# Patient Record
Sex: FEMALE | Race: WHITE | NOT HISPANIC OR LATINO | Employment: FULL TIME | URBAN - NONMETROPOLITAN AREA
[De-identification: names, ages, dates, MRNs, and addresses within clinical notes are randomized per-mention and may not be internally consistent; named-entity substitution may affect disease eponyms.]

---

## 2017-02-22 ENCOUNTER — OFFICE VISIT (OUTPATIENT)
Dept: OBSTETRICS AND GYNECOLOGY | Facility: CLINIC | Age: 57
End: 2017-02-22

## 2017-02-22 VITALS
WEIGHT: 141 LBS | SYSTOLIC BLOOD PRESSURE: 122 MMHG | BODY MASS INDEX: 22.66 KG/M2 | HEIGHT: 66 IN | DIASTOLIC BLOOD PRESSURE: 62 MMHG

## 2017-02-22 DIAGNOSIS — F17.200 SMOKER: ICD-10-CM

## 2017-02-22 DIAGNOSIS — N95.1 MENOPAUSAL SYMPTOMS: ICD-10-CM

## 2017-02-22 DIAGNOSIS — N87.9 DYSPLASIA OF CERVIX: Primary | ICD-10-CM

## 2017-02-22 PROCEDURE — 87624 HPV HI-RISK TYP POOLED RSLT: CPT | Performed by: NURSE PRACTITIONER

## 2017-02-22 PROCEDURE — 99213 OFFICE O/P EST LOW 20 MIN: CPT | Performed by: NURSE PRACTITIONER

## 2017-02-22 PROCEDURE — 87625 HPV TYPES 16 & 18 ONLY: CPT | Performed by: NURSE PRACTITIONER

## 2017-02-22 PROCEDURE — G0123 SCREEN CERV/VAG THIN LAYER: HCPCS | Performed by: NURSE PRACTITIONER

## 2017-02-22 NOTE — PROGRESS NOTES
Subjective   Elizabeth Carrillo is a 57 y.o. female.     HPI Comments: Here for Repeat pap. She feels she may have a yeast infection.      The following portions of the patient's history were reviewed and updated as appropriate: allergies, current medications, past family history, past medical history, past social history, past surgical history and problem list.    Review of Systems   Constitutional: Negative for activity change, appetite change, chills, diaphoresis, fatigue, fever and unexpected weight change.   HENT: Negative for congestion, ear discharge, ear pain, facial swelling, hearing loss, mouth sores, nosebleeds, postnasal drip, rhinorrhea, sinus pressure, sneezing, sore throat, tinnitus, trouble swallowing and voice change.    Eyes: Negative for photophobia, pain, discharge, redness, itching and visual disturbance.   Respiratory: Negative for apnea, cough, choking, chest tightness and shortness of breath.    Cardiovascular: Negative for chest pain, palpitations and leg swelling.   Gastrointestinal: Negative for abdominal distention, abdominal pain, anal bleeding, blood in stool, constipation, diarrhea, nausea, rectal pain and vomiting.   Endocrine: Negative for cold intolerance and heat intolerance.   Genitourinary: Negative for decreased urine volume, difficulty urinating, dyspareunia, flank pain, frequency, genital sores, hematuria, menstrual problem, pelvic pain, urgency, vaginal bleeding, vaginal discharge and vaginal pain.   Musculoskeletal: Negative for arthralgias, back pain, joint swelling and myalgias.   Skin: Negative for color change and rash.   Allergic/Immunologic: Negative for environmental allergies.   Neurological: Negative for dizziness, syncope, weakness, numbness and headaches.   Hematological: Negative for adenopathy.   Psychiatric/Behavioral: Negative for agitation, confusion and sleep disturbance. The patient is not nervous/anxious.        Objective   Physical Exam   Constitutional: She  is oriented to person, place, and time. She appears well-developed and well-nourished.   HENT:   Head: Normocephalic and atraumatic.   Abdominal: Soft. She exhibits no distension. There is no tenderness.   Genitourinary: Vagina normal and uterus normal.       There is no rash, tenderness, lesion or injury on the right labia. There is no rash, tenderness, lesion or injury on the left labia. Uterus is not enlarged and not tender. Cervix exhibits no motion tenderness, no discharge and no friability. Right adnexum displays no mass, no tenderness and no fullness. Left adnexum displays no mass, no tenderness and no fullness. No erythema, tenderness or bleeding in the vagina. No vaginal discharge found.   Genitourinary Comments: Vaginal dryness noted.   Neurological: She is alert and oriented to person, place, and time.   Skin: Skin is warm and dry.   Psychiatric: She has a normal mood and affect. Her behavior is normal. Judgment and thought content normal.   Nursing note and vitals reviewed.      Assessment/Plan   Elizabeth was seen today for abnormal pap smear.    Diagnoses and all orders for this visit:    Dysplasia of cervix  Comments:  Repeat pap is done today.    Menopausal symptoms  Comments:  Pt has been on OC's even though she is a smoker. I explained to her she cannot take them and the reasons why. Will try low dose Prempro.  Orders:  -     estrogen, conjugated,-medroxyprogesterone (PREMPRO) 0.45-1.5 MG per tablet; Take 1 tablet by mouth Daily for 30 days.    Smoker  Comments:  Discussed Smoking cessation.

## 2017-03-13 ENCOUNTER — TELEPHONE (OUTPATIENT)
Dept: OBSTETRICS AND GYNECOLOGY | Facility: CLINIC | Age: 57
End: 2017-03-13

## 2017-03-13 LAB
GEN CATEG CVX/VAG CYTO-IMP: ABNORMAL
LAB AP CASE REPORT: ABNORMAL
Lab: ABNORMAL
PATH INTERP SPEC-IMP: ABNORMAL
STAT OF ADQ CVX/VAG CYTO-IMP: ABNORMAL

## 2017-03-31 ENCOUNTER — OFFICE VISIT (OUTPATIENT)
Dept: OBSTETRICS AND GYNECOLOGY | Facility: CLINIC | Age: 57
End: 2017-03-31

## 2017-03-31 VITALS
BODY MASS INDEX: 22.82 KG/M2 | SYSTOLIC BLOOD PRESSURE: 110 MMHG | HEIGHT: 66 IN | WEIGHT: 142 LBS | DIASTOLIC BLOOD PRESSURE: 80 MMHG

## 2017-03-31 DIAGNOSIS — R87.610 ASCUS WITH POSITIVE HIGH RISK HPV CERVICAL: Primary | ICD-10-CM

## 2017-03-31 DIAGNOSIS — R87.810 ASCUS WITH POSITIVE HIGH RISK HPV CERVICAL: Primary | ICD-10-CM

## 2017-03-31 DIAGNOSIS — F17.200 SMOKER: ICD-10-CM

## 2017-03-31 PROCEDURE — 57455 BIOPSY OF CERVIX W/SCOPE: CPT | Performed by: OBSTETRICS & GYNECOLOGY

## 2017-03-31 PROCEDURE — 88305 TISSUE EXAM BY PATHOLOGIST: CPT | Performed by: OBSTETRICS & GYNECOLOGY

## 2017-03-31 NOTE — PROGRESS NOTES
Colposcopy    Date of procedure:  3/31/2017    Risks and benefits discussed? yes  All questions answered? yes  Consents given by the patient  Written consent obtained? yes    Local anesthesia used:  no    Pre-op indication: ASCUS with POSITIVE high risk HPV  Procedure documentation:    The cervix was initially viewed colposcopically through a green filter.  The cervix was next bathed in acetic acid.   The findings were as follows:      The transformation zone was able to be seen adequately.    No visible lesions    Ectocervical biopsies were taken from  12 o'clock hemostasis achieved with  silver nitrate    An ECC was not performed.      Colposcopic Impression: 1. Adequate colposcopy  2. Normal cervix  3. Colposcopic findings are consistent with PAP       Plan: Will base further treatment on pathology results  Post biopsy instructions given to patient.  Specimens labelled and sent to pathology.      This note was electronically signed.    Jose M Chatman MD  March 31, 2017

## 2017-04-04 LAB
CYTO UR: NORMAL
LAB AP CASE REPORT: NORMAL
LAB AP CLINICAL INFORMATION: NORMAL
Lab: NORMAL
PATH REPORT.FINAL DX SPEC: NORMAL
PATH REPORT.GROSS SPEC: NORMAL

## 2018-12-28 ENCOUNTER — HOSPITAL ENCOUNTER (EMERGENCY)
Facility: HOSPITAL | Age: 58
Discharge: HOME | End: 2018-12-28
Admitting: EMERGENCY MEDICINE

## 2018-12-28 ENCOUNTER — APPOINTMENT (OUTPATIENT)
Dept: GENERAL RADIOLOGY | Facility: HOSPITAL | Age: 58
End: 2018-12-28

## 2018-12-28 VITALS
SYSTOLIC BLOOD PRESSURE: 123 MMHG | RESPIRATION RATE: 16 BRPM | DIASTOLIC BLOOD PRESSURE: 80 MMHG | BODY MASS INDEX: 23.78 KG/M2 | HEIGHT: 66 IN | HEART RATE: 77 BPM | WEIGHT: 148 LBS | OXYGEN SATURATION: 99 % | TEMPERATURE: 98 F

## 2018-12-28 DIAGNOSIS — IMO0001 LUNG NODULE < 6CM ON CT: ICD-10-CM

## 2018-12-28 DIAGNOSIS — N39.0 ACUTE UTI: ICD-10-CM

## 2018-12-28 DIAGNOSIS — R10.31 RIGHT LOWER QUADRANT ABDOMINAL PAIN: Primary | ICD-10-CM

## 2018-12-28 LAB
ALBUMIN SERPL-MCNC: 4.7 G/DL (ref 3.5–5)
ALBUMIN/GLOB SERPL: 1.4 G/DL (ref 1.1–2.5)
ALP SERPL-CCNC: 96 U/L (ref 24–120)
ALT SERPL W P-5'-P-CCNC: 35 U/L (ref 0–54)
AMYLASE SERPL-CCNC: 94 U/L (ref 30–110)
ANION GAP SERPL CALCULATED.3IONS-SCNC: 10 MMOL/L (ref 4–13)
AST SERPL-CCNC: 40 U/L (ref 7–45)
BACTERIA UR QL AUTO: ABNORMAL /HPF
BASOPHILS # BLD AUTO: 0.04 10*3/MM3 (ref 0–0.2)
BASOPHILS NFR BLD AUTO: 0.3 % (ref 0–2)
BILIRUB SERPL-MCNC: 0.3 MG/DL (ref 0.1–1)
BILIRUB UR QL STRIP: ABNORMAL
BUN BLD-MCNC: 11 MG/DL (ref 5–21)
BUN/CREAT SERPL: 16.7 (ref 7–25)
CALCIUM SPEC-SCNC: 9.7 MG/DL (ref 8.4–10.4)
CHLORIDE SERPL-SCNC: 99 MMOL/L (ref 98–110)
CLARITY UR: CLEAR
CO2 SERPL-SCNC: 31 MMOL/L (ref 24–31)
COLOR UR: ABNORMAL
CREAT BLD-MCNC: 0.66 MG/DL (ref 0.5–1.4)
CRP SERPL-MCNC: 0.71 MG/DL (ref 0–0.99)
DEPRECATED RDW RBC AUTO: 45.8 FL (ref 40–54)
EOSINOPHIL # BLD AUTO: 0.14 10*3/MM3 (ref 0–0.7)
EOSINOPHIL NFR BLD AUTO: 1.2 % (ref 0–4)
ERYTHROCYTE [DISTWIDTH] IN BLOOD BY AUTOMATED COUNT: 13.5 % (ref 12–15)
ERYTHROCYTE [SEDIMENTATION RATE] IN BLOOD: 6 MM/HR (ref 0–20)
GFR SERPL CREATININE-BSD FRML MDRD: 92 ML/MIN/1.73
GLOBULIN UR ELPH-MCNC: 3.3 GM/DL
GLUCOSE BLD-MCNC: 95 MG/DL (ref 70–100)
GLUCOSE UR STRIP-MCNC: NEGATIVE MG/DL
HCT VFR BLD AUTO: 36.3 % (ref 37–47)
HGB BLD-MCNC: 12.1 G/DL (ref 12–16)
HGB UR QL STRIP.AUTO: ABNORMAL
HYALINE CASTS UR QL AUTO: ABNORMAL /LPF
IMM GRANULOCYTES # BLD AUTO: 0.05 10*3/MM3 (ref 0–0.03)
IMM GRANULOCYTES NFR BLD AUTO: 0.4 % (ref 0–5)
KETONES UR QL STRIP: ABNORMAL
LEUKOCYTE ESTERASE UR QL STRIP.AUTO: ABNORMAL
LIPASE SERPL-CCNC: 61 U/L (ref 23–203)
LYMPHOCYTES # BLD AUTO: 2.95 10*3/MM3 (ref 0.72–4.86)
LYMPHOCYTES NFR BLD AUTO: 25.7 % (ref 15–45)
MCH RBC QN AUTO: 30.6 PG (ref 28–32)
MCHC RBC AUTO-ENTMCNC: 33.3 G/DL (ref 33–36)
MCV RBC AUTO: 91.9 FL (ref 82–98)
MONOCYTES # BLD AUTO: 0.78 10*3/MM3 (ref 0.19–1.3)
MONOCYTES NFR BLD AUTO: 6.8 % (ref 4–12)
NEUTROPHILS # BLD AUTO: 7.51 10*3/MM3 (ref 1.87–8.4)
NEUTROPHILS NFR BLD AUTO: 65.6 % (ref 39–78)
NITRITE UR QL STRIP: NEGATIVE
NRBC BLD AUTO-RTO: 0 /100 WBC (ref 0–0)
PH UR STRIP.AUTO: 5.5 [PH] (ref 5–8)
PLATELET # BLD AUTO: 409 10*3/MM3 (ref 130–400)
PMV BLD AUTO: 9.2 FL (ref 6–12)
POTASSIUM BLD-SCNC: 3.7 MMOL/L (ref 3.5–5.3)
PROT SERPL-MCNC: 8 G/DL (ref 6.3–8.7)
PROT UR QL STRIP: NEGATIVE
RBC # BLD AUTO: 3.95 10*6/MM3 (ref 4.2–5.4)
RBC # UR: ABNORMAL /HPF
REF LAB TEST METHOD: ABNORMAL
SODIUM BLD-SCNC: 140 MMOL/L (ref 135–145)
SP GR UR STRIP: 1.03 (ref 1–1.03)
SQUAMOUS #/AREA URNS HPF: ABNORMAL /HPF
UROBILINOGEN UR QL STRIP: ABNORMAL
WBC NRBC COR # BLD: 11.47 10*3/MM3 (ref 4.8–10.8)
WBC UR QL AUTO: ABNORMAL /HPF

## 2018-12-28 PROCEDURE — 93005 ELECTROCARDIOGRAM TRACING: CPT | Performed by: PHYSICIAN ASSISTANT

## 2018-12-28 PROCEDURE — 85651 RBC SED RATE NONAUTOMATED: CPT | Performed by: PHYSICIAN ASSISTANT

## 2018-12-28 PROCEDURE — 71045 X-RAY EXAM CHEST 1 VIEW: CPT

## 2018-12-28 PROCEDURE — 85025 COMPLETE CBC W/AUTO DIFF WBC: CPT | Performed by: PHYSICIAN ASSISTANT

## 2018-12-28 PROCEDURE — 81001 URINALYSIS AUTO W/SCOPE: CPT | Performed by: PHYSICIAN ASSISTANT

## 2018-12-28 PROCEDURE — 99283 EMERGENCY DEPT VISIT LOW MDM: CPT

## 2018-12-28 PROCEDURE — 80053 COMPREHEN METABOLIC PANEL: CPT | Performed by: PHYSICIAN ASSISTANT

## 2018-12-28 PROCEDURE — 93010 ELECTROCARDIOGRAM REPORT: CPT | Performed by: INTERNAL MEDICINE

## 2018-12-28 PROCEDURE — 86140 C-REACTIVE PROTEIN: CPT | Performed by: PHYSICIAN ASSISTANT

## 2018-12-28 PROCEDURE — 82150 ASSAY OF AMYLASE: CPT | Performed by: PHYSICIAN ASSISTANT

## 2018-12-28 PROCEDURE — 83690 ASSAY OF LIPASE: CPT | Performed by: PHYSICIAN ASSISTANT

## 2018-12-28 RX ORDER — SODIUM CHLORIDE 9 MG/ML
125 INJECTION, SOLUTION INTRAVENOUS CONTINUOUS
Status: DISCONTINUED | OUTPATIENT
Start: 2018-12-28 | End: 2018-12-28 | Stop reason: HOSPADM

## 2018-12-28 RX ORDER — SODIUM CHLORIDE 0.9 % (FLUSH) 0.9 %
10 SYRINGE (ML) INJECTION AS NEEDED
Status: DISCONTINUED | OUTPATIENT
Start: 2018-12-28 | End: 2018-12-28 | Stop reason: HOSPADM

## 2019-11-13 ENCOUNTER — OFFICE VISIT (OUTPATIENT)
Dept: OBSTETRICS AND GYNECOLOGY | Facility: CLINIC | Age: 59
End: 2019-11-13

## 2019-11-13 VITALS
HEIGHT: 66 IN | DIASTOLIC BLOOD PRESSURE: 70 MMHG | WEIGHT: 130 LBS | SYSTOLIC BLOOD PRESSURE: 124 MMHG | BODY MASS INDEX: 20.89 KG/M2

## 2019-11-13 DIAGNOSIS — A63.0 VULVAR WARTS: Primary | ICD-10-CM

## 2019-11-13 DIAGNOSIS — R87.610 ATYPICAL SQUAMOUS CELLS OF UNDETERMINED SIGNIFICANCE ON CYTOLOGIC SMEAR OF CERVIX (ASC-US): ICD-10-CM

## 2019-11-13 DIAGNOSIS — F17.200 SMOKER: ICD-10-CM

## 2019-11-13 PROCEDURE — 99213 OFFICE O/P EST LOW 20 MIN: CPT | Performed by: OBSTETRICS & GYNECOLOGY

## 2019-11-13 PROCEDURE — 87624 HPV HI-RISK TYP POOLED RSLT: CPT | Performed by: OBSTETRICS & GYNECOLOGY

## 2019-11-13 PROCEDURE — G0123 SCREEN CERV/VAG THIN LAYER: HCPCS | Performed by: OBSTETRICS & GYNECOLOGY

## 2019-11-13 PROCEDURE — 56501 DESTROY VULVA LESIONS SIM: CPT | Performed by: OBSTETRICS & GYNECOLOGY

## 2019-11-13 RX ORDER — ALBUTEROL SULFATE 90 UG/1
AEROSOL, METERED RESPIRATORY (INHALATION)
Refills: 0 | Status: ON HOLD | COMMUNITY
Start: 2019-09-12 | End: 2020-02-20

## 2019-11-13 RX ORDER — LISINOPRIL 40 MG/1
TABLET ORAL
Refills: 3 | COMMUNITY
Start: 2019-09-23 | End: 2020-06-22

## 2019-11-13 NOTE — PROGRESS NOTES
Subjective   Elizabeth Carrillo is a 59 y.o. female is here today for follow-up.    Patient presents today with for treatment of genital warts.    History of Present Illness     On review of her records, she is also had a history of abnormal Pap smear that she never followed up on.  She is otherwise without complaints.  She does report being treated with TCA in the past.    The following portions of the patient's history were reviewed and updated as appropriate: allergies, current medications, past family history, past medical history, past social history, past surgical history and problem list.    Review of Systems   Genitourinary: Positive for genital sores. Negative for menstrual problem, pelvic pain and vaginal bleeding.   All other systems reviewed and are negative.      Objective   Physical Exam   Constitutional: She appears well-developed and well-nourished.   HENT:   Head: Normocephalic and atraumatic.   Abdominal: Soft. Bowel sounds are normal.   Genitourinary:       Pelvic exam was performed with patient supine.       Skin: Skin is warm and dry.   Psychiatric: She has a normal mood and affect. Her behavior is normal. Judgment and thought content normal.   Nursing note and vitals reviewed.        Assessment/Plan   Elizabeth was seen today for genital warts.    Diagnoses and all orders for this visit:    Vulvar warts    Atypical squamous cells of undetermined significance on cytologic smear of cervix (ASC-US)    Smoker      Return office in 2 weeks.  Call for concerns or questions.  Follow-up as indicated on repeat Pap smear.    Jose M Chatman MD

## 2019-11-14 ENCOUNTER — APPOINTMENT (OUTPATIENT)
Dept: GENERAL RADIOLOGY | Facility: HOSPITAL | Age: 59
End: 2019-11-14

## 2019-11-14 ENCOUNTER — APPOINTMENT (OUTPATIENT)
Dept: CT IMAGING | Facility: HOSPITAL | Age: 59
End: 2019-11-14

## 2019-11-14 ENCOUNTER — ANESTHESIA (OUTPATIENT)
Dept: PERIOP | Facility: HOSPITAL | Age: 59
End: 2019-11-14

## 2019-11-14 ENCOUNTER — ANESTHESIA EVENT (OUTPATIENT)
Dept: PERIOP | Facility: HOSPITAL | Age: 59
End: 2019-11-14

## 2019-11-14 ENCOUNTER — HOSPITAL ENCOUNTER (OUTPATIENT)
Facility: HOSPITAL | Age: 59
Discharge: HOME | End: 2019-11-15
Attending: SPECIALIST | Admitting: SPECIALIST

## 2019-11-14 DIAGNOSIS — K35.80 ACUTE APPENDICITIS, UNSPECIFIED ACUTE APPENDICITIS TYPE: Primary | ICD-10-CM

## 2019-11-14 LAB
ALBUMIN SERPL-MCNC: 4.7 G/DL (ref 3.5–5.2)
ALBUMIN/GLOB SERPL: 1.6 G/DL
ALP SERPL-CCNC: 90 U/L (ref 39–117)
ALT SERPL W P-5'-P-CCNC: 12 U/L (ref 1–33)
AMYLASE SERPL-CCNC: 105 U/L (ref 28–100)
ANION GAP SERPL CALCULATED.3IONS-SCNC: 13 MMOL/L (ref 5–15)
AST SERPL-CCNC: 16 U/L (ref 1–32)
BACTERIA UR QL AUTO: ABNORMAL /HPF
BASOPHILS # BLD AUTO: 0.05 10*3/MM3 (ref 0–0.2)
BASOPHILS NFR BLD AUTO: 0.2 % (ref 0–1.5)
BILIRUB SERPL-MCNC: 0.5 MG/DL (ref 0.2–1.2)
BILIRUB UR QL STRIP: NEGATIVE
BUN BLD-MCNC: 15 MG/DL (ref 8–23)
BUN/CREAT SERPL: 19.5 (ref 7–25)
CALCIUM SPEC-SCNC: 10.2 MG/DL (ref 8.6–10.5)
CHLORIDE SERPL-SCNC: 98 MMOL/L (ref 98–107)
CLARITY UR: CLEAR
CO2 SERPL-SCNC: 27 MMOL/L (ref 22–29)
COLOR UR: YELLOW
CREAT BLD-MCNC: 0.77 MG/DL (ref 0.57–1)
DEPRECATED RDW RBC AUTO: 46.6 FL (ref 37–54)
EOSINOPHIL # BLD AUTO: 0 10*3/MM3 (ref 0–0.4)
EOSINOPHIL NFR BLD AUTO: 0 % (ref 0.3–6.2)
ERYTHROCYTE [DISTWIDTH] IN BLOOD BY AUTOMATED COUNT: 13.6 % (ref 12.3–15.4)
GFR SERPL CREATININE-BSD FRML MDRD: 76 ML/MIN/1.73
GLOBULIN UR ELPH-MCNC: 2.9 GM/DL
GLUCOSE BLD-MCNC: 122 MG/DL (ref 65–99)
GLUCOSE UR STRIP-MCNC: NEGATIVE MG/DL
HCT VFR BLD AUTO: 40.8 % (ref 34–46.6)
HGB BLD-MCNC: 13.9 G/DL (ref 12–15.9)
HGB UR QL STRIP.AUTO: ABNORMAL
HOLD SPECIMEN: NORMAL
HOLD SPECIMEN: NORMAL
IMM GRANULOCYTES # BLD AUTO: 0.11 10*3/MM3 (ref 0–0.05)
IMM GRANULOCYTES NFR BLD AUTO: 0.4 % (ref 0–0.5)
KETONES UR QL STRIP: ABNORMAL
LEUKOCYTE ESTERASE UR QL STRIP.AUTO: NEGATIVE
LIPASE SERPL-CCNC: 26 U/L (ref 13–60)
LYMPHOCYTES # BLD AUTO: 1.33 10*3/MM3 (ref 0.7–3.1)
LYMPHOCYTES NFR BLD AUTO: 5.1 % (ref 19.6–45.3)
MCH RBC QN AUTO: 31.4 PG (ref 26.6–33)
MCHC RBC AUTO-ENTMCNC: 34.1 G/DL (ref 31.5–35.7)
MCV RBC AUTO: 92.3 FL (ref 79–97)
MONOCYTES # BLD AUTO: 0.88 10*3/MM3 (ref 0.1–0.9)
MONOCYTES NFR BLD AUTO: 3.4 % (ref 5–12)
NEUTROPHILS # BLD AUTO: 23.75 10*3/MM3 (ref 1.7–7)
NEUTROPHILS NFR BLD AUTO: 90.9 % (ref 42.7–76)
NITRITE UR QL STRIP: NEGATIVE
NRBC BLD AUTO-RTO: 0 /100 WBC (ref 0–0.2)
PH UR STRIP.AUTO: 5.5 [PH] (ref 5–8)
PLATELET # BLD AUTO: 341 10*3/MM3 (ref 140–450)
PMV BLD AUTO: 9.8 FL (ref 6–12)
POTASSIUM BLD-SCNC: 4.4 MMOL/L (ref 3.5–5.2)
PROT SERPL-MCNC: 7.6 G/DL (ref 6–8.5)
PROT UR QL STRIP: NEGATIVE
RBC # BLD AUTO: 4.42 10*6/MM3 (ref 3.77–5.28)
RBC # UR: ABNORMAL /HPF
REF LAB TEST METHOD: ABNORMAL
SODIUM BLD-SCNC: 138 MMOL/L (ref 136–145)
SP GR UR STRIP: >1.03 (ref 1–1.03)
SQUAMOUS #/AREA URNS HPF: ABNORMAL /HPF
UROBILINOGEN UR QL STRIP: ABNORMAL
WBC NRBC COR # BLD: 26.12 10*3/MM3 (ref 3.4–10.8)
WBC UR QL AUTO: ABNORMAL /HPF
WHOLE BLOOD HOLD SPECIMEN: NORMAL
WHOLE BLOOD HOLD SPECIMEN: NORMAL

## 2019-11-14 PROCEDURE — 93005 ELECTROCARDIOGRAM TRACING: CPT | Performed by: SPECIALIST

## 2019-11-14 PROCEDURE — 80053 COMPREHEN METABOLIC PANEL: CPT

## 2019-11-14 PROCEDURE — 25010000002 DEXAMETHASONE PER 1 MG: Performed by: NURSE ANESTHETIST, CERTIFIED REGISTERED

## 2019-11-14 PROCEDURE — 85025 COMPLETE CBC W/AUTO DIFF WBC: CPT

## 2019-11-14 PROCEDURE — G0378 HOSPITAL OBSERVATION PER HR: HCPCS

## 2019-11-14 PROCEDURE — 25010000002 IOPAMIDOL 61 % SOLUTION: Performed by: EMERGENCY MEDICINE

## 2019-11-14 PROCEDURE — 88304 TISSUE EXAM BY PATHOLOGIST: CPT | Performed by: SPECIALIST

## 2019-11-14 PROCEDURE — 83690 ASSAY OF LIPASE: CPT

## 2019-11-14 PROCEDURE — 96365 THER/PROPH/DIAG IV INF INIT: CPT

## 2019-11-14 PROCEDURE — 25010000002 PROPOFOL 10 MG/ML EMULSION: Performed by: NURSE ANESTHETIST, CERTIFIED REGISTERED

## 2019-11-14 PROCEDURE — 81001 URINALYSIS AUTO W/SCOPE: CPT | Performed by: SPECIALIST

## 2019-11-14 PROCEDURE — C1889 IMPLANT/INSERT DEVICE, NOC: HCPCS | Performed by: SPECIALIST

## 2019-11-14 PROCEDURE — 25010000002 ONDANSETRON PER 1 MG: Performed by: NURSE ANESTHETIST, CERTIFIED REGISTERED

## 2019-11-14 PROCEDURE — 0: Performed by: SPECIALIST

## 2019-11-14 PROCEDURE — 93010 ELECTROCARDIOGRAM REPORT: CPT | Performed by: INTERNAL MEDICINE

## 2019-11-14 PROCEDURE — 25010000002 FENTANYL CITRATE (PF) 100 MCG/2ML SOLUTION: Performed by: EMERGENCY MEDICINE

## 2019-11-14 PROCEDURE — 25010000002 MORPHINE PER 10 MG: Performed by: EMERGENCY MEDICINE

## 2019-11-14 PROCEDURE — 94760 N-INVAS EAR/PLS OXIMETRY 1: CPT

## 2019-11-14 PROCEDURE — 25010000002 ONDANSETRON PER 1 MG: Performed by: EMERGENCY MEDICINE

## 2019-11-14 PROCEDURE — 25010000002 NEOSTIGMINE 10 MG/10ML SOLUTION 10 ML VIAL: Performed by: NURSE ANESTHETIST, CERTIFIED REGISTERED

## 2019-11-14 PROCEDURE — 25010000002 ERTAPENEM PER 500 MG: Performed by: NURSE ANESTHETIST, CERTIFIED REGISTERED

## 2019-11-14 PROCEDURE — 25010000002 CEFOXITIN PER 1 G: Performed by: SPECIALIST

## 2019-11-14 PROCEDURE — 25010000002 ERTAPENEM PER 500 MG: Performed by: PHYSICIAN ASSISTANT

## 2019-11-14 PROCEDURE — 74177 CT ABD & PELVIS W/CONTRAST: CPT

## 2019-11-14 PROCEDURE — 71046 X-RAY EXAM CHEST 2 VIEWS: CPT

## 2019-11-14 PROCEDURE — 94799 UNLISTED PULMONARY SVC/PX: CPT

## 2019-11-14 PROCEDURE — 25010000002 FENTANYL CITRATE (PF) 100 MCG/2ML SOLUTION: Performed by: NURSE ANESTHETIST, CERTIFIED REGISTERED

## 2019-11-14 PROCEDURE — 82150 ASSAY OF AMYLASE: CPT

## 2019-11-14 PROCEDURE — 96375 TX/PRO/DX INJ NEW DRUG ADDON: CPT

## 2019-11-14 PROCEDURE — 25010000002 KETOROLAC TROMETHAMINE PER 15 MG: Performed by: SPECIALIST

## 2019-11-14 PROCEDURE — 99284 EMERGENCY DEPT VISIT MOD MDM: CPT

## 2019-11-14 DEVICE — ETS45 RELOAD STANDARD 45MM
Type: IMPLANTABLE DEVICE | Status: FUNCTIONAL
Brand: ENDOPATH

## 2019-11-14 DEVICE — ENDOPATH ETS45 2.5MM RELOADS (VASCULAR/THIN)
Type: IMPLANTABLE DEVICE | Status: FUNCTIONAL
Brand: ENDOPATH

## 2019-11-14 RX ORDER — ROCURONIUM BROMIDE 10 MG/ML
INJECTION, SOLUTION INTRAVENOUS AS NEEDED
Status: DISCONTINUED | OUTPATIENT
Start: 2019-11-14 | End: 2019-11-14 | Stop reason: SURG

## 2019-11-14 RX ORDER — FENTANYL CITRATE 50 UG/ML
75 INJECTION, SOLUTION INTRAMUSCULAR; INTRAVENOUS ONCE
Status: COMPLETED | OUTPATIENT
Start: 2019-11-14 | End: 2019-11-14

## 2019-11-14 RX ORDER — LIDOCAINE HYDROCHLORIDE 20 MG/ML
INJECTION, SOLUTION INFILTRATION; PERINEURAL AS NEEDED
Status: DISCONTINUED | OUTPATIENT
Start: 2019-11-14 | End: 2019-11-14 | Stop reason: SURG

## 2019-11-14 RX ORDER — OXYCODONE AND ACETAMINOPHEN 10; 325 MG/1; MG/1
1 TABLET ORAL ONCE AS NEEDED
Status: COMPLETED | OUTPATIENT
Start: 2019-11-14 | End: 2019-11-14

## 2019-11-14 RX ORDER — FLUMAZENIL 0.1 MG/ML
0.2 INJECTION INTRAVENOUS AS NEEDED
Status: DISCONTINUED | OUTPATIENT
Start: 2019-11-14 | End: 2019-11-14 | Stop reason: HOSPADM

## 2019-11-14 RX ORDER — HYDRALAZINE HYDROCHLORIDE 20 MG/ML
5 INJECTION INTRAMUSCULAR; INTRAVENOUS
Status: DISCONTINUED | OUTPATIENT
Start: 2019-11-14 | End: 2019-11-14 | Stop reason: HOSPADM

## 2019-11-14 RX ORDER — LIDOCAINE HYDROCHLORIDE 40 MG/ML
SOLUTION TOPICAL AS NEEDED
Status: DISCONTINUED | OUTPATIENT
Start: 2019-11-14 | End: 2019-11-14 | Stop reason: SURG

## 2019-11-14 RX ORDER — ALBUTEROL SULFATE 90 UG/1
2 AEROSOL, METERED RESPIRATORY (INHALATION) EVERY 4 HOURS PRN
Status: DISCONTINUED | OUTPATIENT
Start: 2019-11-14 | End: 2019-11-14 | Stop reason: SDUPTHER

## 2019-11-14 RX ORDER — GLYCOPYRROLATE 0.2 MG/ML
INJECTION INTRAMUSCULAR; INTRAVENOUS AS NEEDED
Status: DISCONTINUED | OUTPATIENT
Start: 2019-11-14 | End: 2019-11-14 | Stop reason: SURG

## 2019-11-14 RX ORDER — ONDANSETRON 2 MG/ML
4 INJECTION INTRAMUSCULAR; INTRAVENOUS AS NEEDED
Status: DISCONTINUED | OUTPATIENT
Start: 2019-11-14 | End: 2019-11-14 | Stop reason: HOSPADM

## 2019-11-14 RX ORDER — SODIUM CHLORIDE, SODIUM LACTATE, POTASSIUM CHLORIDE, CALCIUM CHLORIDE 600; 310; 30; 20 MG/100ML; MG/100ML; MG/100ML; MG/100ML
100 INJECTION, SOLUTION INTRAVENOUS CONTINUOUS
Status: DISCONTINUED | OUTPATIENT
Start: 2019-11-14 | End: 2019-11-15 | Stop reason: HOSPADM

## 2019-11-14 RX ORDER — PROPOFOL 10 MG/ML
VIAL (ML) INTRAVENOUS AS NEEDED
Status: DISCONTINUED | OUTPATIENT
Start: 2019-11-14 | End: 2019-11-14 | Stop reason: SURG

## 2019-11-14 RX ORDER — NALOXONE HCL 0.4 MG/ML
0.04 VIAL (ML) INJECTION AS NEEDED
Status: DISCONTINUED | OUTPATIENT
Start: 2019-11-14 | End: 2019-11-14 | Stop reason: HOSPADM

## 2019-11-14 RX ORDER — NEOSTIGMINE METHYLSULFATE 1 MG/ML
INJECTION, SOLUTION INTRAVENOUS AS NEEDED
Status: DISCONTINUED | OUTPATIENT
Start: 2019-11-14 | End: 2019-11-14 | Stop reason: SURG

## 2019-11-14 RX ORDER — MORPHINE SULFATE 2 MG/ML
2 INJECTION, SOLUTION INTRAMUSCULAR; INTRAVENOUS
Status: DISCONTINUED | OUTPATIENT
Start: 2019-11-14 | End: 2019-11-14 | Stop reason: HOSPADM

## 2019-11-14 RX ORDER — BUPIVACAINE HYDROCHLORIDE AND EPINEPHRINE 2.5; 5 MG/ML; UG/ML
INJECTION, SOLUTION INFILTRATION; PERINEURAL AS NEEDED
Status: DISCONTINUED | OUTPATIENT
Start: 2019-11-14 | End: 2019-11-14 | Stop reason: HOSPADM

## 2019-11-14 RX ORDER — ONDANSETRON 2 MG/ML
4 INJECTION INTRAMUSCULAR; INTRAVENOUS ONCE
Status: COMPLETED | OUTPATIENT
Start: 2019-11-14 | End: 2019-11-14

## 2019-11-14 RX ORDER — FENTANYL CITRATE 50 UG/ML
25 INJECTION, SOLUTION INTRAMUSCULAR; INTRAVENOUS AS NEEDED
Status: DISCONTINUED | OUTPATIENT
Start: 2019-11-14 | End: 2019-11-14 | Stop reason: HOSPADM

## 2019-11-14 RX ORDER — LABETALOL HYDROCHLORIDE 5 MG/ML
5 INJECTION, SOLUTION INTRAVENOUS
Status: DISCONTINUED | OUTPATIENT
Start: 2019-11-14 | End: 2019-11-14 | Stop reason: HOSPADM

## 2019-11-14 RX ORDER — METOCLOPRAMIDE HYDROCHLORIDE 5 MG/ML
5 INJECTION INTRAMUSCULAR; INTRAVENOUS
Status: DISCONTINUED | OUTPATIENT
Start: 2019-11-14 | End: 2019-11-14 | Stop reason: HOSPADM

## 2019-11-14 RX ORDER — OXYCODONE HYDROCHLORIDE AND ACETAMINOPHEN 5; 325 MG/1; MG/1
1 TABLET ORAL EVERY 4 HOURS PRN
Status: DISCONTINUED | OUTPATIENT
Start: 2019-11-14 | End: 2019-11-15 | Stop reason: HOSPADM

## 2019-11-14 RX ORDER — ALBUTEROL SULFATE 2.5 MG/3ML
2.5 SOLUTION RESPIRATORY (INHALATION) EVERY 4 HOURS PRN
Status: DISCONTINUED | OUTPATIENT
Start: 2019-11-14 | End: 2019-11-15 | Stop reason: HOSPADM

## 2019-11-14 RX ORDER — SODIUM CHLORIDE 9 MG/ML
INJECTION, SOLUTION INTRAVENOUS AS NEEDED
Status: DISCONTINUED | OUTPATIENT
Start: 2019-11-14 | End: 2019-11-14 | Stop reason: HOSPADM

## 2019-11-14 RX ORDER — SODIUM CHLORIDE, SODIUM LACTATE, POTASSIUM CHLORIDE, CALCIUM CHLORIDE 600; 310; 30; 20 MG/100ML; MG/100ML; MG/100ML; MG/100ML
INJECTION, SOLUTION INTRAVENOUS CONTINUOUS PRN
Status: DISCONTINUED | OUTPATIENT
Start: 2019-11-14 | End: 2019-11-14 | Stop reason: SURG

## 2019-11-14 RX ORDER — IPRATROPIUM BROMIDE AND ALBUTEROL SULFATE 2.5; .5 MG/3ML; MG/3ML
3 SOLUTION RESPIRATORY (INHALATION) ONCE AS NEEDED
Status: DISCONTINUED | OUTPATIENT
Start: 2019-11-14 | End: 2019-11-14 | Stop reason: HOSPADM

## 2019-11-14 RX ORDER — SODIUM CHLORIDE 0.9 % (FLUSH) 0.9 %
10 SYRINGE (ML) INJECTION AS NEEDED
Status: DISCONTINUED | OUTPATIENT
Start: 2019-11-14 | End: 2019-11-14

## 2019-11-14 RX ORDER — ONDANSETRON 2 MG/ML
INJECTION INTRAMUSCULAR; INTRAVENOUS AS NEEDED
Status: DISCONTINUED | OUTPATIENT
Start: 2019-11-14 | End: 2019-11-14 | Stop reason: SURG

## 2019-11-14 RX ORDER — DEXAMETHASONE SODIUM PHOSPHATE 4 MG/ML
INJECTION, SOLUTION INTRA-ARTICULAR; INTRALESIONAL; INTRAMUSCULAR; INTRAVENOUS; SOFT TISSUE AS NEEDED
Status: DISCONTINUED | OUTPATIENT
Start: 2019-11-14 | End: 2019-11-14 | Stop reason: SURG

## 2019-11-14 RX ORDER — FENTANYL CITRATE 50 UG/ML
INJECTION, SOLUTION INTRAMUSCULAR; INTRAVENOUS AS NEEDED
Status: DISCONTINUED | OUTPATIENT
Start: 2019-11-14 | End: 2019-11-14 | Stop reason: SURG

## 2019-11-14 RX ORDER — MAGNESIUM HYDROXIDE 1200 MG/15ML
LIQUID ORAL AS NEEDED
Status: DISCONTINUED | OUTPATIENT
Start: 2019-11-14 | End: 2019-11-14 | Stop reason: HOSPADM

## 2019-11-14 RX ORDER — KETOROLAC TROMETHAMINE 30 MG/ML
30 INJECTION, SOLUTION INTRAMUSCULAR; INTRAVENOUS EVERY 6 HOURS
Status: DISCONTINUED | OUTPATIENT
Start: 2019-11-14 | End: 2019-11-15 | Stop reason: HOSPADM

## 2019-11-14 RX ADMIN — GLYCOPYRROLATE 0.4 MG: 0.2 INJECTION, SOLUTION INTRAMUSCULAR; INTRAVENOUS at 20:44

## 2019-11-14 RX ADMIN — SODIUM CHLORIDE, POTASSIUM CHLORIDE, SODIUM LACTATE AND CALCIUM CHLORIDE 100 ML/HR: 600; 310; 30; 20 INJECTION, SOLUTION INTRAVENOUS at 22:26

## 2019-11-14 RX ADMIN — LIDOCAINE HYDROCHLORIDE 50 MG: 20 INJECTION, SOLUTION INFILTRATION; PERINEURAL at 20:20

## 2019-11-14 RX ADMIN — CEFOXITIN SODIUM 2 G: 2 POWDER, FOR SOLUTION INTRAVENOUS at 23:43

## 2019-11-14 RX ADMIN — LIDOCAINE HYDROCHLORIDE 1 EACH: 40 SOLUTION TOPICAL at 20:20

## 2019-11-14 RX ADMIN — OXYCODONE HYDROCHLORIDE AND ACETAMINOPHEN 1 TABLET: 10; 325 TABLET ORAL at 21:51

## 2019-11-14 RX ADMIN — MORPHINE SULFATE 4 MG: 4 INJECTION, SOLUTION INTRAMUSCULAR; INTRAVENOUS at 16:30

## 2019-11-14 RX ADMIN — IOPAMIDOL 100 ML: 612 INJECTION, SOLUTION INTRAVENOUS at 18:03

## 2019-11-14 RX ADMIN — NEOSTIGMINE METHYLSULFATE 3 MG: 1 INJECTION INTRAVENOUS at 20:44

## 2019-11-14 RX ADMIN — ERTAPENEM SODIUM 1 G: 1 INJECTION, POWDER, LYOPHILIZED, FOR SOLUTION INTRAMUSCULAR; INTRAVENOUS at 19:22

## 2019-11-14 RX ADMIN — SODIUM CHLORIDE, POTASSIUM CHLORIDE, SODIUM LACTATE AND CALCIUM CHLORIDE: 600; 310; 30; 20 INJECTION, SOLUTION INTRAVENOUS at 20:18

## 2019-11-14 RX ADMIN — FENTANYL CITRATE 75 MCG: 50 INJECTION, SOLUTION INTRAMUSCULAR; INTRAVENOUS at 19:47

## 2019-11-14 RX ADMIN — KETOROLAC TROMETHAMINE 30 MG: 30 INJECTION, SOLUTION INTRAMUSCULAR at 23:43

## 2019-11-14 RX ADMIN — ONDANSETRON HYDROCHLORIDE 4 MG: 2 SOLUTION INTRAMUSCULAR; INTRAVENOUS at 16:30

## 2019-11-14 RX ADMIN — PROPOFOL 150 MG: 10 INJECTION, EMULSION INTRAVENOUS at 20:20

## 2019-11-14 RX ADMIN — HYDROMORPHONE HYDROCHLORIDE 1 MG: 1 INJECTION, SOLUTION INTRAMUSCULAR; INTRAVENOUS; SUBCUTANEOUS at 17:27

## 2019-11-14 RX ADMIN — ROCURONIUM BROMIDE 30 MG: 10 INJECTION INTRAVENOUS at 20:20

## 2019-11-14 RX ADMIN — FENTANYL CITRATE 100 MCG: 50 INJECTION, SOLUTION INTRAMUSCULAR; INTRAVENOUS at 20:20

## 2019-11-14 RX ADMIN — SODIUM CHLORIDE 1 G: 900 INJECTION INTRAVENOUS at 19:22

## 2019-11-14 RX ADMIN — SODIUM CHLORIDE 1000 ML: 9 INJECTION, SOLUTION INTRAVENOUS at 16:23

## 2019-11-14 RX ADMIN — DEXAMETHASONE SODIUM PHOSPHATE 4 MG: 4 INJECTION, SOLUTION INTRAMUSCULAR; INTRAVENOUS at 20:45

## 2019-11-14 RX ADMIN — ONDANSETRON HYDROCHLORIDE 4 MG: 2 SOLUTION INTRAMUSCULAR; INTRAVENOUS at 20:45

## 2019-11-14 NOTE — ED NOTES
Pt reports pain 10/10 but now in epigastric area.  Provider aware.      Avis Reyes, RN  11/14/19 3364

## 2019-11-15 VITALS
BODY MASS INDEX: 20.89 KG/M2 | OXYGEN SATURATION: 98 % | TEMPERATURE: 97.2 F | HEART RATE: 74 BPM | HEIGHT: 66 IN | DIASTOLIC BLOOD PRESSURE: 61 MMHG | SYSTOLIC BLOOD PRESSURE: 90 MMHG | RESPIRATION RATE: 16 BRPM | WEIGHT: 130 LBS

## 2019-11-15 LAB
ANION GAP SERPL CALCULATED.3IONS-SCNC: 8 MMOL/L (ref 5–15)
BUN BLD-MCNC: 13 MG/DL (ref 8–23)
BUN/CREAT SERPL: 17.6 (ref 7–25)
CALCIUM SPEC-SCNC: 9.4 MG/DL (ref 8.6–10.5)
CHLORIDE SERPL-SCNC: 99 MMOL/L (ref 98–107)
CO2 SERPL-SCNC: 29 MMOL/L (ref 22–29)
CREAT BLD-MCNC: 0.74 MG/DL (ref 0.57–1)
DEPRECATED RDW RBC AUTO: 48.5 FL (ref 37–54)
ERYTHROCYTE [DISTWIDTH] IN BLOOD BY AUTOMATED COUNT: 13.7 % (ref 12.3–15.4)
GEN CATEG CVX/VAG CYTO-IMP: NORMAL
GFR SERPL CREATININE-BSD FRML MDRD: 80 ML/MIN/1.73
GLUCOSE BLD-MCNC: 148 MG/DL (ref 65–99)
HCT VFR BLD AUTO: 34.3 % (ref 34–46.6)
HGB BLD-MCNC: 11.5 G/DL (ref 12–15.9)
HPV I/H RISK 4 DNA CVX QL PROBE+SIG AMP: NOT DETECTED
LAB AP CASE REPORT: NORMAL
LAB AP GYN ADDITIONAL INFORMATION: NORMAL
LAB AP GYN OTHER FINDINGS: NORMAL
MCH RBC QN AUTO: 31.9 PG (ref 26.6–33)
MCHC RBC AUTO-ENTMCNC: 33.5 G/DL (ref 31.5–35.7)
MCV RBC AUTO: 95.3 FL (ref 79–97)
PATH INTERP SPEC-IMP: NORMAL
PLATELET # BLD AUTO: 239 10*3/MM3 (ref 140–450)
PMV BLD AUTO: 10.5 FL (ref 6–12)
POTASSIUM BLD-SCNC: 4.9 MMOL/L (ref 3.5–5.2)
RBC # BLD AUTO: 3.6 10*6/MM3 (ref 3.77–5.28)
SODIUM BLD-SCNC: 136 MMOL/L (ref 136–145)
STAT OF ADQ CVX/VAG CYTO-IMP: NORMAL
WBC NRBC COR # BLD: 12.4 10*3/MM3 (ref 3.4–10.8)

## 2019-11-15 PROCEDURE — 25010000002 CEFOXITIN PER 1 G: Performed by: SPECIALIST

## 2019-11-15 PROCEDURE — 85027 COMPLETE CBC AUTOMATED: CPT | Performed by: SPECIALIST

## 2019-11-15 PROCEDURE — 94799 UNLISTED PULMONARY SVC/PX: CPT

## 2019-11-15 PROCEDURE — G0378 HOSPITAL OBSERVATION PER HR: HCPCS

## 2019-11-15 PROCEDURE — 94760 N-INVAS EAR/PLS OXIMETRY 1: CPT

## 2019-11-15 PROCEDURE — 80048 BASIC METABOLIC PNL TOTAL CA: CPT | Performed by: SPECIALIST

## 2019-11-15 PROCEDURE — 25010000002 KETOROLAC TROMETHAMINE PER 15 MG: Performed by: SPECIALIST

## 2019-11-15 RX ORDER — ERTAPENEM 1 G/1
INJECTION, POWDER, LYOPHILIZED, FOR SOLUTION INTRAMUSCULAR; INTRAVENOUS AS NEEDED
Status: DISCONTINUED | OUTPATIENT
Start: 2019-11-14 | End: 2019-11-15 | Stop reason: SURG

## 2019-11-15 RX ORDER — OXYCODONE HYDROCHLORIDE AND ACETAMINOPHEN 5; 325 MG/1; MG/1
1 TABLET ORAL EVERY 4 HOURS PRN
Qty: 30 TABLET | Refills: 0 | Status: SHIPPED | OUTPATIENT
Start: 2019-11-15 | End: 2019-11-24

## 2019-11-15 RX ADMIN — SODIUM CHLORIDE, POTASSIUM CHLORIDE, SODIUM LACTATE AND CALCIUM CHLORIDE 100 ML/HR: 600; 310; 30; 20 INJECTION, SOLUTION INTRAVENOUS at 10:07

## 2019-11-15 RX ADMIN — CEFOXITIN SODIUM 2 G: 2 POWDER, FOR SOLUTION INTRAVENOUS at 05:20

## 2019-11-15 RX ADMIN — OXYCODONE HYDROCHLORIDE AND ACETAMINOPHEN 1 TABLET: 5; 325 TABLET ORAL at 05:57

## 2019-11-15 RX ADMIN — KETOROLAC TROMETHAMINE 30 MG: 30 INJECTION, SOLUTION INTRAMUSCULAR at 05:20

## 2019-11-15 RX ADMIN — OXYCODONE HYDROCHLORIDE AND ACETAMINOPHEN 1 TABLET: 5; 325 TABLET ORAL at 12:25

## 2019-11-15 RX ADMIN — KETOROLAC TROMETHAMINE 30 MG: 30 INJECTION, SOLUTION INTRAMUSCULAR at 10:07

## 2019-11-15 RX ADMIN — CEFOXITIN SODIUM 2 G: 2 POWDER, FOR SOLUTION INTRAVENOUS at 10:07

## 2019-11-15 NOTE — PLAN OF CARE
"Problem: Patient Care Overview  Goal: Plan of Care Review  Outcome: Ongoing (interventions implemented as appropriate)   11/15/19 0357 11/15/19 1332   Coping/Psychosocial   Plan of Care Reviewed With patient --    Plan of Care Review   Progress no change --    OTHER   Outcome Summary --  Pt with minimal c/o pain this shift--prn meds given per orders. Pt ambulating frequently, ivf infusing, abx given per orders, vitals stable. will cont to monitor.     Goal: Individualization and Mutuality  Outcome: Ongoing (interventions implemented as appropriate)   11/15/19 0357   Individualization   Patient Specific Preferences has neck pillow from home   Patient Specific Goals (Include Timeframe) Decrease in WBC, pain control from surgery, and to be discharged   Patient Specific Interventions PRN pain/nausea meds, IVF & abx, monitor VS, labs   Mutuality/Individual Preferences   What Anxieties, Fears, Concerns, or Questions Do You Have About Your Care? \"How long do you think I will be here?\"   What Information Would Help Us Give You More Personalized Care? Unknown at this time   How Would You and/or Your Support Person Like to Participate in Your Care? Education and keep informed of treatment plan   Mutuality/Individual Preferences   How to Address Anxieties/Fears Education and keep informed of treatment plan     Goal: Discharge Needs Assessment  Outcome: Ongoing (interventions implemented as appropriate)   11/14/19 2233 11/14/19 2236 11/15/19 0357   Discharge Needs Assessment   Readmission Within the Last 30 Days --  --  no previous admission in last 30 days   Concerns to be Addressed --  --  no discharge needs identified   Patient/Family Anticipates Transition to --  --  home   Patient/Family Anticipated Services at Transition --  --  none   Transportation Concerns --  --  car, none   Transportation Anticipated --  family or friend will provide --    Anticipated Changes Related to Illness --  --  none   Equipment Needed After " Discharge --  --  none   Disability   Equipment Currently Used at Home none --  --      Goal: Interprofessional Rounds/Family Conf  Outcome: Ongoing (interventions implemented as appropriate)   11/15/19 1332   Interdisciplinary Rounds/Family Conf   Participants nursing;patient;physician       Problem: Appendectomy (Adult)  Goal: Signs and Symptoms of Listed Potential Problems Will be Absent, Minimized or Managed (Appendectomy)  Outcome: Ongoing (interventions implemented as appropriate)   11/15/19 3227   Goal/Outcome Evaluation   Problems Assessed (Appendectomy) all   Problems Present (Appendectomy) pain     Goal: Anesthesia/Sedation Recovery  Outcome: Ongoing (interventions implemented as appropriate)   11/14/19 9917   Goal/Outcome Evaluation   Anesthesia/Sedation Recovery criteria met for discharge

## 2019-11-15 NOTE — DISCHARGE SUMMARY
Consults     No orders found for last 30 day(s).      Angeles Gallagher MD [facility] Discharge Summary    Date of Discharge:  11/15/2019    Discharge Diagnosis: acute appendicitis    Presenting Problem/History of Present Illness  Acute appendicitis, unspecified acute appendicitis type [K35.80]     Hospital Course  Patient is a 59 y.o. female presented with abdominal pain.  Imaging demonstrated acute appendicitis. .she underwent lap appy.      Procedures Performed  Procedure(s):  APPENDECTOMY LAPAROSCOPIC POSSIBLE OPEN       Consults:   Consults     No orders found for last 30 day(s).          Condition on Discharge:  good    Vital Signs  Temp:  [97 °F (36.1 °C)-97.9 °F (36.6 °C)] 97.2 °F (36.2 °C)  Heart Rate:  [56-91] 74  Resp:  [8-20] 16  BP: ()/(52-80) 90/61    Physical Exam:   See History and Physical found in chart.    Discharge Disposition  Home or Self Care    Discharge Medications     Discharge Medications      New Medications      Instructions Start Date   oxyCODONE-acetaminophen 5-325 MG per tablet  Commonly known as:  PERCOCET   1 tablet, Oral, Every 4 Hours PRN         Continue These Medications      Instructions Start Date   albuterol sulfate  (90 Base) MCG/ACT inhaler  Commonly known as:  PROVENTIL HFA;VENTOLIN HFA;PROAIR HFA   No dose, route, or frequency recorded.      pravastatin 10 MG tablet  Commonly known as:  PRAVACHOL   10 mg, Oral, Daily      TRAZODONE HCL PO   Oral, Daily      ZESTRIL 5 MG tablet  Generic drug:  lisinopril   5 mg, Oral, Daily      lisinopril 40 MG tablet  Commonly known as:  PRINIVIL,ZESTRIL   No dose, route, or frequency recorded.      ZOLPIDEM TARTRATE PO   Oral, 10mg tablet              Discharge Diet:     Activity at Discharge:     Follow-up Appointments  Future Appointments   Date Time Provider Department Center   11/27/2019 11:00 AM Jose M Chatman MD [facility] None         Test Results Pending at Discharge   Order Current Status    Tissue Pathology Exam In  process           Angeles Gallagher MD  11/15/19  2:21 PM

## 2019-11-15 NOTE — ANESTHESIA POSTPROCEDURE EVALUATION
"Patient: Elizabeth ALMENDAREZ Stone    Procedure Summary     Date:  11/14/19 Room / Location:   [facility] OR  /  [facility] OR    Anesthesia Start:  2018 Anesthesia Stop:  2053    Procedure:  APPENDECTOMY LAPAROSCOPIC POSSIBLE OPEN (N/A Abdomen) Diagnosis:       Acute appendicitis, unspecified acute appendicitis type      (Acute appendicitis, unspecified acute appendicitis type [K35.80])    Surgeon:  Angeles Gallagher MD Provider:  Randall Aly CRNA    Anesthesia Type:  general ASA Status:  2          Anesthesia Type: general  Last vitals  BP   112/80 (11/14/19 1938)   Temp   97.5 °F (36.4 °C) (11/14/19 1940)   Pulse   75 (11/14/19 1941)   Resp   16 (11/14/19 1828)     SpO2   100 % (11/14/19 1941)     Post Anesthesia Care and Evaluation    Patient location during evaluation: PACU  Patient participation: complete - patient participated  Level of consciousness: awake and alert  Pain management: adequate  Airway patency: patent  Anesthetic complications: No anesthetic complications    Cardiovascular status: acceptable  Respiratory status: acceptable  Hydration status: acceptable    Comments: Blood pressure 112/80, pulse 75, temperature 97.5 °F (36.4 °C), temperature source Oral, resp. rate 16, height 167.6 cm (66\"), weight 59 kg (130 lb), SpO2 100 %, not currently breastfeeding.    Pt discharged from PACU based on raffi score >8      "

## 2019-11-15 NOTE — ANESTHESIA PROCEDURE NOTES
Airway  Urgency: elective    Date/Time: 11/14/2019 8:21 PM  Airway not difficult    General Information and Staff    Patient location during procedure: OR  CRNA: Randall Aly CRNA    Indications and Patient Condition  Indications for airway management: airway protection    Preoxygenated: yes  MILS maintained throughout  Mask difficulty assessment: 1 - vent by mask    Final Airway Details  Final airway type: endotracheal airway      Successful airway: ETT  Cuffed: yes   Successful intubation technique: direct laryngoscopy  Endotracheal tube insertion site: oral  Blade: Kumari  Blade size: 4  ETT size (mm): 7.5  Cormack-Lehane Classification: grade I - full view of glottis  Placement verified by: chest auscultation and capnometry   Cuff volume (mL): 5  Measured from: lips  ETT/EBT  to lips (cm): 22  Number of attempts at approach: 1  Assessment: lips, teeth, and gum same as pre-op and atraumatic intubation

## 2019-11-15 NOTE — ANESTHESIA PREPROCEDURE EVALUATION
Anesthesia Evaluation     Patient summary reviewed and Nursing notes reviewed   NPO Solid Status: > 8 hours  NPO Liquid Status: > 8 hours           Airway   Mallampati: II  TM distance: >3 FB  Neck ROM: full  No difficulty expected  Dental - normal exam         Pulmonary - normal exam   (+) pneumonia resolved , a smoker Current,   Cardiovascular   Exercise tolerance: excellent (>7 METS)    Rhythm: regular  Rate: normal    (+) hypertension well controlled,       Neuro/Psych- negative ROS  GI/Hepatic/Renal/Endo - negative ROS     Musculoskeletal (-) negative ROS    Abdominal    Substance History - negative use     OB/GYN negative ob/gyn ROS         Other - negative ROS                       Anesthesia Plan    ASA 2     general     intravenous induction     Anesthetic plan, all risks, benefits, and alternatives have been provided, discussed and informed consent has been obtained with: patient.

## 2019-11-15 NOTE — PROGRESS NOTES
Angeles Gallagher MD FACS  Progress Note     LOS: 0 days   Patient Care Team:  Jason Cuello MD as PCP - General (Internal Medicine)      Subjective     Interval History:      esteban Ruiz's     Objective     Vital Signs  Temp:  [97 °F (36.1 °C)-97.9 °F (36.6 °C)] 97.2 °F (36.2 °C)  Heart Rate:  [56-91] 74  Resp:  [8-20] 16  BP: ()/(52-80) 90/61    Physical Exam:  General appearance - alert, well appearing, and in no distress  Abdomen - soft, appropriatley tender, clean      Results Review:    Lab Results (last 24 hours)     Procedure Component Value Units Date/Time    Tissue Pathology Exam [[id number]] Collected:  11/14/19 2039    Specimen:  Tissue from Large Intestine, Appendix Updated:  11/15/19 0833    CBC (No Diff) [[id number]]  (Abnormal) Collected:  11/15/19 0530    Specimen:  Blood Updated:  11/15/19 0729     WBC 12.40 10*3/mm3      RBC 3.60 10*6/mm3      Hemoglobin 11.5 g/dL      Hematocrit 34.3 %      MCV 95.3 fL      MCH 31.9 pg      MCHC 33.5 g/dL      RDW 13.7 %      RDW-SD 48.5 fl      MPV 10.5 fL      Platelets 239 10*3/mm3     Basic Metabolic Panel [[id number]]  (Abnormal) Collected:  11/15/19 0530    Specimen:  Blood Updated:  11/15/19 0634     Glucose 148 mg/dL      BUN 13 mg/dL      Creatinine 0.74 mg/dL      Sodium 136 mmol/L      Potassium 4.9 mmol/L      Chloride 99 mmol/L      CO2 29.0 mmol/L      Calcium 9.4 mg/dL      eGFR Non African Amer 80 mL/min/1.73      BUN/Creatinine Ratio 17.6     Anion Gap 8.0 mmol/L     Narrative:       GFR Normal >60  Chronic Kidney Disease <60  Kidney Failure <15    Urinalysis, Microscopic Only - Urine, Clean Catch [[id number]]  (Abnormal) Collected:  11/14/19 1933    Specimen:  Urine, Clean Catch Updated:  11/14/19 2023     RBC, UA 0-2 /HPF      WBC, UA 3-5 /HPF      Bacteria, UA None Seen /HPF      Squamous Epithelial Cells, UA 7-12 /HPF      Methodology Manual Light Microscopy    Urinalysis With Culture If Indicated - Urine, Clean Catch [[id number]]   (Abnormal) Collected:  11/14/19 1933    Specimen:  Urine, Clean Catch Updated:  11/14/19 1957     Color, UA Yellow     Appearance, UA Clear     pH, UA 5.5     Specific Gravity, UA >1.030     Glucose, UA Negative     Ketones, UA Trace     Bilirubin, UA Negative     Blood, UA Small (1+)     Protein, UA Negative     Leuk Esterase, UA Negative     Nitrite, UA Negative     Urobilinogen, UA 0.2 E.U./dL    [place] Draw [[id number]] Collected:  11/14/19 1622    Specimen:  Blood Updated:  11/14/19 1731    Narrative:       The following orders were created for panel order [place] Draw.  Procedure                               Abnormality         Status                     ---------                               -----------         ------                     Light Blue Top[[id number]]                                   Final result               Green Top (Gel)[[id number]]                                  Final result               Lavender Top[[id number]]                                     Final result               Red Top[[id number]]                                          Final result                 Please view results for these tests on the individual orders.    Light Blue Top [[id number]] Collected:  11/14/19 1622    Specimen:  Blood Updated:  11/14/19 1731     Extra Tube hold for add-on     Comment: Auto resulted       Green Top (Gel) [[id number]] Collected:  11/14/19 1622    Specimen:  Blood Updated:  11/14/19 1731     Extra Tube Hold for add-ons.     Comment: Auto resulted.       Lavender Top [[id number]] Collected:  11/14/19 1622    Specimen:  Blood Updated:  11/14/19 1731     Extra Tube hold for add-on     Comment: Auto resulted       Red Top [[id number]] Collected:  11/14/19 1622    Specimen:  Blood Updated:  11/14/19 1731     Extra Tube Hold for add-ons.     Comment: Auto resulted.       Comprehensive Metabolic Panel [[id number]]  (Abnormal) Collected:  11/14/19 1622    Specimen:  Blood Updated:  11/14/19 1704     Glucose  122 mg/dL      BUN 15 mg/dL      Creatinine 0.77 mg/dL      Sodium 138 mmol/L      Potassium 4.4 mmol/L      Chloride 98 mmol/L      CO2 27.0 mmol/L      Calcium 10.2 mg/dL      Total Protein 7.6 g/dL      Albumin 4.70 g/dL      ALT (SGPT) 12 U/L      AST (SGOT) 16 U/L      Alkaline Phosphatase 90 U/L      Total Bilirubin 0.5 mg/dL      eGFR Non African Amer 76 mL/min/1.73      Globulin 2.9 gm/dL      A/G Ratio 1.6 g/dL      BUN/Creatinine Ratio 19.5     Anion Gap 13.0 mmol/L     Narrative:       GFR Normal >60  Chronic Kidney Disease <60  Kidney Failure <15    Lipase [[id number]]  (Normal) Collected:  11/14/19 1622    Specimen:  Blood Updated:  11/14/19 1704     Lipase 26 U/L     Amylase [[id number]]  (Abnormal) Collected:  11/14/19 1622    Specimen:  Blood Updated:  11/14/19 1704     Amylase 105 U/L     CBC & Differential [[id number]] Collected:  11/14/19 1622    Specimen:  Blood Updated:  11/14/19 1642    Narrative:       The following orders were created for panel order CBC & Differential.  Procedure                               Abnormality         Status                     ---------                               -----------         ------                     CBC Auto Differential[[id number]]        Abnormal            Final result                 Please view results for these tests on the individual orders.    CBC Auto Differential [[id number]]  (Abnormal) Collected:  11/14/19 1622    Specimen:  Blood Updated:  11/14/19 1642     WBC 26.12 10*3/mm3      RBC 4.42 10*6/mm3      Hemoglobin 13.9 g/dL      Hematocrit 40.8 %      MCV 92.3 fL      MCH 31.4 pg      MCHC 34.1 g/dL      RDW 13.6 %      RDW-SD 46.6 fl      MPV 9.8 fL      Platelets 341 10*3/mm3      Neutrophil % 90.9 %      Lymphocyte % 5.1 %      Monocyte % 3.4 %      Eosinophil % 0.0 %      Basophil % 0.2 %      Immature Grans % 0.4 %      Neutrophils, Absolute 23.75 10*3/mm3      Lymphocytes, Absolute 1.33 10*3/mm3      Monocytes, Absolute 0.88 10*3/mm3       Eosinophils, Absolute 0.00 10*3/mm3      Basophils, Absolute 0.05 10*3/mm3      Immature Grans, Absolute 0.11 10*3/mm3      nRBC 0.0 /100 WBC         Imaging Results (Last 24 Hours)     Procedure Component Value Units Date/Time    XR Chest 2 View [[id number]] Collected:  11/14/19 1946     Updated:  11/14/19 1950    Narrative:       XR CHEST 2 VW- 11/14/2019 7:25 PM CST     HISTORY: cough; K35.80-Unspecified acute appendicitis       COMPARISON: Chest exam dated 12/28/2018.     FINDINGS:   Left lung calcified granuloma. No consolidation. No pleural effusion or  pneumothorax. The cardiomediastinal silhouette and pulmonary vascularity  are within normal limits.      The osseous structures and surrounding soft tissues demonstrate no acute  abnormality.       Impression:       1. Stable chest exam without acute process. No visualized infiltrate.        This report was finalized on 11/14/2019 19:47 by Dr Cheikh Valle, .    CT Abdomen Pelvis With Contrast [[id number]] Collected:  11/14/19 1839     Updated:  11/14/19 1849    Narrative:       CT ABDOMEN PELVIS W CONTRAST- 11/14/2019 6:02 PM CST     HISTORY: rlq pain       COMPARISON: None.      DOSE LENGTH PRODUCT: 250 mGy cm. Automated exposure control was also  utilized to decrease patient radiation dose.     TECHNIQUE: Following the intravenous administration of contrast, helical  CT tomographic images of the abdomen and pelvis were acquired.  Multiplanar reformatted images were provided for review.      FINDINGS:   LOWER CHEST: The lung bases and included mediastinal structures are  unremarkable.      LIVER: No suspicious liver lesion. The portal veins are patent..      BILIARY SYSTEM: Partial gallbladder.      PANCREAS: No focal pancreatic lesion.      SPLEEN: Unremarkable.      KIDNEYS AND ADRENALS: Adrenal glands are unremarkable.Small simple  appearing right renal cyst. The kidneys are otherwise unremarkable. The  ureters are decompressed and normal in  appearance.     RETROPERITONEUM: No mass, lymphadenopathy or hemorrhage.      GI TRACT: The stomach is nondistended. Small bowel is nondilated.  Fluid-filled, distended appendix measuring up to 1.2 cm. Associated wall  thickening and mucosal hyperenhancement. Minimal adjacent inflammatory  change. The colon is nondistended.     OTHER: No intraperitoneal free fluid or free air. No peritoneal abscess.  No mesenteric adenopathy or mass. The abdominopelvic vasculature is  patent. The osseous structures and soft tissues demonstrate no worrisome  lesions.      PELVIS: No mass lesion, fluid collection or significant lymphadenopathy  is seen in the pelvis. The urinary bladder is normal in appearance.       Impression:       1. Uncomplicated acute appendicitis.     Findings and recommendations were discussed with BERNARD SCHUMACHER on  11/14/2019 at 6:43 PM CST.     This report was finalized on 11/14/2019 18:44 by Dr Cheikh Valle, .            Assessment/Plan       home      Angeles Gallagher MD  11/15/19  2:19 PM

## 2019-11-15 NOTE — ED PROVIDER NOTES
Subjective   History of Present Illness  59-year-old female presents with a chief complaint of right lower quadrant abdominal pain.  Patient reports symptoms began at 8 this morning.  She reports one year ago she was seen for this and was told she had early appendicitis that she had opted to use antibiotics and no surgery.  She was warned that she may have to return to the ER for recurrence of appendicitis.  She reports nausea but no fevers.  Review of Systems   All other systems reviewed and are negative.      Past Medical History:   Diagnosis Date   • ASCUS with positive high risk HPV    • Dysmenorrhea    • Endometrial thickening on ultra sound    • Follow-up examination after gynecological surgery    • Hypertension    • Menopausal symptoms    • Menorrhagia    • Pelvic pain    • Vaginitis        Allergies   Allergen Reactions   • Codeine        Past Surgical History:   Procedure Laterality Date   • CHOLECYSTECTOMY     • DILATATION AND CURETTAGE      of uterus       History reviewed. No pertinent family history.    Social History     Socioeconomic History   • Marital status:      Spouse name: Not on file   • Number of children: Not on file   • Years of education: Not on file   • Highest education level: Not on file   Tobacco Use   • Smoking status: Current Every Day Smoker     Packs/day: 1.00     Years: 40.00     Pack years: 40.00     Types: Cigarettes   Substance and Sexual Activity   • Alcohol use: Yes     Comment: occasional   • Drug use: No   • Sexual activity: Not Currently           Objective   Physical Exam   Constitutional: She is oriented to person, place, and time. She appears well-developed and well-nourished.   HENT:   Head: Normocephalic and atraumatic.   Eyes: EOM are normal. Pupils are equal, round, and reactive to light.   Cardiovascular: Normal rate and regular rhythm.   Pulmonary/Chest: Effort normal and breath sounds normal.   Abdominal: Normal appearance and normal aorta. There is  tenderness in the right lower quadrant.   Neurological: She is alert and oriented to person, place, and time.   Skin: Skin is warm. Capillary refill takes less than 2 seconds.   Psychiatric: She has a normal mood and affect. Her behavior is normal.   Nursing note and vitals reviewed.      Procedures           ED Course        XR Chest 2 View   Final Result   1. Stable chest exam without acute process. No visualized infiltrate.           This report was finalized on 11/14/2019 19:47 by Dr Cheikh Valle, .      CT Abdomen Pelvis With Contrast   Final Result   1. Uncomplicated acute appendicitis.       Findings and recommendations were discussed with BERNARD SCHUMACHER on   11/14/2019 at 6:43 PM CST.       This report was finalized on 11/14/2019 18:44 by Dr Cheikh Valle, .        Labs Reviewed   COMPREHENSIVE METABOLIC PANEL - Abnormal; Notable for the following components:       Result Value    Glucose 122 (*)     All other components within normal limits    Narrative:     GFR Normal >60  Chronic Kidney Disease <60  Kidney Failure <15   AMYLASE - Abnormal; Notable for the following components:    Amylase 105 (*)     All other components within normal limits   CBC WITH AUTO DIFFERENTIAL - Abnormal; Notable for the following components:    WBC 26.12 (*)     Neutrophil % 90.9 (*)     Lymphocyte % 5.1 (*)     Monocyte % 3.4 (*)     Eosinophil % 0.0 (*)     Neutrophils, Absolute 23.75 (*)     Immature Grans, Absolute 0.11 (*)     All other components within normal limits   URINALYSIS W/ CULTURE IF INDICATED - Abnormal; Notable for the following components:    Specific Gravity, UA >1.030 (*)     Ketones, UA Trace (*)     Blood, UA Small (1+) (*)     All other components within normal limits   LIPASE - Normal   RAINBOW DRAW    Narrative:     The following orders were created for panel order [place] Draw.  Procedure                               Abnormality         Status                     ---------                                -----------         ------                     Light Blue Top[[id number]]                                   Final result               Green Top (Gel)[[id number]]                                  Final result               Lavender Top[[id number]]                                     Final result               Red Top[[id number]]                                          Final result                 Please view results for these tests on the individual orders.   URINALYSIS, MICROSCOPIC ONLY   CBC AND DIFFERENTIAL    Narrative:     The following orders were created for panel order CBC & Differential.  Procedure                               Abnormality         Status                     ---------                               -----------         ------                     CBC Auto Differential[[id number]]        Abnormal            Final result                 Please view results for these tests on the individual orders.   LIGHT BLUE TOP   GREEN TOP   LAVENDER TOP   RED TOP               MDM  Number of Diagnoses or Management Options  Diagnosis management comments: Appendicitis, to the OR        Amount and/or Complexity of Data Reviewed  Clinical lab tests: ordered and reviewed  Tests in the radiology section of CPT®: ordered and reviewed  Tests in the medicine section of CPT®: reviewed and ordered  Decide to obtain previous medical records or to obtain history from someone other than the patient: yes    Risk of Complications, Morbidity, and/or Mortality  Presenting problems: moderate  Diagnostic procedures: moderate  Management options: moderate    Patient Progress  Patient progress: stable      Final diagnoses:   Acute appendicitis, unspecified acute appendicitis type              Fransico Murrieta PA-C  11/14/19 2007

## 2019-11-15 NOTE — OP NOTE
Preoperative diagnosis;  Acute appendicitis  Postoperative diagnosis:  Same  Procedure; laparoscopic appendectomy  Surgeon:  Angeles Gallagher MD  Anesthesia:  Get loc  Ebl:  Minimal  Ivf:  See anethesia  Indications:the patient is a 59-year-old female who presents for appendectomy.  The risks, benefits, complications, and possible alternatives were discussed with the patient who agreed to proceed.  Description of procedure; the patient was laid supine. The abdomen was prepped and draped.  The abdomen was entered with veress.  Trocars were placed.  The appendix was dilated and the distal third was inflamed, but nonperforated.  An opening was created thru the mesoappendix at the base of the appendix.  An endogia 45mmblue stapler was used to transect the base of the appendix.  An endogia 45 mmwhite stapler was used to transect the mesoappendix.  It was placed in an endocatch bag and removed. The fascia of the left lower quadrant port site was closed with 0 vicyrl using an endostitch passer. The skin was closed with 3-0 ad 4-0 vicryl.  The sponge, needle, and instrument counts were correct.  Complications: none  Disposition: good to pacu   Findings:  Inflamed, nonperforated

## 2019-11-18 LAB
LAB AP CASE REPORT: NORMAL
PATH REPORT.FINAL DX SPEC: NORMAL
PATH REPORT.GROSS SPEC: NORMAL

## 2019-11-19 ENCOUNTER — NURSE TRIAGE (OUTPATIENT)
Dept: CALL CENTER | Facility: HOSPITAL | Age: 59
End: 2019-11-19

## 2019-11-19 NOTE — TELEPHONE ENCOUNTER
"Questions concerning her recovery,     Reason for Disposition  • Health Information question, no triage required and triager able to answer question    Additional Information  • Negative: [1] Caller is not with the adult (patient) AND [2] reporting urgent symptoms  • Negative: Lab result questions  • Negative: Medication questions  • Negative: Caller cannot be reached by phone  • Negative: Caller has already spoken to PCP or another triager  • Negative: RN needs further essential information from caller in order to complete triage  • Negative: Requesting regular office appointment  • Negative: [1] Caller requesting NON-URGENT health information AND [2] PCP's office is the best resource    Answer Assessment - Initial Assessment Questions  1. REASON FOR CALL or QUESTION: \"What is your reason for calling today?\" or \"How can I best help you?\" or \"What question do you have that I can help answer?\"      Has questions concerning her recovery    Protocols used: INFORMATION ONLY CALL-ADULT-      "

## 2019-11-21 NOTE — H&P
Angeles Gallagher MD [facility] History and Physical     Referring Provider: Angeles Gallagher MD    Patient Care Team:  Jason Cuello MD as PCP - General (Internal Medicine)    Chief complaint abdominal pain    Subjective .     History of present illness:  The patient is a 59 y.o. female who presents complaining of abdominal pain.    Review of Systems    Review of Systems - General ROS: negative  ENT ROS: negative  Respiratory ROS: no cough, shortness of breath, or wheezing  Cardiovascular ROS: no chest pain or dyspnea on exertion  Gastrointestinal ROS: no abdominal pain, change in bowel habits, or black or bloody stools  Genito-Urinary ROS: no dysuria, trouble voiding, or hematuria  Dermatological ROS: negative   Breast ROS: negative for breast lumps  Hematological and Lymphatic ROS: negative  Musculoskeletal ROS: negative   Neurological ROS: no TIA or stroke symptoms    Psychological ROS: negative  Endocrine ROS: negative    History  Past Medical History:   Diagnosis Date   • ASCUS with positive high risk HPV    • Dysmenorrhea    • Endometrial thickening on ultra sound    • Follow-up examination after gynecological surgery    • Hypertension    • Menopausal symptoms    • Menorrhagia    • Pelvic pain    • Vaginitis    ,   Past Surgical History:   Procedure Laterality Date   • APPENDECTOMY N/A 11/14/2019    Procedure: APPENDECTOMY LAPAROSCOPIC POSSIBLE OPEN;  Surgeon: Angeles Gallagher MD;  Location: [facility];  Service: General   • CHOLECYSTECTOMY     • DILATATION AND CURETTAGE      of uterus   , History reviewed. No pertinent family history.,   Social History     Tobacco Use   • Smoking status: Current Every Day Smoker     Packs/day: 1.00     Years: 40.00     Pack years: 40.00     Types: Cigarettes   Substance Use Topics   • Alcohol use: Yes     Comment: occasional   • Drug use: No   ,   No medications prior to admission.    and Allergies:  Codeine  No current facility-administered medications for this encounter.      Current Outpatient Medications:   •  albuterol sulfate  (90 Base) MCG/ACT inhaler, , Disp: , Rfl: 0  •  lisinopril (PRINIVIL,ZESTRIL) 40 MG tablet, , Disp: , Rfl: 3  •  lisinopril (ZESTRIL) 5 MG tablet, Take 5 mg by mouth daily., Disp: , Rfl:   •  oxyCODONE-acetaminophen (PERCOCET) 5-325 MG per tablet, Take 1 tablet by mouth Every 4 (Four) Hours As Needed for Severe Pain  for up to 9 days., Disp: 30 tablet, Rfl: 0  •  pravastatin (PRAVACHOL) 10 MG tablet, Take 10 mg by mouth daily., Disp: , Rfl:   •  TRAZODONE HCL PO, Take  by mouth daily., Disp: , Rfl:   •  ZOLPIDEM TARTRATE PO, Take  by mouth. 10mg tablet, Disp: , Rfl:     Objective     Vital Signs        Physical Exam:  General appearance - alert, well appearing, and in no distress  Mental status - alert, oriented to person, place, and time  Neck - supple, no significant adenopathy  Chest - clear to auscultation, no wheezes, rales or rhonchi, symmetric air entry  Heart - normal rate, regular rhythm, normal S1, S2, no murmurs, rubs, clicks or gallops  Abdomen - soft, nontender, nondistended, no masses or organomegaly  Neurological - alert, oriented, normal speech, no focal findings or movement disorder noted  Musculoskeletal - no joint tenderness, deformity or swelling  Extremities - peripheral pulses normal, no pedal edema, no clubbing or cyanosis    Results Review:     Lab Results (last 24 hours)     Procedure Component Value Units Date/Time    CBC (No Diff) [[id number]]  (Abnormal) Collected:  11/15/19 0530    Specimen:  Blood Updated:  11/15/19 0729     WBC 12.40 10*3/mm3      RBC 3.60 10*6/mm3      Hemoglobin 11.5 g/dL      Hematocrit 34.3 %      MCV 95.3 fL      MCH 31.9 pg      MCHC 33.5 g/dL      RDW 13.7 %      RDW-SD 48.5 fl      MPV 10.5 fL      Platelets 239 10*3/mm3     Basic Metabolic Panel [[id number]]  (Abnormal) Collected:  11/15/19 0530    Specimen:  Blood Updated:  11/15/19 0634     Glucose 148 mg/dL      BUN 13 mg/dL      Creatinine  0.74 mg/dL      Sodium 136 mmol/L      Potassium 4.9 mmol/L      Chloride 99 mmol/L      CO2 29.0 mmol/L      Calcium 9.4 mg/dL      eGFR Non African Amer 80 mL/min/1.73      BUN/Creatinine Ratio 17.6     Anion Gap 8.0 mmol/L     Narrative:       GFR Normal >60  Chronic Kidney Disease <60  Kidney Failure <15    Urinalysis, Microscopic Only - Urine, Clean Catch [[id number]]  (Abnormal) Collected:  11/14/19 1933    Specimen:  Urine, Clean Catch Updated:  11/14/19 2023     RBC, UA 0-2 /HPF      WBC, UA 3-5 /HPF      Bacteria, UA None Seen /HPF      Squamous Epithelial Cells, UA 7-12 /HPF      Methodology Manual Light Microscopy    Urinalysis With Culture If Indicated - Urine, Clean Catch [[id number]]  (Abnormal) Collected:  11/14/19 1933    Specimen:  Urine, Clean Catch Updated:  11/14/19 1957     Color, UA Yellow     Appearance, UA Clear     pH, UA 5.5     Specific Gravity, UA >1.030     Glucose, UA Negative     Ketones, UA Trace     Bilirubin, UA Negative     Blood, UA Small (1+)     Protein, UA Negative     Leuk Esterase, UA Negative     Nitrite, UA Negative     Urobilinogen, UA 0.2 E.U./dL    [place] Draw [[id number]] Collected:  11/14/19 1622    Specimen:  Blood Updated:  11/14/19 1731    Narrative:       The following orders were created for panel order [place] Draw.  Procedure                               Abnormality         Status                     ---------                               -----------         ------                     Light Blue Top[[id number]]                                   Final result               Green Top (Gel)[[id number]]                                  Final result               Lavender Top[[id number]]                                     Final result               Red Top[[id number]]                                          Final result                 Please view results for these tests on the individual orders.    Light Blue Top [[id number]] Collected:  11/14/19 1622    Specimen:   Blood Updated:  11/14/19 1731     Extra Tube hold for add-on     Comment: Auto resulted       Green Top (Gel) [[id number]] Collected:  11/14/19 1622    Specimen:  Blood Updated:  11/14/19 1731     Extra Tube Hold for add-ons.     Comment: Auto resulted.       Lavender Top [[id number]] Collected:  11/14/19 1622    Specimen:  Blood Updated:  11/14/19 1731     Extra Tube hold for add-on     Comment: Auto resulted       Red Top [[id number]] Collected:  11/14/19 1622    Specimen:  Blood Updated:  11/14/19 1731     Extra Tube Hold for add-ons.     Comment: Auto resulted.       Comprehensive Metabolic Panel [[id number]]  (Abnormal) Collected:  11/14/19 1622    Specimen:  Blood Updated:  11/14/19 1704     Glucose 122 mg/dL      BUN 15 mg/dL      Creatinine 0.77 mg/dL      Sodium 138 mmol/L      Potassium 4.4 mmol/L      Chloride 98 mmol/L      CO2 27.0 mmol/L      Calcium 10.2 mg/dL      Total Protein 7.6 g/dL      Albumin 4.70 g/dL      ALT (SGPT) 12 U/L      AST (SGOT) 16 U/L      Alkaline Phosphatase 90 U/L      Total Bilirubin 0.5 mg/dL      eGFR Non African Amer 76 mL/min/1.73      Globulin 2.9 gm/dL      A/G Ratio 1.6 g/dL      BUN/Creatinine Ratio 19.5     Anion Gap 13.0 mmol/L     Narrative:       GFR Normal >60  Chronic Kidney Disease <60  Kidney Failure <15    Lipase [[id number]]  (Normal) Collected:  11/14/19 1622    Specimen:  Blood Updated:  11/14/19 1704     Lipase 26 U/L     Amylase [[id number]]  (Abnormal) Collected:  11/14/19 1622    Specimen:  Blood Updated:  11/14/19 1704     Amylase 105 U/L     CBC & Differential [[id number]] Collected:  11/14/19 1622    Specimen:  Blood Updated:  11/14/19 1642    Narrative:       The following orders were created for panel order CBC & Differential.  Procedure                               Abnormality         Status                     ---------                               -----------         ------                     CBC Auto Differential[[id number]]        Abnormal             Final result                 Please view results for these tests on the individual orders.    CBC Auto Differential [[id number]]  (Abnormal) Collected:  11/14/19 1622    Specimen:  Blood Updated:  11/14/19 1642     WBC 26.12 10*3/mm3      RBC 4.42 10*6/mm3      Hemoglobin 13.9 g/dL      Hematocrit 40.8 %      MCV 92.3 fL      MCH 31.4 pg      MCHC 34.1 g/dL      RDW 13.6 %      RDW-SD 46.6 fl      MPV 9.8 fL      Platelets 341 10*3/mm3      Neutrophil % 90.9 %      Lymphocyte % 5.1 %      Monocyte % 3.4 %      Eosinophil % 0.0 %      Basophil % 0.2 %      Immature Grans % 0.4 %      Neutrophils, Absolute 23.75 10*3/mm3      Lymphocytes, Absolute 1.33 10*3/mm3      Monocytes, Absolute 0.88 10*3/mm3      Eosinophils, Absolute 0.00 10*3/mm3      Basophils, Absolute 0.05 10*3/mm3      Immature Grans, Absolute 0.11 10*3/mm3      nRBC 0.0 /100 WBC         Imaging Results (Last 24 Hours)     Procedure Component Value Units Date/Time    XR Chest 2 View [[id number]] Collected:  11/14/19 1946     Updated:  11/14/19 1950    Narrative:       XR CHEST 2 VW- 11/14/2019 7:25 PM CST     HISTORY: cough; K35.80-Unspecified acute appendicitis       COMPARISON: Chest exam dated 12/28/2018.     FINDINGS:   Left lung calcified granuloma. No consolidation. No pleural effusion or  pneumothorax. The cardiomediastinal silhouette and pulmonary vascularity  are within normal limits.      The osseous structures and surrounding soft tissues demonstrate no acute  abnormality.       Impression:       1. Stable chest exam without acute process. No visualized infiltrate.        This report was finalized on 11/14/2019 19:47 by Dr Cheikh Valle, .    CT Abdomen Pelvis With Contrast [[id number]] Collected:  11/14/19 1839     Updated:  11/14/19 1849    Narrative:       CT ABDOMEN PELVIS W CONTRAST- 11/14/2019 6:02 PM CST     HISTORY: rlq pain       COMPARISON: None.      DOSE LENGTH PRODUCT: 250 mGy cm. Automated exposure control was also  utilized to  decrease patient radiation dose.     TECHNIQUE: Following the intravenous administration of contrast, helical  CT tomographic images of the abdomen and pelvis were acquired.  Multiplanar reformatted images were provided for review.      FINDINGS:   LOWER CHEST: The lung bases and included mediastinal structures are  unremarkable.      LIVER: No suspicious liver lesion. The portal veins are patent..      BILIARY SYSTEM: Partial gallbladder.      PANCREAS: No focal pancreatic lesion.      SPLEEN: Unremarkable.      KIDNEYS AND ADRENALS: Adrenal glands are unremarkable.Small simple  appearing right renal cyst. The kidneys are otherwise unremarkable. The  ureters are decompressed and normal in appearance.     RETROPERITONEUM: No mass, lymphadenopathy or hemorrhage.      GI TRACT: The stomach is nondistended. Small bowel is nondilated.  Fluid-filled, distended appendix measuring up to 1.2 cm. Associated wall  thickening and mucosal hyperenhancement. Minimal adjacent inflammatory  change. The colon is nondistended.     OTHER: No intraperitoneal free fluid or free air. No peritoneal abscess.  No mesenteric adenopathy or mass. The abdominopelvic vasculature is  patent. The osseous structures and soft tissues demonstrate no worrisome  lesions.      PELVIS: No mass lesion, fluid collection or significant lymphadenopathy  is seen in the pelvis. The urinary bladder is normal in appearance.       Impression:       1. Uncomplicated acute appendicitis.     Findings and recommendations were discussed with BERNARD SCHUMACHER on  11/14/2019 at 6:43 PM CST.     This report was finalized on 11/14/2019 18:44 by Dr Cheikh Valle, .            Assessment/Plan       Acute appendicitis.  She will undergo laparoscopic appendectomy with possible need for conversion to open.      Angeles Gallagher MD  11/21/19  6:19 AM

## 2019-11-27 ENCOUNTER — OFFICE VISIT (OUTPATIENT)
Dept: OBSTETRICS AND GYNECOLOGY | Facility: CLINIC | Age: 59
End: 2019-11-27

## 2019-11-27 VITALS
SYSTOLIC BLOOD PRESSURE: 124 MMHG | BODY MASS INDEX: 20.73 KG/M2 | HEIGHT: 66 IN | DIASTOLIC BLOOD PRESSURE: 68 MMHG | WEIGHT: 129 LBS

## 2019-11-27 DIAGNOSIS — A63.0 VAGINAL VENEREAL WARTS: Primary | ICD-10-CM

## 2019-11-27 DIAGNOSIS — F17.200 SMOKER: ICD-10-CM

## 2019-11-27 PROCEDURE — 99213 OFFICE O/P EST LOW 20 MIN: CPT | Performed by: OBSTETRICS & GYNECOLOGY

## 2019-11-27 NOTE — PROGRESS NOTES
Subjective   Elizabeth Carrillo is a 59 y.o. female is here today for follow-up.    Patient presents today to follow-up on vaginal warts.    History of Present Illness     1 month ago, she was treated with TCA.  She is having some tenderness and concerns over healing.  She is otherwise without complaints.    The following portions of the patient's history were reviewed and updated as appropriate: allergies, current medications, past family history, past medical history, past social history, past surgical history and problem list.    Review of Systems   All other systems reviewed and are negative.      Objective   Physical Exam   Constitutional: She appears well-developed and well-nourished.   HENT:   Head: Normocephalic and atraumatic.   Genitourinary:       Pelvic exam was performed with patient supine.   Nursing note and vitals reviewed.        Assessment/Plan   Elizabeth was seen today for follow-up.    Diagnoses and all orders for this visit:    Vaginal venereal warts    Smoker      Keep clean and dry.  Call for concerns or questions.  Return office in 1 month.    Jsoe M Chatman MD

## 2020-01-31 ENCOUNTER — OFFICE VISIT (OUTPATIENT)
Dept: INTERNAL MEDICINE | Facility: CLINIC | Age: 60
End: 2020-01-31

## 2020-01-31 VITALS
SYSTOLIC BLOOD PRESSURE: 116 MMHG | WEIGHT: 132 LBS | DIASTOLIC BLOOD PRESSURE: 60 MMHG | OXYGEN SATURATION: 97 % | HEIGHT: 66 IN | BODY MASS INDEX: 21.21 KG/M2 | RESPIRATION RATE: 16 BRPM | HEART RATE: 86 BPM

## 2020-01-31 DIAGNOSIS — R10.2 PELVIC PAIN: ICD-10-CM

## 2020-01-31 DIAGNOSIS — R31.29 OTHER MICROSCOPIC HEMATURIA: ICD-10-CM

## 2020-01-31 DIAGNOSIS — Z12.4 SCREENING FOR MALIGNANT NEOPLASM OF THE CERVIX: Primary | ICD-10-CM

## 2020-01-31 DIAGNOSIS — I10 ESSENTIAL HYPERTENSION: ICD-10-CM

## 2020-01-31 PROCEDURE — 99214 OFFICE O/P EST MOD 30 MIN: CPT | Performed by: INTERNAL MEDICINE

## 2020-01-31 RX ORDER — OXYCODONE HYDROCHLORIDE AND ACETAMINOPHEN 5; 325 MG/1; MG/1
TABLET ORAL
COMMUNITY
Start: 2020-01-22 | End: 2020-03-04

## 2020-01-31 NOTE — PROGRESS NOTES
Subjective   Elizabeth Carrillo is a 59 y.o. female.   Chief Complaint   Patient presents with   • Annual Exam       Elizabeth is here today for an annual checkup.  She is also complaining of postoperative pain in the pelvic area specifically in the left pelvic area.  Elliott did an appendectomy on her which apparently went well however patient has had pain in her left pelvis since that time states that Dr. Gallagher said that she does have sutures in that area and she may be feeling some of that she wants me to fully evaluate to see if there are any other problems.       The following portions of the patient's history were reviewed and updated as appropriate: allergies, current medications, past family history, past medical history, past social history, past surgical history and problem list.    Review of Systems   Constitutional: Negative for activity change, appetite change, fatigue, fever, unexpected weight gain and unexpected weight loss.   HENT: Negative for swollen glands, trouble swallowing and voice change.    Eyes: Negative for blurred vision and visual disturbance.   Respiratory: Negative for cough and shortness of breath.    Cardiovascular: Negative for chest pain, palpitations and leg swelling.   Gastrointestinal: Negative for abdominal pain, constipation, diarrhea, nausea, vomiting and indigestion.   Endocrine: Negative for cold intolerance, heat intolerance, polydipsia and polyphagia.   Genitourinary: Negative for dysuria and frequency. Pelvic pain: Left side.   Musculoskeletal: Negative for arthralgias, joint swelling and neck pain. Back pain:  Mid thoracic.   Skin: Negative for color change, rash and skin lesions.   Neurological: Negative for dizziness, weakness, headache, memory problem and confusion.   Hematological: Does not bruise/bleed easily.   Psychiatric/Behavioral: Negative for agitation, hallucinations and suicidal ideas. The patient is not nervous/anxious.        Objective   Past Medical History:      Diagnosis Date   • ASCUS with positive high risk HPV    • Dysmenorrhea    • Endometrial thickening on ultra sound    • Follow-up examination after gynecological surgery    • Hypertension    • Menopausal symptoms    • Menorrhagia    • Pelvic pain    • Vaginitis       Past Surgical History:   Procedure Laterality Date   • APPENDECTOMY N/A 11/14/2019    Procedure: APPENDECTOMY LAPAROSCOPIC POSSIBLE OPEN;  Surgeon: Angeles Gallagher MD;  Location: [facility] OR;  Service: General   • CHOLECYSTECTOMY     • DILATATION AND CURETTAGE      of uterus        Current Outpatient Medications:   •  lisinopril (PRINIVIL,ZESTRIL) 40 MG tablet, , Disp: , Rfl: 3  •  pravastatin (PRAVACHOL) 10 MG tablet, Take 10 mg by mouth daily., Disp: , Rfl:   •  traZODone (DESYREL) 100 MG tablet, Take 100 mg by mouth Daily., Disp: , Rfl:   •  ZOLPIDEM TARTRATE PO, Take  by mouth. 10mg tablet, Disp: , Rfl:   •  albuterol sulfate  (90 Base) MCG/ACT inhaler, , Disp: , Rfl: 0  •  oxyCODONE-acetaminophen (PERCOCET) 5-325 MG per tablet, , Disp: , Rfl:    Vitals:    01/31/20 1416   BP: 116/60   Pulse: 86   Resp: 16   SpO2: 97%     Physical Exam   Constitutional: She is oriented to person, place, and time. She appears well-developed and well-nourished.   HENT:   Head: Normocephalic and atraumatic.   Eyes: Pupils are equal, round, and reactive to light. Conjunctivae and EOM are normal.   Neck: Normal range of motion. Neck supple.   Cardiovascular: Normal rate, regular rhythm, normal heart sounds and intact distal pulses.   Pulmonary/Chest: Effort normal and breath sounds normal.   Abdominal: Soft. Bowel sounds are normal.   Genitourinary:         Genitourinary Comments: Had a number of reddened areas on her buttock and perineal area which she says Dr. Beltrán had treated for warts her cervix appeared normal there was no discharge however she was very tender as I palpated her left ovary her uterus appeared to be normal I did not find any ovarian  enlargement   Musculoskeletal: Normal range of motion.   Neurological: She is alert and oriented to person, place, and time.   Skin: Skin is warm and dry.   Psychiatric: She has a normal mood and affect. Her behavior is normal.         Patient's Body mass index is 21.31 kg/m². BMI is within normal parameters. No follow-up required..      Assessment/Plan   Diagnoses and all orders for this visit:    1. Screening for malignant neoplasm of the cervix (Primary)  -     Pap IG, Rfx HPV All Pth  -     US Non-ob Transvaginal; Future  -     US Non-ob Transvaginal    2. Essential hypertension    3. Pelvic pain        Patient's pelvic pain seems to be centered over her left ovary because of that we will get a get a transvaginal ultrasound wants that done at lumbar radiology    Had the opportunity to review her transvaginal ultrasound which is associated with this visit they are indicating that her cervix has some nodularity cysts or other problems and since we did not get a good Pap smear I have left a message today and also talked to her yesterday about seeing  to possibly get colposcopy her for a better evaluation of her cervix she has seen Dr. Beltrán as mentioned earlier in the note.  If this does not show anything or if there are no problems there then seeing urology and possibly doing a cystoscope is an option for her.  I have given her that information by phone by a message we will follow-up with the appointment with Dr. Beltrán

## 2020-02-04 NOTE — PROGRESS NOTES
"Chief Complaint   Patient presents with   • Abdominal Pain     having lower abdomoinal pain is scheduled for us tomorrow been 20 years ago thinks dr. carroll       PCP: Jason Cuello MD  REFER: Angeles Gallagher MD    Subjective     HPI    Patient presents to office with complaints of RLQ abdominal pain with radiation to LLQ. She has difficulty articulating description of pain, states occasional pain will be \"sharp, pulses.\"  She is up and down from her chair at work and notices pain worse at night.  Otherwise no aggravating or alleviating factors.  Denies bowels or eating affecting pain.  Pain has been constant since appendectomy 11/2019.  Pain varies through day.  Pain does not keep her from sleeping.  No change in bowels.  Bowels move without difficulty.  No bright red blood per rectum, no melena.  No constipation.  No nausea/vomitting.  No heartburn or indigestion.   No weight loss.  Colonoscopy 20 years ago.    Negative CT a/p (media tab)-Jan 2020  Ultrsound at [facility] 2/6/2020      Past Medical History:   Diagnosis Date   • ASCUS with positive high risk HPV    • Dysmenorrhea    • Endometrial thickening on ultra sound    • Follow-up examination after gynecological surgery    • Hypertension    • Menopausal symptoms    • Menorrhagia    • Pelvic pain    • Vaginitis        Past Surgical History:   Procedure Laterality Date   • APPENDECTOMY N/A 11/14/2019    Procedure: APPENDECTOMY LAPAROSCOPIC POSSIBLE OPEN;  Surgeon: Angeles Gallagher MD;  Location: [facility];  Service: General   • CHOLECYSTECTOMY     • DILATATION AND CURETTAGE      of uterus       Outpatient Medications Marked as Taking for the 2/5/20 encounter (Office Visit) with Robert Richmond APRN   Medication Sig Dispense Refill   • lisinopril (PRINIVIL,ZESTRIL) 40 MG tablet   3   • pravastatin (PRAVACHOL) 10 MG tablet Take 10 mg by mouth daily.     • traZODone (DESYREL) 100 MG tablet Take 100 mg by mouth Daily.     • ZOLPIDEM TARTRATE PO Take  by " "mouth. 10mg tablet         Allergies   Allergen Reactions   • Codeine        Social History     Socioeconomic History   • Marital status:      Spouse name: Not on file   • Number of children: Not on file   • Years of education: Not on file   • Highest education level: Not on file   Tobacco Use   • Smoking status: Current Every Day Smoker     Packs/day: 1.00     Years: 40.00     Pack years: 40.00     Types: Cigarettes   • Smokeless tobacco: Never Used   Substance and Sexual Activity   • Alcohol use: Yes     Comment: occasional   • Drug use: No   • Sexual activity: Not Currently       Family History   Problem Relation Age of Onset   • Colon cancer Neg Hx    • Colon polyps Neg Hx        Review of Systems   Constitutional: Negative for fatigue, fever and unexpected weight change.   HENT: Negative for hearing loss, sore throat and voice change.    Eyes: Negative for visual disturbance.   Respiratory: Negative for cough, shortness of breath and wheezing.    Cardiovascular: Negative for chest pain and palpitations.   Gastrointestinal: Positive for abdominal pain. Negative for blood in stool and vomiting.   Endocrine: Negative for polydipsia and polyuria.   Genitourinary: Negative for difficulty urinating, dysuria, hematuria and urgency.   Musculoskeletal: Negative for joint swelling and myalgias.   Skin: Negative for color change, rash and wound.   Neurological: Negative for dizziness, tremors, seizures and syncope.   Hematological: Does not bruise/bleed easily.   Psychiatric/Behavioral: Negative for agitation and confusion. The patient is not nervous/anxious.        Objective     Vitals:    02/05/20 0951   BP: 120/70   Pulse: 84   Temp: 98.4 °F (36.9 °C)   SpO2: 98%   Weight: 59.4 kg (131 lb)   Height: 167.6 cm (66\")     Body mass index is 21.14 kg/m².    Physical Exam   Constitutional: She is oriented to person, place, and time. She appears well-developed and well-nourished. She is cooperative.   HENT:   Head: " Normocephalic and atraumatic.   Eyes: Pupils are equal, round, and reactive to light. Conjunctivae are normal. No scleral icterus.   Neck: Normal range of motion. Neck supple. No JVD present. No thyroid mass and no thyromegaly present.   Cardiovascular: Normal rate, regular rhythm and normal heart sounds. Exam reveals no gallop and no friction rub.   No murmur heard.  Pulmonary/Chest: Effort normal and breath sounds normal. No accessory muscle usage. No respiratory distress. She has no wheezes. She has no rales.   Abdominal: Soft. Normal appearance and bowel sounds are normal. She exhibits no distension, no ascites and no mass. There is no hepatosplenomegaly. There is tenderness (lowr abdomen). There is no rebound and no guarding.   Musculoskeletal: Normal range of motion. She exhibits no edema or tenderness.     Vascular Status -  Her right foot exhibits normal foot vasculature  and no edema. Her left foot exhibits normal foot vasculature  and no edema.  Lymphadenopathy:     She has no cervical adenopathy.   Neurological: She is alert and oriented to person, place, and time. She has normal strength. Gait normal.   Skin: Skin is warm, dry and intact. No rash noted.       Imaging Results (Most Recent)     None          Body mass index is 21.14 kg/m².    Assessment/Plan     Elizabeth was seen today for abdominal pain.    Diagnoses and all orders for this visit:    Lower abdominal pain  -     Case Request; Standing  -     Case Request    Other orders  -     CLENPIQ 10-3.5-12 MG-GM -GM/160ML solution; Take 1 each by mouth Take As Directed.        COLONOSCOPY WITH ANESTHESIA (N/A)     Will need to review ultrasound    Patient is to continue all blood pressure and cardiac medications prior to procedure and has been advised to take medications morning of procedure   Pt verbalized understanding    All risks, benefits, alternatives, and indications of colonoscopy procedure have been discussed with the patient. Risks to include  perforation of the colon requiring possible surgery or colostomy, risk of bleeding from biopsies or removal of colon tissue, possibility of missing a colon polyp or cancer, or adverse drug reaction.  Benefits to include the diagnosis and management of disease of the colon and rectum. Alternatives to include barium enema, radiographic evaluation, lab testing or no intervention. Pt verbalizes understanding and agrees to proceed with procedure.            There are no Patient Instructions on file for this visit.

## 2020-02-04 NOTE — H&P (VIEW-ONLY)
"Chief Complaint   Patient presents with   • Abdominal Pain     having lower abdomoinal pain is scheduled for us tomorrow been 20 years ago thinks dr. carroll       PCP: Jason Cuello MD  REFER: Angeles Gallagher MD    Subjective     HPI    Patient presents to office with complaints of RLQ abdominal pain with radiation to LLQ. She has difficulty articulating description of pain, states occasional pain will be \"sharp, pulses.\"  She is up and down from her chair at work and notices pain worse at night.  Otherwise no aggravating or alleviating factors.  Denies bowels or eating affecting pain.  Pain has been constant since appendectomy 11/2019.  Pain varies through day.  Pain does not keep her from sleeping.  No change in bowels.  Bowels move without difficulty.  No bright red blood per rectum, no melena.  No constipation.  No nausea/vomitting.  No heartburn or indigestion.   No weight loss.  Colonoscopy 20 years ago.    Negative CT a/p (media tab)-Jan 2020  Ultrsound at [facility] 2/6/2020      Past Medical History:   Diagnosis Date   • ASCUS with positive high risk HPV    • Dysmenorrhea    • Endometrial thickening on ultra sound    • Follow-up examination after gynecological surgery    • Hypertension    • Menopausal symptoms    • Menorrhagia    • Pelvic pain    • Vaginitis        Past Surgical History:   Procedure Laterality Date   • APPENDECTOMY N/A 11/14/2019    Procedure: APPENDECTOMY LAPAROSCOPIC POSSIBLE OPEN;  Surgeon: Angeles Gallagher MD;  Location: [facility];  Service: General   • CHOLECYSTECTOMY     • DILATATION AND CURETTAGE      of uterus       Outpatient Medications Marked as Taking for the 2/5/20 encounter (Office Visit) with Robert Richmond APRN   Medication Sig Dispense Refill   • lisinopril (PRINIVIL,ZESTRIL) 40 MG tablet   3   • pravastatin (PRAVACHOL) 10 MG tablet Take 10 mg by mouth daily.     • traZODone (DESYREL) 100 MG tablet Take 100 mg by mouth Daily.     • ZOLPIDEM TARTRATE PO Take  by " "mouth. 10mg tablet         Allergies   Allergen Reactions   • Codeine        Social History     Socioeconomic History   • Marital status:      Spouse name: Not on file   • Number of children: Not on file   • Years of education: Not on file   • Highest education level: Not on file   Tobacco Use   • Smoking status: Current Every Day Smoker     Packs/day: 1.00     Years: 40.00     Pack years: 40.00     Types: Cigarettes   • Smokeless tobacco: Never Used   Substance and Sexual Activity   • Alcohol use: Yes     Comment: occasional   • Drug use: No   • Sexual activity: Not Currently       Family History   Problem Relation Age of Onset   • Colon cancer Neg Hx    • Colon polyps Neg Hx        Review of Systems   Constitutional: Negative for fatigue, fever and unexpected weight change.   HENT: Negative for hearing loss, sore throat and voice change.    Eyes: Negative for visual disturbance.   Respiratory: Negative for cough, shortness of breath and wheezing.    Cardiovascular: Negative for chest pain and palpitations.   Gastrointestinal: Positive for abdominal pain. Negative for blood in stool and vomiting.   Endocrine: Negative for polydipsia and polyuria.   Genitourinary: Negative for difficulty urinating, dysuria, hematuria and urgency.   Musculoskeletal: Negative for joint swelling and myalgias.   Skin: Negative for color change, rash and wound.   Neurological: Negative for dizziness, tremors, seizures and syncope.   Hematological: Does not bruise/bleed easily.   Psychiatric/Behavioral: Negative for agitation and confusion. The patient is not nervous/anxious.        Objective     Vitals:    02/05/20 0951   BP: 120/70   Pulse: 84   Temp: 98.4 °F (36.9 °C)   SpO2: 98%   Weight: 59.4 kg (131 lb)   Height: 167.6 cm (66\")     Body mass index is 21.14 kg/m².    Physical Exam   Constitutional: She is oriented to person, place, and time. She appears well-developed and well-nourished. She is cooperative.   HENT:   Head: " Normocephalic and atraumatic.   Eyes: Pupils are equal, round, and reactive to light. Conjunctivae are normal. No scleral icterus.   Neck: Normal range of motion. Neck supple. No JVD present. No thyroid mass and no thyromegaly present.   Cardiovascular: Normal rate, regular rhythm and normal heart sounds. Exam reveals no gallop and no friction rub.   No murmur heard.  Pulmonary/Chest: Effort normal and breath sounds normal. No accessory muscle usage. No respiratory distress. She has no wheezes. She has no rales.   Abdominal: Soft. Normal appearance and bowel sounds are normal. She exhibits no distension, no ascites and no mass. There is no hepatosplenomegaly. There is tenderness (lowr abdomen). There is no rebound and no guarding.   Musculoskeletal: Normal range of motion. She exhibits no edema or tenderness.     Vascular Status -  Her right foot exhibits normal foot vasculature  and no edema. Her left foot exhibits normal foot vasculature  and no edema.  Lymphadenopathy:     She has no cervical adenopathy.   Neurological: She is alert and oriented to person, place, and time. She has normal strength. Gait normal.   Skin: Skin is warm, dry and intact. No rash noted.       Imaging Results (Most Recent)     None          Body mass index is 21.14 kg/m².    Assessment/Plan     Elizabeth was seen today for abdominal pain.    Diagnoses and all orders for this visit:    Lower abdominal pain  -     Case Request; Standing  -     Case Request    Other orders  -     CLENPIQ 10-3.5-12 MG-GM -GM/160ML solution; Take 1 each by mouth Take As Directed.        COLONOSCOPY WITH ANESTHESIA (N/A)     Will need to review ultrasound    Patient is to continue all blood pressure and cardiac medications prior to procedure and has been advised to take medications morning of procedure   Pt verbalized understanding    All risks, benefits, alternatives, and indications of colonoscopy procedure have been discussed with the patient. Risks to include  perforation of the colon requiring possible surgery or colostomy, risk of bleeding from biopsies or removal of colon tissue, possibility of missing a colon polyp or cancer, or adverse drug reaction.  Benefits to include the diagnosis and management of disease of the colon and rectum. Alternatives to include barium enema, radiographic evaluation, lab testing or no intervention. Pt verbalizes understanding and agrees to proceed with procedure.            There are no Patient Instructions on file for this visit.

## 2020-02-05 ENCOUNTER — OFFICE VISIT (OUTPATIENT)
Dept: GASTROENTEROLOGY | Facility: CLINIC | Age: 60
End: 2020-02-05

## 2020-02-05 VITALS
OXYGEN SATURATION: 98 % | TEMPERATURE: 98.4 F | WEIGHT: 131 LBS | HEIGHT: 66 IN | DIASTOLIC BLOOD PRESSURE: 70 MMHG | SYSTOLIC BLOOD PRESSURE: 120 MMHG | BODY MASS INDEX: 21.05 KG/M2 | HEART RATE: 84 BPM

## 2020-02-05 DIAGNOSIS — R10.30 LOWER ABDOMINAL PAIN: Primary | ICD-10-CM

## 2020-02-05 LAB
CONV .: NORMAL
CYTOLOGIST CVX/VAG CYTO: NORMAL
CYTOLOGY CVX/VAG DOC CYTO: NORMAL
CYTOLOGY CVX/VAG DOC THIN PREP: NORMAL
DX ICD CODE: NORMAL
HIV 1 & 2 AB SER-IMP: NORMAL
Lab: NORMAL
OTHER STN SPEC: NORMAL
RECOM F/U CVX/VAG CYTO: NORMAL
STAT OF ADQ CVX/VAG CYTO-IMP: NORMAL

## 2020-02-05 PROCEDURE — 99214 OFFICE O/P EST MOD 30 MIN: CPT | Performed by: NURSE PRACTITIONER

## 2020-02-05 RX ORDER — SODIUM PICOSULFATE, MAGNESIUM OXIDE, AND ANHYDROUS CITRIC ACID 10; 3.5; 12 MG/160ML; G/160ML; G/160ML
1 LIQUID ORAL TAKE AS DIRECTED
Qty: 320 ML | Refills: 0 | Status: ON HOLD | OUTPATIENT
Start: 2020-02-05 | End: 2020-02-20

## 2020-02-06 PROBLEM — R10.30 LOWER ABDOMINAL PAIN: Status: ACTIVE | Noted: 2020-02-06

## 2020-02-07 ENCOUNTER — DOCUMENTATION (OUTPATIENT)
Dept: INTERNAL MEDICINE | Facility: CLINIC | Age: 60
End: 2020-02-07

## 2020-02-20 ENCOUNTER — ANESTHESIA EVENT (OUTPATIENT)
Dept: GASTROENTEROLOGY | Facility: HOSPITAL | Age: 60
End: 2020-02-20

## 2020-02-20 ENCOUNTER — HOSPITAL ENCOUNTER (OUTPATIENT)
Facility: HOSPITAL | Age: 60
Setting detail: HOSPITAL OUTPATIENT SURGERY
Discharge: HOME | End: 2020-02-20
Attending: INTERNAL MEDICINE | Admitting: INTERNAL MEDICINE

## 2020-02-20 ENCOUNTER — ANESTHESIA (OUTPATIENT)
Dept: GASTROENTEROLOGY | Facility: HOSPITAL | Age: 60
End: 2020-02-20

## 2020-02-20 VITALS
HEART RATE: 85 BPM | TEMPERATURE: 97.9 F | BODY MASS INDEX: 20.25 KG/M2 | SYSTOLIC BLOOD PRESSURE: 104 MMHG | RESPIRATION RATE: 17 BRPM | DIASTOLIC BLOOD PRESSURE: 79 MMHG | HEIGHT: 66 IN | WEIGHT: 126 LBS | OXYGEN SATURATION: 100 %

## 2020-02-20 DIAGNOSIS — R10.30 LOWER ABDOMINAL PAIN: ICD-10-CM

## 2020-02-20 PROCEDURE — 45378 DIAGNOSTIC COLONOSCOPY: CPT | Performed by: INTERNAL MEDICINE

## 2020-02-20 PROCEDURE — 25010000002 PROPOFOL 10 MG/ML EMULSION: Performed by: NURSE ANESTHETIST, CERTIFIED REGISTERED

## 2020-02-20 RX ORDER — SODIUM CHLORIDE 0.9 % (FLUSH) 0.9 %
10 SYRINGE (ML) INJECTION AS NEEDED
Status: DISCONTINUED | OUTPATIENT
Start: 2020-02-20 | End: 2020-02-20 | Stop reason: HOSPADM

## 2020-02-20 RX ORDER — SODIUM CHLORIDE 9 MG/ML
500 INJECTION, SOLUTION INTRAVENOUS CONTINUOUS PRN
Status: DISCONTINUED | OUTPATIENT
Start: 2020-02-20 | End: 2020-02-20 | Stop reason: HOSPADM

## 2020-02-20 RX ORDER — PROPOFOL 10 MG/ML
VIAL (ML) INTRAVENOUS AS NEEDED
Status: DISCONTINUED | OUTPATIENT
Start: 2020-02-20 | End: 2020-02-20 | Stop reason: SURG

## 2020-02-20 RX ADMIN — PROPOFOL 200 MG: 10 INJECTION, EMULSION INTRAVENOUS at 10:37

## 2020-02-20 RX ADMIN — SODIUM CHLORIDE 500 ML: 9 INJECTION, SOLUTION INTRAVENOUS at 09:56

## 2020-02-20 RX ADMIN — LIDOCAINE HYDROCHLORIDE 50 MG: 20 INJECTION, SOLUTION INTRAVENOUS at 10:35

## 2020-02-20 NOTE — ANESTHESIA PREPROCEDURE EVALUATION
Anesthesia Evaluation     Patient summary reviewed   no history of anesthetic complications:  NPO Solid Status: > 8 hours  NPO Liquid Status: > 6 hours           Airway   Mallampati: I  TM distance: >3 FB  Neck ROM: full  No difficulty expected  Dental - normal exam     Pulmonary    (+) a smoker Current Smoked day of surgery,   Cardiovascular   Exercise tolerance: good (4-7 METS)    (+) hypertension, hyperlipidemia,   (-) past MI, CAD, cardiac stents      Neuro/Psych- negative ROS  (-) seizures, TIA, CVA  GI/Hepatic/Renal/Endo - negative ROS     Musculoskeletal     Abdominal    Substance History      OB/GYN          Other                        Anesthesia Plan    ASA 2     MAC     intravenous induction     Anesthetic plan, all risks, benefits, and alternatives have been provided, discussed and informed consent has been obtained with: patient.

## 2020-02-20 NOTE — ANESTHESIA POSTPROCEDURE EVALUATION
Patient: Elizabeth Carrillo    Procedure Summary     Date:  02/20/20 Room / Location:  [facility] ENDOSCOPY 4 / [facility]    Anesthesia Start:  1035 Anesthesia Stop:  1056    Procedure:  COLONOSCOPY WITH ANESTHESIA (N/A ) Diagnosis:       Lower abdominal pain      (Lower abdominal pain [R10.30])    Surgeon:  Thaddeus Meraz DO Provider:  Delroy Jones CRNA    Anesthesia Type:  MAC ASA Status:  2          Anesthesia Type: MAC    Vitals  No vitals data found for the desired time range.          Post Anesthesia Care and Evaluation    Patient location during evaluation: PACU  Patient participation: complete - patient participated  Level of consciousness: awake and awake and alert  Pain score: 0  Pain management: adequate  Airway patency: patent  Anesthetic complications: No anesthetic complications    Cardiovascular status: acceptable and stable  Respiratory status: acceptable and unassisted  Hydration status: acceptable

## 2020-02-21 ENCOUNTER — TELEPHONE (OUTPATIENT)
Dept: GASTROENTEROLOGY | Facility: CLINIC | Age: 60
End: 2020-02-21

## 2020-03-02 ENCOUNTER — OFFICE VISIT (OUTPATIENT)
Dept: OBSTETRICS AND GYNECOLOGY | Facility: CLINIC | Age: 60
End: 2020-03-02

## 2020-03-02 VITALS
SYSTOLIC BLOOD PRESSURE: 100 MMHG | WEIGHT: 131 LBS | HEIGHT: 66 IN | BODY MASS INDEX: 21.05 KG/M2 | DIASTOLIC BLOOD PRESSURE: 64 MMHG

## 2020-03-02 DIAGNOSIS — F17.200 SMOKER: ICD-10-CM

## 2020-03-02 DIAGNOSIS — N88.8 NABOTHIAN CYST: ICD-10-CM

## 2020-03-02 DIAGNOSIS — T14.8XXA MUSCULOSKELETAL STRAIN: ICD-10-CM

## 2020-03-02 DIAGNOSIS — R10.30 LOWER ABDOMINAL PAIN: Primary | ICD-10-CM

## 2020-03-02 PROCEDURE — 99213 OFFICE O/P EST LOW 20 MIN: CPT | Performed by: OBSTETRICS & GYNECOLOGY

## 2020-03-02 RX ORDER — METAXALONE 800 MG/1
800 TABLET ORAL 3 TIMES DAILY PRN
Qty: 60 TABLET | Refills: 3 | Status: SHIPPED | OUTPATIENT
Start: 2020-03-02 | End: 2021-03-11

## 2020-03-02 RX ORDER — CELECOXIB 100 MG/1
200 CAPSULE ORAL DAILY
Qty: 30 CAPSULE | Refills: 2 | Status: SHIPPED | OUTPATIENT
Start: 2020-03-02 | End: 2021-03-02

## 2020-03-02 NOTE — PROGRESS NOTES
Subjective   Elizabeth Carrillo is a 60 y.o. female is being seen for consultation today at the request of Jason Cuello MD     Patient presents today secondary to abnormal ultrasound as well as abdominal pelvic pain    History of Present Illness     Pain is been present since she had an appendectomy.  Following an appendectomy, she did have some coughing and sneezing.  She thinks that it started at that time.  It is located in the lower abdominal area.  It is worse with certain activity.  She is having no vaginal bleeding    A transvaginal ultrasound was done at an outlying facility.  Images are not available for my review.  Upon description, the uterus appears to be normal.  The ovaries are normal.  The description of the cervix sounds like nabothian cyst.    The following portions of the patient's history were reviewed and updated as appropriate: allergies, current medications, past family history, past medical history, past social history, past surgical history and problem list.    Review of Systems   Gastrointestinal: Positive for abdominal pain.   Genitourinary: Positive for pelvic pain.   All other systems reviewed and are negative.      Objective   Physical Exam   Constitutional: She is oriented to person, place, and time. She appears well-developed and well-nourished.   HENT:   Head: Normocephalic and atraumatic.   Abdominal: Soft. Bowel sounds are normal.   Point tenderness is elicited over the rectus abdominis muscle bilaterally.  This is in the lower quadrants approximately FDC between the umbilicus and the pubic symphysis.  It is worse with straining.   Genitourinary:   Genitourinary Comments: Cervix is grossly normal.  There are no lesions or masses.  Uterus is mobile.  It is nontender.   Neurological: She is alert and oriented to person, place, and time.   Skin: Skin is warm and dry.   Psychiatric: She has a normal mood and affect. Her behavior is normal. Judgment and thought content normal.    Nursing note and vitals reviewed.        Assessment/Plan   Elizabeth was seen today for pelvic pain.    Diagnoses and all orders for this visit:    Lower abdominal pain    Musculoskeletal strain    Nabothian cyst    Smoker    Other orders  -     celecoxib (CELEBREX) 100 MG capsule; Take 2 capsules by mouth Daily.  -     metaxalone (SKELAXIN) 800 MG tablet; Take 1 tablet by mouth 3 (Three) Times a Day As Needed for Muscle Spasms.      Recommend heat and rest in addition to the nonsteroidals and muscle relaxer.  Return office in 1 month.  Call for concerns or questions.    Jose M Chatman MD

## 2020-03-04 ENCOUNTER — OFFICE VISIT (OUTPATIENT)
Dept: INTERNAL MEDICINE | Facility: CLINIC | Age: 60
End: 2020-03-04

## 2020-03-04 VITALS
DIASTOLIC BLOOD PRESSURE: 66 MMHG | OXYGEN SATURATION: 98 % | HEIGHT: 66 IN | BODY MASS INDEX: 21.05 KG/M2 | SYSTOLIC BLOOD PRESSURE: 126 MMHG | HEART RATE: 75 BPM | WEIGHT: 131 LBS

## 2020-03-04 DIAGNOSIS — R10.30 LOWER ABDOMINAL PAIN: Primary | ICD-10-CM

## 2020-03-04 DIAGNOSIS — R10.2 PELVIC PAIN: ICD-10-CM

## 2020-03-04 PROCEDURE — 99214 OFFICE O/P EST MOD 30 MIN: CPT | Performed by: INTERNAL MEDICINE

## 2020-03-04 NOTE — PROGRESS NOTES
Subjective   Elizabeth Carrillo is a 60 y.o. female.   Chief Complaint   Patient presents with   • Pelvic Pain     1 month followup       This is a 1 month follow-up for patient having abdominal pain after surgery.  Since that last visit she has had a colonoscopy which shows some sigmoid diverticulosis no evidence of diverticulitis.  She had a transvaginal ultrasound that showed a complex cystic lesion in her cervix this was followed up by her gynecologist and felt to be a benign cyst and not associated with her abdominal pain.  Dr. Chatman has suggested her pain is abdominal in nature he is given her Skelaxin and Celebrex.  We spent considerable time talking about the potential etiologies in view of her extensive work-up.       The following portions of the patient's history were reviewed and updated as appropriate: allergies, current medications, past family history, past medical history, past social history, past surgical history and problem list.    Review of Systems   Constitutional: Negative for activity change, appetite change, fatigue, fever, unexpected weight gain and unexpected weight loss.   HENT: Negative for swollen glands, trouble swallowing and voice change.    Eyes: Negative for blurred vision and visual disturbance.   Respiratory: Negative for cough and shortness of breath.    Cardiovascular: Negative for chest pain, palpitations and leg swelling.   Gastrointestinal: Negative for abdominal pain, constipation, diarrhea, nausea, vomiting and indigestion.   Endocrine: Negative for cold intolerance, heat intolerance, polydipsia and polyphagia.   Genitourinary: Negative for dysuria and frequency.   Musculoskeletal: Negative for arthralgias, back pain, joint swelling and neck pain.   Skin: Negative for color change, rash and skin lesions.   Neurological: Negative for dizziness, weakness, headache, memory problem and confusion.   Hematological: Does not bruise/bleed easily.   Psychiatric/Behavioral: Negative for  agitation, hallucinations and suicidal ideas. The patient is not nervous/anxious.        Objective   Past Medical History:   Diagnosis Date   • ASCUS with positive high risk HPV    • Dysmenorrhea    • Endometrial thickening on ultra sound    • Follow-up examination after gynecological surgery    • Hypertension    • Menopausal symptoms    • Menorrhagia    • Pelvic pain    • Vaginitis       Past Surgical History:   Procedure Laterality Date   • APPENDECTOMY N/A 11/14/2019    Procedure: APPENDECTOMY LAPAROSCOPIC POSSIBLE OPEN;  Surgeon: Angeles Gallagher MD;  Location: [facility] OR;  Service: General   • CHOLECYSTECTOMY     • COLONOSCOPY N/A 2/20/2020    Procedure: COLONOSCOPY WITH ANESTHESIA;  Surgeon: Thaddeus Meraz DO;  Location: [facility] ENDOSCOPY;  Service: Gastroenterology;  Laterality: N/A;  preop; abdominal pain  postop; diverticulosis   PCP Jason Cuello    • DILATATION AND CURETTAGE      of uterus        Current Outpatient Medications:   •  celecoxib (CELEBREX) 100 MG capsule, Take 2 capsules by mouth Daily., Disp: 30 capsule, Rfl: 2  •  lisinopril (PRINIVIL,ZESTRIL) 40 MG tablet, , Disp: , Rfl: 3  •  metaxalone (SKELAXIN) 800 MG tablet, Take 1 tablet by mouth 3 (Three) Times a Day As Needed for Muscle Spasms., Disp: 60 tablet, Rfl: 3  •  pravastatin (PRAVACHOL) 10 MG tablet, Take 10 mg by mouth daily., Disp: , Rfl:   •  traZODone (DESYREL) 100 MG tablet, Take 100 mg by mouth Daily., Disp: , Rfl:   •  ZOLPIDEM TARTRATE PO, Take  by mouth. 10mg tablet, Disp: , Rfl:      Vitals:    03/04/20 1351   BP: 126/66   Pulse: 75   SpO2: 98%         03/04/20  1351   Weight: 59.4 kg (131 lb)     Patient's Body mass index is 21.14 kg/m². BMI is within normal parameters. No follow-up required..      Physical Exam   Constitutional: She is oriented to person, place, and time. She appears well-developed and well-nourished.   HENT:   Head: Normocephalic and atraumatic.   Right Ear: External ear normal.   Left Ear: External ear  normal.   Nose: Nose normal.   Mouth/Throat: Oropharynx is clear and moist.   Eyes: Pupils are equal, round, and reactive to light. Conjunctivae and EOM are normal.   Neck: Normal range of motion. Neck supple. No thyromegaly present.   Cardiovascular: Normal rate, regular rhythm, normal heart sounds and intact distal pulses.   Pulmonary/Chest: Effort normal and breath sounds normal.   Abdominal: Soft. Bowel sounds are normal.   Lymphadenopathy:     She has no cervical adenopathy.   Neurological: She is alert and oriented to person, place, and time.   Skin: Skin is warm and dry.   Psychiatric: She has a normal mood and affect. Her behavior is normal. Thought content normal.   Nursing note and vitals reviewed.            Assessment/Plan   Diagnoses and all orders for this visit:    1. Lower abdominal pain (Primary)    2. Pelvic pain      At this point I think no further intervention testing is warranted I agree with Dr. Beltrán that using muscle relaxants and anti-inflammatories is a good conservative course she understands there may be something else going on that CAT scans ultrasounds and our examinations have not uncovered.  She is willing to continue with conservative management for the time she understands that she should come back in her see Dr. Beltrán for problems worsen.

## 2020-03-06 ENCOUNTER — CLINICAL SUPPORT (OUTPATIENT)
Dept: INTERNAL MEDICINE | Facility: CLINIC | Age: 60
End: 2020-03-06

## 2020-03-06 DIAGNOSIS — R31.9 URINARY TRACT INFECTION WITH HEMATURIA, SITE UNSPECIFIED: Primary | ICD-10-CM

## 2020-03-06 DIAGNOSIS — N39.0 URINARY TRACT INFECTION WITH HEMATURIA, SITE UNSPECIFIED: Primary | ICD-10-CM

## 2020-03-06 LAB
BILIRUB BLD-MCNC: NEGATIVE MG/DL
CLARITY, POC: CLEAR
COLOR UR: YELLOW
GLUCOSE UR STRIP-MCNC: NEGATIVE MG/DL
KETONES UR QL: NEGATIVE
LEUKOCYTE EST, POC: NEGATIVE
NITRITE UR-MCNC: NEGATIVE MG/ML
PH UR: 8 [PH] (ref 5–8)
PROT UR STRIP-MCNC: NEGATIVE MG/DL
RBC # UR STRIP: ABNORMAL /UL
SP GR UR: 1.01 (ref 1–1.03)
UROBILINOGEN UR QL: NORMAL

## 2020-03-06 PROCEDURE — 99211 OFF/OP EST MAY X REQ PHY/QHP: CPT | Performed by: INTERNAL MEDICINE

## 2020-03-06 PROCEDURE — 81003 URINALYSIS AUTO W/O SCOPE: CPT | Performed by: INTERNAL MEDICINE

## 2020-03-06 NOTE — PROGRESS NOTES
Patient presents today, spoke with Dr. Cuello at office a couple of days ago about leaving urine sample to test for blood. Patient is not having symptoms. Patient urine does show trace blood so we are going to send for culture.

## 2020-03-08 LAB
BACTERIA UR CULT: NORMAL
BACTERIA UR CULT: NORMAL

## 2020-06-22 RX ORDER — PRAVASTATIN SODIUM 10 MG
TABLET ORAL
Qty: 90 TABLET | Refills: 3 | Status: SHIPPED | OUTPATIENT
Start: 2020-06-22 | End: 2021-06-22

## 2020-06-22 RX ORDER — TRAZODONE HYDROCHLORIDE 100 MG/1
TABLET ORAL
Qty: 90 TABLET | Refills: 1 | Status: SHIPPED | OUTPATIENT
Start: 2020-06-22 | End: 2020-12-04

## 2020-06-22 RX ORDER — LISINOPRIL 40 MG/1
TABLET ORAL
Qty: 90 TABLET | Refills: 3 | Status: SHIPPED | OUTPATIENT
Start: 2020-06-22 | End: 2021-06-22

## 2020-06-23 DIAGNOSIS — G47.00 INSOMNIA, UNSPECIFIED TYPE: Primary | ICD-10-CM

## 2020-06-23 RX ORDER — ZOLPIDEM TARTRATE 10 MG/1
TABLET ORAL
Qty: 90 TABLET | Refills: 1 | Status: SHIPPED | OUTPATIENT
Start: 2020-06-23 | End: 2020-12-22

## 2020-09-10 ENCOUNTER — OFFICE VISIT (OUTPATIENT)
Dept: INTERNAL MEDICINE | Facility: CLINIC | Age: 60
End: 2020-09-10

## 2020-09-10 VITALS
TEMPERATURE: 98.2 F | BODY MASS INDEX: 20.09 KG/M2 | SYSTOLIC BLOOD PRESSURE: 100 MMHG | HEIGHT: 67 IN | WEIGHT: 128 LBS | OXYGEN SATURATION: 99 % | DIASTOLIC BLOOD PRESSURE: 70 MMHG | HEART RATE: 91 BPM

## 2020-09-10 DIAGNOSIS — I10 ESSENTIAL HYPERTENSION: ICD-10-CM

## 2020-09-10 DIAGNOSIS — R73.01 IFG (IMPAIRED FASTING GLUCOSE): Primary | ICD-10-CM

## 2020-09-10 LAB — HBA1C MFR BLD: 5.7 %

## 2020-09-10 PROCEDURE — 83036 HEMOGLOBIN GLYCOSYLATED A1C: CPT | Performed by: INTERNAL MEDICINE

## 2020-09-10 PROCEDURE — 99213 OFFICE O/P EST LOW 20 MIN: CPT | Performed by: INTERNAL MEDICINE

## 2020-09-10 NOTE — PROGRESS NOTES
Subjective   Elizabeth Carrillo is a 60 y.o. female.   Chief Complaint   Patient presents with   • Abdominal Pain     6 month follow up   • Prediabetes     a1c: 5.7       Patient is here to have her blood pressure rechecked she also has impaired fasting glucose.  Unfortunately she continues to smoke we have had many discussions about that.       The following portions of the patient's history were reviewed and updated as appropriate: allergies, current medications, past family history, past medical history, past social history, past surgical history and problem list.    Review of Systems   Constitutional: Negative for activity change, appetite change, fatigue, fever, unexpected weight gain and unexpected weight loss.   HENT: Negative for swollen glands, trouble swallowing and voice change.    Eyes: Negative for blurred vision and visual disturbance.   Respiratory: Negative for cough and shortness of breath.    Cardiovascular: Negative for chest pain, palpitations and leg swelling.   Gastrointestinal: Negative for abdominal pain, constipation, diarrhea, nausea, vomiting and indigestion.   Endocrine: Negative for cold intolerance, heat intolerance, polydipsia and polyphagia.   Genitourinary: Negative for dysuria and frequency.   Musculoskeletal: Negative for arthralgias, back pain, joint swelling and neck pain.   Skin: Negative for color change, rash and skin lesions.   Neurological: Negative for dizziness, weakness, headache, memory problem and confusion.   Hematological: Does not bruise/bleed easily.   Psychiatric/Behavioral: Negative for agitation, hallucinations and suicidal ideas. The patient is not nervous/anxious.        Objective   Past Medical History:   Diagnosis Date   • ASCUS with positive high risk HPV    • Dysmenorrhea    • Endometrial thickening on ultra sound    • Follow-up examination after gynecological surgery    • Menopausal symptoms    • Menorrhagia    • Pelvic pain    • Vaginitis       Past Surgical  History:   Procedure Laterality Date   • APPENDECTOMY N/A 11/14/2019    Procedure: APPENDECTOMY LAPAROSCOPIC POSSIBLE OPEN;  Surgeon: Angeles Gallagher MD;  Location: [facility] OR;  Service: General   • CHOLECYSTECTOMY     • COLONOSCOPY N/A 2/20/2020    Procedure: COLONOSCOPY WITH ANESTHESIA;  Surgeon: Thaddeus Meraz DO;  Location: [facility] ENDOSCOPY;  Service: Gastroenterology;  Laterality: N/A;  preop; abdominal pain  postop; diverticulosis   PCP Jason Cuello    • DILATATION AND CURETTAGE      of uterus        Current Outpatient Medications:   •  lisinopril (PRINIVIL,ZESTRIL) 40 MG tablet, TAKE 1 TABLET BY MOUTH DAILY, Disp: 90 tablet, Rfl: 3  •  metaxalone (SKELAXIN) 800 MG tablet, Take 1 tablet by mouth 3 (Three) Times a Day As Needed for Muscle Spasms., Disp: 60 tablet, Rfl: 3  •  pravastatin (PRAVACHOL) 10 MG tablet, TAKE ONE TABLET BY MOUTH EVERY DAY, Disp: 90 tablet, Rfl: 3  •  traZODone (DESYREL) 100 MG tablet, TAKE ONE TABLET BY MOUTH EVERY BEDTIME AS NEEDED **MAY MAKE DROWSY**, Disp: 90 tablet, Rfl: 1  •  zolpidem (AMBIEN) 10 MG tablet, TAKE ONE TABLET BY MOUTH EVERY BEDTIME AS NEEDED, Disp: 90 tablet, Rfl: 1  •  celecoxib (CELEBREX) 100 MG capsule, Take 2 capsules by mouth Daily., Disp: 30 capsule, Rfl: 2     Vitals:    09/10/20 1346   BP: 100/70   Pulse: 91   Temp: 98.2 °F (36.8 °C)   SpO2: 99%         09/10/20  1346   Weight: 58.1 kg (128 lb)     Patient's Body mass index is 20.05 kg/m². BMI is .      Physical Exam   Constitutional: She is oriented to person, place, and time. She appears well-developed and well-nourished.   HENT:   Head: Normocephalic and atraumatic.   Right Ear: External ear normal.   Left Ear: External ear normal.   Nose: Nose normal.   Mouth/Throat: Oropharynx is clear and moist.   Eyes: Pupils are equal, round, and reactive to light. Conjunctivae and EOM are normal.   Neck: Normal range of motion. Neck supple. No thyromegaly present.   Cardiovascular: Normal rate, regular rhythm,  normal heart sounds and intact distal pulses.   Pulmonary/Chest: Effort normal and breath sounds normal.   Abdominal: Soft. Bowel sounds are normal.   Lymphadenopathy:     She has no cervical adenopathy.   Neurological: She is alert and oriented to person, place, and time.   Skin: Skin is warm and dry.   Psychiatric: She has a normal mood and affect. Her behavior is normal. Thought content normal.   Nursing note and vitals reviewed.            Assessment/Plan   Diagnoses and all orders for this visit:    1. IFG (impaired fasting glucose) (Primary)  -     POC Glycosylated Hemoglobin (Hb A1C)    2. Essential hypertension    Patient has impaired fasting glucose A1c is still in the prediabetic category.  Blood pressure is excellent she is taking her medication as prescribed.  No change in medical program other than continue to encourage patient to stop smoking

## 2020-10-22 ENCOUNTER — TELEPHONE (OUTPATIENT)
Dept: OBSTETRICS AND GYNECOLOGY | Facility: CLINIC | Age: 60
End: 2020-10-22

## 2020-12-04 RX ORDER — TRAZODONE HYDROCHLORIDE 100 MG/1
TABLET ORAL
Qty: 90 TABLET | Refills: 1 | Status: SHIPPED | OUTPATIENT
Start: 2020-12-04 | End: 2021-03-22

## 2020-12-22 DIAGNOSIS — G47.00 INSOMNIA, UNSPECIFIED TYPE: ICD-10-CM

## 2020-12-22 RX ORDER — ZOLPIDEM TARTRATE 10 MG/1
TABLET ORAL
Qty: 90 TABLET | Refills: 1 | Status: SHIPPED | OUTPATIENT
Start: 2020-12-22 | End: 2021-06-21

## 2021-03-11 ENCOUNTER — OFFICE VISIT (OUTPATIENT)
Dept: INTERNAL MEDICINE | Facility: CLINIC | Age: 61
End: 2021-03-11

## 2021-03-11 VITALS
RESPIRATION RATE: 16 BRPM | OXYGEN SATURATION: 98 % | WEIGHT: 136.6 LBS | DIASTOLIC BLOOD PRESSURE: 78 MMHG | HEIGHT: 67 IN | TEMPERATURE: 98.4 F | BODY MASS INDEX: 21.44 KG/M2 | HEART RATE: 101 BPM | SYSTOLIC BLOOD PRESSURE: 124 MMHG

## 2021-03-11 DIAGNOSIS — Z00.00 WELLNESS EXAMINATION: ICD-10-CM

## 2021-03-11 DIAGNOSIS — Z12.31 ENCOUNTER FOR SCREENING MAMMOGRAM FOR BREAST CANCER: Primary | ICD-10-CM

## 2021-03-11 DIAGNOSIS — F17.200 SMOKING: ICD-10-CM

## 2021-03-11 DIAGNOSIS — Z78.0 POSTMENOPAUSAL: ICD-10-CM

## 2021-03-11 DIAGNOSIS — Z11.59 NEED FOR HEPATITIS C SCREENING TEST: ICD-10-CM

## 2021-03-11 DIAGNOSIS — E11.9 ENCOUNTER FOR DIABETIC FOOT EXAM (HCC): ICD-10-CM

## 2021-03-11 LAB — HBA1C MFR BLD: 5.8 %

## 2021-03-11 PROCEDURE — 99396 PREV VISIT EST AGE 40-64: CPT | Performed by: INTERNAL MEDICINE

## 2021-03-11 PROCEDURE — 83036 HEMOGLOBIN GLYCOSYLATED A1C: CPT | Performed by: INTERNAL MEDICINE

## 2021-03-11 NOTE — PROGRESS NOTES
Chief Complaint   Patient presents with   • Annual Exam     a1c:5.8         History:  Elizabeth Carrillo is a 61 y.o. female who presents today for evaluation of the above problems.  Patient is here for annual wellness evaluation her A1c is 5.8 she is not having any particular new problems.        ROS:  Review of Systems   Constitutional: Negative for activity change, appetite change, fatigue, fever, unexpected weight gain and unexpected weight loss.   HENT: Negative for swollen glands, trouble swallowing and voice change.    Eyes: Negative for blurred vision and visual disturbance.   Respiratory: Negative for cough and shortness of breath.    Cardiovascular: Negative for chest pain, palpitations and leg swelling.   Gastrointestinal: Negative for abdominal pain, constipation, diarrhea, nausea, vomiting and indigestion.   Endocrine: Negative for cold intolerance, heat intolerance, polydipsia and polyphagia.   Genitourinary: Negative for dysuria and frequency.   Musculoskeletal: Negative for arthralgias, back pain, joint swelling and neck pain.   Skin: Negative for color change, rash and skin lesions.   Neurological: Negative for dizziness, weakness, headache, memory problem and confusion.   Hematological: Does not bruise/bleed easily.   Psychiatric/Behavioral: Negative for agitation, hallucinations and suicidal ideas. The patient is not nervous/anxious.        Allergies   Allergen Reactions   • Codeine Nausea And Vomiting     Past Medical History:   Diagnosis Date   • ASCUS with positive high risk HPV    • Dysmenorrhea    • Endometrial thickening on ultra sound    • Follow-up examination after gynecological surgery    • Menopausal symptoms    • Menorrhagia    • Pelvic pain    • Vaginitis      Past Surgical History:   Procedure Laterality Date   • APPENDECTOMY N/A 11/14/2019    Procedure: APPENDECTOMY LAPAROSCOPIC POSSIBLE OPEN;  Surgeon: Angeles Gallagher MD;  Location: [facility];  Service: General   • CHOLECYSTECTOMY    "  • COLONOSCOPY N/A 2/20/2020    Procedure: COLONOSCOPY WITH ANESTHESIA;  Surgeon: Thaddeus Meraz DO;  Location: [facility] ENDOSCOPY;  Service: Gastroenterology;  Laterality: N/A;  preop; abdominal pain  postop; diverticulosis   PCP Jason Cuello    • DILATATION AND CURETTAGE      of uterus     Family History   Problem Relation Age of Onset   • Diabetes Father    • Diabetes Mother    • Colon cancer Neg Hx    • Colon polyps Neg Hx      The patient  reports that she has been smoking cigarettes. She has a 40.00 pack-year smoking history. She has never used smokeless tobacco. She reports current alcohol use. She reports that she does not use drugs.    There is no immunization history on file for this patient.       Current Outpatient Medications:   •  lisinopril (PRINIVIL,ZESTRIL) 40 MG tablet, TAKE 1 TABLET BY MOUTH DAILY, Disp: 90 tablet, Rfl: 3  •  pravastatin (PRAVACHOL) 10 MG tablet, TAKE ONE TABLET BY MOUTH EVERY DAY, Disp: 90 tablet, Rfl: 3  •  traZODone (DESYREL) 100 MG tablet, TAKE ONE TABLET BY MOUTH EVERY BEDTIME AS NEEDED **MAY MAKE DROWSY**, Disp: 90 tablet, Rfl: 1  •  zolpidem (AMBIEN) 10 MG tablet, TAKE ONE TABLET BY MOUTH EVERY BEDTIME AS NEEDED, Disp: 90 tablet, Rfl: 1    OBJECTIVE:  /78 (BP Location: Left arm, Patient Position: Sitting, Cuff Size: Adult)   Pulse 101   Temp 98.4 °F (36.9 °C) (Temporal)   Resp 16   Ht 170.2 cm (67\")   Wt 62 kg (136 lb 9.6 oz)   SpO2 98%   Breastfeeding No   BMI 21.39 kg/m²    Physical Exam  Constitutional:       Appearance: Normal appearance. She is well-developed.   HENT:      Head: Normocephalic and atraumatic.      Right Ear: External ear normal.      Left Ear: External ear normal.   Eyes:      Extraocular Movements: Extraocular movements intact.      Conjunctiva/sclera: Conjunctivae normal.      Pupils: Pupils are equal, round, and reactive to light.   Neck:      Vascular: No carotid bruit.   Cardiovascular:      Rate and Rhythm: Normal rate and " regular rhythm.      Pulses: Normal pulses.      Heart sounds: Normal heart sounds. No murmur. No gallop.    Pulmonary:      Effort: Pulmonary effort is normal.      Breath sounds: Normal breath sounds.   Musculoskeletal:         General: No swelling or tenderness. Normal range of motion.      Cervical back: Normal range of motion and neck supple. No rigidity.   Skin:     General: Skin is warm and dry.   Neurological:      General: No focal deficit present.      Mental Status: She is alert and oriented to person, place, and time.   Psychiatric:         Mood and Affect: Mood normal.         Behavior: Behavior normal.         The patient was counseled regarding nutrition, physical activity, healthy weight, misuse of tobacco, immunizations and screenings.  Health Maintenance:        Assessment/Plan     Diagnoses and all orders for this visit:    1. Need for hepatitis C screening test  -     Hepatitis C Antibody    2. Wellness examination  -     CBC & Differential  -     Comprehensive Metabolic Panel  -     Urinalysis With Microscopic - Urine, Clean Catch  -     TSH  -     Lipid Panel  -     Uric Acid    3. Encounter for diabetic foot exam (CMS/HCC)  -     POC Glycosylated Hemoglobin (Hb A1C)      Patient has well-controlled blood pressure she is continue to take her pravastatin we are doing laboratory today to reassess.  Prescriptions for chest x-ray because she is a smoker as well as mammogram and bone density studies were provided patient is reluctant to have any screening evaluation she understands the risk for lung cancer breast cancer.     An After Visit Summary was printed and given to the patient at discharge.  No follow-ups on file.         Jason Cuello MD  3/11/2021   Electronically signed.

## 2021-03-12 LAB
ALBUMIN SERPL-MCNC: 4.8 G/DL (ref 3.5–5.2)
ALBUMIN/GLOB SERPL: 2.1 G/DL
ALP SERPL-CCNC: 85 U/L (ref 39–117)
ALT SERPL-CCNC: 17 U/L (ref 1–33)
APPEARANCE UR: CLEAR
AST SERPL-CCNC: 21 U/L (ref 1–32)
BACTERIA #/AREA URNS HPF: ABNORMAL /HPF
BASOPHILS # BLD AUTO: 0.05 10*3/MM3 (ref 0–0.2)
BASOPHILS NFR BLD AUTO: 0.4 % (ref 0–1.5)
BILIRUB SERPL-MCNC: 0.4 MG/DL (ref 0–1.2)
BILIRUB UR QL STRIP: NEGATIVE
BUN SERPL-MCNC: 10 MG/DL (ref 8–23)
BUN/CREAT SERPL: 14.9 (ref 7–25)
CALCIUM SERPL-MCNC: 9.9 MG/DL (ref 8.6–10.5)
CASTS URNS MICRO: ABNORMAL
CHLORIDE SERPL-SCNC: 98 MMOL/L (ref 98–107)
CHOLEST SERPL-MCNC: 173 MG/DL (ref 0–200)
CO2 SERPL-SCNC: 29.2 MMOL/L (ref 22–29)
COLOR UR: YELLOW
CREAT SERPL-MCNC: 0.67 MG/DL (ref 0.57–1)
EOSINOPHIL # BLD AUTO: 0.11 10*3/MM3 (ref 0–0.4)
EOSINOPHIL NFR BLD AUTO: 0.8 % (ref 0.3–6.2)
EPI CELLS #/AREA URNS HPF: ABNORMAL /HPF
ERYTHROCYTE [DISTWIDTH] IN BLOOD BY AUTOMATED COUNT: 13 % (ref 12.3–15.4)
GLOBULIN SER CALC-MCNC: 2.3 GM/DL
GLUCOSE SERPL-MCNC: 87 MG/DL (ref 65–99)
GLUCOSE UR QL: NEGATIVE
HCT VFR BLD AUTO: 38.4 % (ref 34–46.6)
HCV AB S/CO SERPL IA: <0.1 S/CO RATIO (ref 0–0.9)
HDLC SERPL-MCNC: 60 MG/DL (ref 40–60)
HGB BLD-MCNC: 12.7 G/DL (ref 12–15.9)
HGB UR QL STRIP: ABNORMAL
IMM GRANULOCYTES # BLD AUTO: 0.05 10*3/MM3 (ref 0–0.05)
IMM GRANULOCYTES NFR BLD AUTO: 0.4 % (ref 0–0.5)
KETONES UR QL STRIP: NEGATIVE
LDLC SERPL CALC-MCNC: 98 MG/DL (ref 0–100)
LEUKOCYTE ESTERASE UR QL STRIP: NEGATIVE
LYMPHOCYTES # BLD AUTO: 2.56 10*3/MM3 (ref 0.7–3.1)
LYMPHOCYTES NFR BLD AUTO: 18.7 % (ref 19.6–45.3)
MCH RBC QN AUTO: 32 PG (ref 26.6–33)
MCHC RBC AUTO-ENTMCNC: 33.1 G/DL (ref 31.5–35.7)
MCV RBC AUTO: 96.7 FL (ref 79–97)
MONOCYTES # BLD AUTO: 0.66 10*3/MM3 (ref 0.1–0.9)
MONOCYTES NFR BLD AUTO: 4.8 % (ref 5–12)
NEUTROPHILS # BLD AUTO: 10.23 10*3/MM3 (ref 1.7–7)
NEUTROPHILS NFR BLD AUTO: 74.9 % (ref 42.7–76)
NITRITE UR QL STRIP: NEGATIVE
NRBC BLD AUTO-RTO: 0 /100 WBC (ref 0–0.2)
PH UR STRIP: 5.5 [PH] (ref 5–8)
PLATELET # BLD AUTO: 344 10*3/MM3 (ref 140–450)
POTASSIUM SERPL-SCNC: 4.6 MMOL/L (ref 3.5–5.2)
PROT SERPL-MCNC: 7.1 G/DL (ref 6–8.5)
PROT UR QL STRIP: NEGATIVE
RBC # BLD AUTO: 3.97 10*6/MM3 (ref 3.77–5.28)
RBC #/AREA URNS HPF: ABNORMAL /HPF
SODIUM SERPL-SCNC: 138 MMOL/L (ref 136–145)
SP GR UR: 1.02 (ref 1–1.03)
TRIGL SERPL-MCNC: 83 MG/DL (ref 0–150)
TSH SERPL DL<=0.005 MIU/L-ACNC: 1.9 UIU/ML (ref 0.27–4.2)
URATE SERPL-MCNC: 3.8 MG/DL (ref 2.4–5.7)
UROBILINOGEN UR STRIP-MCNC: ABNORMAL MG/DL
VLDLC SERPL CALC-MCNC: 15 MG/DL (ref 5–40)
WBC # BLD AUTO: 13.66 10*3/MM3 (ref 3.4–10.8)
WBC #/AREA URNS HPF: ABNORMAL /HPF

## 2021-03-15 ENCOUNTER — TELEPHONE (OUTPATIENT)
Dept: INTERNAL MEDICINE | Facility: CLINIC | Age: 61
End: 2021-03-15

## 2021-03-15 NOTE — TELEPHONE ENCOUNTER
----- Message from Jason Cuello MD sent at 3/15/2021  7:58 AM CDT -----  Okay to call inform her that her Covid antibody was positive representing prior infection or immunization

## 2021-03-15 NOTE — TELEPHONE ENCOUNTER
03/11/2021 labs      Spoke with pt gave lab results and informed her pf blood in urine.  Pt would like to wait before rechecking urine and will contact us if she changes her mind.

## 2021-03-15 NOTE — TELEPHONE ENCOUNTER
----- Message from Jason Cuello MD sent at 3/15/2021  7:52 AM CDT -----  Results are stable with the exception of more blood in her urine.  I know she had had a large work-up in the last couple of years for pain in the bladder area.  If she is agreeable to pursue the hematuria I would first of all recheck the urine and if is persistent then she would need a CT and a urology evaluation.

## 2021-03-22 RX ORDER — TRAZODONE HYDROCHLORIDE 100 MG/1
TABLET ORAL
Qty: 90 TABLET | Refills: 3 | Status: SHIPPED | OUTPATIENT
Start: 2021-03-22 | End: 2022-03-10

## 2021-04-29 NOTE — PLAN OF CARE
Problem: Patient Care Overview  Goal: Plan of Care Review  Outcome: Ongoing (interventions implemented as appropriate)   11/15/19 0357   Coping/Psychosocial   Plan of Care Reviewed With patient   Plan of Care Review   Progress no change   OTHER   Outcome Summary Pt admitted from PACU s/p appendectomy. Lap x 3 g/t. PRN pain meds available. IV abx adminstered as ordered. Continue to monitor      Goal: Individualization and Mutuality  Outcome: Ongoing (interventions implemented as appropriate)    Goal: Discharge Needs Assessment  Outcome: Ongoing (interventions implemented as appropriate)      Problem: Appendectomy (Adult)  Goal: Signs and Symptoms of Listed Potential Problems Will be Absent, Minimized or Managed (Appendectomy)  Outcome: Ongoing (interventions implemented as appropriate)         Skyrizi Counseling: I discussed with the patient the risks of risankizumab-rzaa including but not limited to immunosuppression, and serious infections.  The patient understands that monitoring is required including a PPD at baseline and must alert us or the primary physician if symptoms of infection or other concerning signs are noted.

## 2021-05-10 ENCOUNTER — TELEPHONE (OUTPATIENT)
Dept: INTERNAL MEDICINE | Facility: CLINIC | Age: 61
End: 2021-05-10

## 2021-05-10 ENCOUNTER — OFFICE VISIT (OUTPATIENT)
Dept: INTERNAL MEDICINE | Facility: CLINIC | Age: 61
End: 2021-05-10

## 2021-05-10 DIAGNOSIS — J06.9 UPPER RESPIRATORY TRACT INFECTION, UNSPECIFIED TYPE: Primary | ICD-10-CM

## 2021-05-10 PROCEDURE — 99442 PR PHYS/QHP TELEPHONE EVALUATION 11-20 MIN: CPT | Performed by: NURSE PRACTITIONER

## 2021-05-10 RX ORDER — AMOXICILLIN AND CLAVULANATE POTASSIUM 875; 125 MG/1; MG/1
1 TABLET, FILM COATED ORAL 2 TIMES DAILY
Qty: 20 TABLET | Refills: 0 | Status: SHIPPED | OUTPATIENT
Start: 2021-05-10 | End: 2021-05-20

## 2021-05-10 RX ORDER — FLUTICASONE PROPIONATE 50 MCG
2 SPRAY, SUSPENSION (ML) NASAL DAILY
Qty: 9.9 ML | Refills: 0 | Status: SHIPPED | OUTPATIENT
Start: 2021-05-10 | End: 2021-06-21

## 2021-05-10 NOTE — PROGRESS NOTES
Subjective   Elizabeth Carrillo is a 61 y.o. female.   Chief Complaint   Patient presents with   • Illness     Head cold, nasal congestion-green mucus, sore throat x 3 days   You have chosen to receive care through a telephone visit. Do you consent to use a telephone visit for your medical care today? Yes      Elizabeth is present via telephone visit today for complaints of URI symptoms. She was around her grandchildren who attend  and have been congested.  She has a history of bronchitis and has concerns of her symptoms going into her chest.  No fever, no cough, no shortness of breath.     URI   This is a new problem. The current episode started in the past 7 days (4 days). The problem has been gradually worsening. There has been no fever. Associated symptoms include congestion (green) and ear pain (mild; left side). Pertinent negatives include no abdominal pain, chest pain, coughing, diarrhea, dysuria, nausea, neck pain, rash, swollen glands or vomiting. Treatments tried: Zyrtec; Jina-seltzer plus sinus. The treatment provided moderate relief.        The following portions of the patient's history were reviewed and updated as appropriate: allergies, current medications, past family history, past medical history, past social history, past surgical history and problem list.    Review of Systems   Constitutional: Negative for activity change, appetite change, fatigue, fever, unexpected weight gain and unexpected weight loss.   HENT: Positive for congestion (green), ear pain (mild; left side), postnasal drip and sinus pressure. Negative for swollen glands, trouble swallowing and voice change.    Eyes: Negative for blurred vision and visual disturbance.   Respiratory: Negative for cough and shortness of breath.    Cardiovascular: Negative for chest pain, palpitations and leg swelling.   Gastrointestinal: Negative for abdominal pain, constipation, diarrhea, nausea, vomiting and indigestion.   Endocrine: Negative for cold  intolerance, heat intolerance, polydipsia and polyphagia.   Genitourinary: Negative for dysuria and frequency.   Musculoskeletal: Negative for arthralgias, back pain, joint swelling and neck pain.   Skin: Negative for color change, rash and skin lesions.   Neurological: Negative for dizziness, weakness, headache, memory problem and confusion.   Hematological: Does not bruise/bleed easily.   Psychiatric/Behavioral: Negative for agitation, hallucinations and suicidal ideas. The patient is not nervous/anxious.        Objective   Past Medical History:   Diagnosis Date   • ASCUS with positive high risk HPV    • Dysmenorrhea    • Endometrial thickening on ultra sound    • Follow-up examination after gynecological surgery    • Menopausal symptoms    • Menorrhagia    • Pelvic pain    • Vaginitis       Past Surgical History:   Procedure Laterality Date   • APPENDECTOMY N/A 11/14/2019    Procedure: APPENDECTOMY LAPAROSCOPIC POSSIBLE OPEN;  Surgeon: Angeles Gallagher MD;  Location[facility] OR;  Service: General   • CHOLECYSTECTOMY     • COLONOSCOPY N/A 2/20/2020    Procedure: COLONOSCOPY WITH ANESTHESIA;  Surgeon: Thaddeus Meraz DO;  Location: [facility] ENDOSCOPY;  Service: Gastroenterology;  Laterality: N/A;  preop; abdominal pain  postop; diverticulosis   PCP Jason Cuello    • DILATATION AND CURETTAGE      of uterus        Current Outpatient Medications:   •  lisinopril (PRINIVIL,ZESTRIL) 40 MG tablet, TAKE 1 TABLET BY MOUTH DAILY, Disp: 90 tablet, Rfl: 3  •  pravastatin (PRAVACHOL) 10 MG tablet, TAKE ONE TABLET BY MOUTH EVERY DAY, Disp: 90 tablet, Rfl: 3  •  traZODone (DESYREL) 100 MG tablet, TAKE ONE TABLET BY MOUTH EVERY BEDTIME AS NEEDED **MAY MAKE DROWSY**, Disp: 90 tablet, Rfl: 3  •  zolpidem (AMBIEN) 10 MG tablet, TAKE ONE TABLET BY MOUTH EVERY BEDTIME AS NEEDED, Disp: 90 tablet, Rfl: 1  •  amoxicillin-clavulanate (AUGMENTIN) 875-125 MG per tablet, Take 1 tablet by mouth 2 (Two) Times a Day for 10 days., Disp: 20  tablet, Rfl: 0  •  fluticasone (Flonase) 50 MCG/ACT nasal spray, 2 sprays into the nostril(s) as directed by provider Daily., Disp: 9.9 mL, Rfl: 0     There were no vitals filed for this visit.  There were no vitals filed for this visit.      Physical Exam      No physical exam- telephone visit     Assessment/Plan   Diagnoses and all orders for this visit:    1. Upper respiratory tract infection, unspecified type (Primary)  -     fluticasone (Flonase) 50 MCG/ACT nasal spray; 2 sprays into the nostril(s) as directed by provider Daily.  Dispense: 9.9 mL; Refill: 0  -     amoxicillin-clavulanate (AUGMENTIN) 875-125 MG per tablet; Take 1 tablet by mouth 2 (Two) Times a Day for 10 days.  Dispense: 20 tablet; Refill: 0      This visit has been rescheduled as a phone visit to comply with patient safety concerns in accordance with CDC recommendations. Total time of discussion was 11-20 minutes.    I have explained to Elizabeth that she likely has a viral infection and that support therapy is needed at this time.  Advised Flonase, saline nasal spray, and may continue Jina-selter as long as blood pressure is stable on this.  Discussed OTC coricidin HBP if blood pressure does not do well with Jina-[place].  She would like antibiotic today.  I have explained that antibiotics do not cover viral illnesses and it may do nothing for her illness or symptoms.  Advised against overuse of antibiotics.  Recommended she continue managing with supportive therapy for the next 2 days and if no improvement, may start the antibiotic.  If symptoms significantly worsen, will need further evaluation.  Verbalized understanding.

## 2021-05-10 NOTE — TELEPHONE ENCOUNTER
Caller: Elizabeth Carrillo    Relationship: Self    Best call back number: [phone]     What medication are you requesting: ANTIBIOTIC    What are your current symptoms: SINUS PRESSURE, PAIN AND GREEN DRAINAGE     How long have you been experiencing symptoms:   WEEK    Have you had these symptoms before:    [x] Yes  [] No    Have you been treated for these symptoms before:   [x] Yes  [] No    If a prescription is needed, what is your preferred pharmacy and phone number: [facility] - [place], [place] - [facility] [facility] [phone] [facility] [phone]      Additional notes: PLEASE CALL WHEN IT IS SENT IN

## 2021-05-21 ENCOUNTER — OFFICE VISIT (OUTPATIENT)
Dept: INTERNAL MEDICINE | Facility: CLINIC | Age: 61
End: 2021-05-21

## 2021-05-21 ENCOUNTER — TELEPHONE (OUTPATIENT)
Dept: INTERNAL MEDICINE | Facility: CLINIC | Age: 61
End: 2021-05-21

## 2021-05-21 VITALS
SYSTOLIC BLOOD PRESSURE: 112 MMHG | TEMPERATURE: 97.7 F | HEIGHT: 67 IN | RESPIRATION RATE: 18 BRPM | DIASTOLIC BLOOD PRESSURE: 70 MMHG | BODY MASS INDEX: 20.88 KG/M2 | WEIGHT: 133 LBS | HEART RATE: 70 BPM | OXYGEN SATURATION: 94 %

## 2021-05-21 DIAGNOSIS — J20.9 ACUTE BRONCHITIS, UNSPECIFIED ORGANISM: Primary | ICD-10-CM

## 2021-05-21 DIAGNOSIS — J20.9 ACUTE BRONCHITIS, UNSPECIFIED ORGANISM: ICD-10-CM

## 2021-05-21 PROCEDURE — 99213 OFFICE O/P EST LOW 20 MIN: CPT | Performed by: NURSE PRACTITIONER

## 2021-05-21 RX ORDER — METHYLPREDNISOLONE 4 MG/1
TABLET ORAL
Qty: 1 EACH | Refills: 0 | Status: SHIPPED | OUTPATIENT
Start: 2021-05-21 | End: 2021-06-08

## 2021-05-21 RX ORDER — AZITHROMYCIN 250 MG/1
TABLET, FILM COATED ORAL
Qty: 6 TABLET | Refills: 0 | Status: SHIPPED | OUTPATIENT
Start: 2021-05-21 | End: 2021-06-08

## 2021-05-21 RX ORDER — BENZONATATE 200 MG/1
200 CAPSULE ORAL 3 TIMES DAILY PRN
Qty: 15 CAPSULE | Refills: 0 | Status: SHIPPED | OUTPATIENT
Start: 2021-05-21 | End: 2021-06-08

## 2021-05-21 NOTE — PROGRESS NOTES
"Subjective   Elizabeth Carrillo is a 61 y.o. female.   Chief Complaint   Patient presents with   • Cough     Pt still has cough with cloudy mucus.       Elizabeth presents today for complaints of ongoing cough and chest congestion.  She was recently treated for a URI with Augmentin and Flonase which she states helped a lot.  Her head congestion is still present but improved and now with clear mucus.  She states, however, that she continues to cough and has more chest congestion.  She has a history of bronchitis and is a smoker.  When asked how much she is smoking she states, \"I'd take them in the shower with me if I could\".  She denies fever or shortness of breath.  There is no wheezing.     She is leaving for [place] next week and wanting to try and have this cleared by then.  She has had both COVID-19 vaccines and recently tested negative for COVID.    The following portions of the patient's history were reviewed and updated as appropriate: allergies, current medications, past family history, past medical history, past social history, past surgical history and problem list.    Review of Systems   Constitutional: Negative for activity change, appetite change, fatigue, fever, unexpected weight gain and unexpected weight loss.   HENT: Positive for congestion (head congestion clear). Negative for sore throat, swollen glands, trouble swallowing and voice change.    Eyes: Negative for blurred vision and visual disturbance.   Respiratory: Positive for cough (cloudy/yellow-brown mucus). Negative for shortness of breath.    Cardiovascular: Negative for chest pain, palpitations and leg swelling.   Gastrointestinal: Negative for abdominal pain, constipation, diarrhea, nausea, vomiting and indigestion.   Endocrine: Negative for cold intolerance, heat intolerance, polydipsia and polyphagia.   Genitourinary: Negative for dysuria and frequency.   Musculoskeletal: Negative for arthralgias, back pain, joint swelling and neck pain.   Skin: " Negative for color change, rash and skin lesions.   Neurological: Negative for dizziness, weakness, headache, memory problem and confusion.   Hematological: Does not bruise/bleed easily.   Psychiatric/Behavioral: Negative for agitation, hallucinations and suicidal ideas. The patient is not nervous/anxious.        Objective   Past Medical History:   Diagnosis Date   • ASCUS with positive high risk HPV    • Dysmenorrhea    • Endometrial thickening on ultra sound    • Follow-up examination after gynecological surgery    • Menopausal symptoms    • Menorrhagia    • Pelvic pain    • Vaginitis       Past Surgical History:   Procedure Laterality Date   • APPENDECTOMY N/A 11/14/2019    Procedure: APPENDECTOMY LAPAROSCOPIC POSSIBLE OPEN;  Surgeon: Angeles Gallagher MD;  Location: [facility] OR;  Service: General   • CHOLECYSTECTOMY     • COLONOSCOPY N/A 2/20/2020    Procedure: COLONOSCOPY WITH ANESTHESIA;  Surgeon: Thaddeus Meraz DO;  Location: [facility] ENDOSCOPY;  Service: Gastroenterology;  Laterality: N/A;  preop; abdominal pain  postop; diverticulosis   PCP Jason Cuello    • DILATATION AND CURETTAGE      of uterus        Current Outpatient Medications:   •  fluticasone (Flonase) 50 MCG/ACT nasal spray, 2 sprays into the nostril(s) as directed by provider Daily., Disp: 9.9 mL, Rfl: 0  •  lisinopril (PRINIVIL,ZESTRIL) 40 MG tablet, TAKE 1 TABLET BY MOUTH DAILY, Disp: 90 tablet, Rfl: 3  •  pravastatin (PRAVACHOL) 10 MG tablet, TAKE ONE TABLET BY MOUTH EVERY DAY, Disp: 90 tablet, Rfl: 3  •  traZODone (DESYREL) 100 MG tablet, TAKE ONE TABLET BY MOUTH EVERY BEDTIME AS NEEDED **MAY MAKE DROWSY**, Disp: 90 tablet, Rfl: 3  •  zolpidem (AMBIEN) 10 MG tablet, TAKE ONE TABLET BY MOUTH EVERY BEDTIME AS NEEDED, Disp: 90 tablet, Rfl: 1  •  azithromycin (Zithromax Z-Moshe) 250 MG tablet, Take 2 tablets by mouth on day 1, then 1 tablet daily on days 2-5, Disp: 6 tablet, Rfl: 0  •  benzonatate (TESSALON) 200 MG capsule, Take 1 capsule by  mouth 3 (Three) Times a Day As Needed for Cough., Disp: 15 capsule, Rfl: 0  •  methylPREDNISolone (MEDROL) 4 MG dose pack, Take as directed on package instructions., Disp: 1 each, Rfl: 0     Vitals:    05/21/21 0903   BP: 112/70   Pulse: 70   Resp: 18   Temp: 97.7 °F (36.5 °C)   SpO2: 94%         05/21/21 0903   Weight: 60.3 kg (133 lb)         Physical Exam  Constitutional:       General: She is not in acute distress.     Appearance: She is well-developed.   HENT:      Head: Normocephalic.      Right Ear: Tympanic membrane and external ear normal.      Left Ear: Tympanic membrane and external ear normal.      Nose:      Right Sinus: No maxillary sinus tenderness or frontal sinus tenderness.      Left Sinus: No maxillary sinus tenderness or frontal sinus tenderness.      Mouth/Throat:      Mouth: Mucous membranes are moist. No oral lesions.      Pharynx: Oropharynx is clear. Posterior oropharyngeal erythema (mild; streaky appearance) present. No oropharyngeal exudate.   Eyes:      Conjunctiva/sclera: Conjunctivae normal.   Cardiovascular:      Rate and Rhythm: Normal rate and regular rhythm.      Pulses: Normal pulses.      Heart sounds: Normal heart sounds.   Pulmonary:      Effort: Pulmonary effort is normal.      Breath sounds: Normal breath sounds.      Comments: Coarse breath sounds; mildly decreased to the RLL  Skin:     General: Skin is warm and dry.   Neurological:      Mental Status: She is alert and oriented to person, place, and time.      Gait: Gait normal.   Psychiatric:         Mood and Affect: Mood normal.         Speech: Speech normal.         Behavior: Behavior normal.         Thought Content: Thought content normal.               Assessment/Plan   Diagnoses and all orders for this visit:    1. Acute bronchitis, unspecified organism (Primary)  -     XR Chest PA & Lateral; Future  -     azithromycin (Zithromax Z-Moshe) 250 MG tablet; Take 2 tablets by mouth on day 1, then 1 tablet daily on days 2-5   Dispense: 6 tablet; Refill: 0  -     methylPREDNISolone (MEDROL) 4 MG dose pack; Take as directed on package instructions.  Dispense: 1 each; Refill: 0  -     benzonatate (TESSALON) 200 MG capsule; Take 1 capsule by mouth 3 (Three) Times a Day As Needed for Cough.  Dispense: 15 capsule; Refill: 0    Advised plain Mucinex twice daily to help break up mucus in the chest.  Avoid additional cough suppressant if using Tessalon.  Avoid over-suppression of the cough and continue to take deep breaths and promote cough to keep lungs clear.  Increase water intake and avoid smoking.    Order was given for a chest x-ray.  She plans to try to have this done today but otherwise will get next week.    If there is no improvement or symptoms worsen, will need follow up evaluation.  She verbalized understanding.

## 2021-05-21 NOTE — TELEPHONE ENCOUNTER
----- Message from GIOVANNY Navarrete sent at 5/21/2021 10:43 AM CDT -----  CXR looks good.  No pneumonia.

## 2021-06-08 ENCOUNTER — OFFICE VISIT (OUTPATIENT)
Dept: INTERNAL MEDICINE | Facility: CLINIC | Age: 61
End: 2021-06-08

## 2021-06-08 VITALS
BODY MASS INDEX: 20.4 KG/M2 | DIASTOLIC BLOOD PRESSURE: 62 MMHG | WEIGHT: 130 LBS | TEMPERATURE: 97.7 F | SYSTOLIC BLOOD PRESSURE: 102 MMHG | HEIGHT: 67 IN | OXYGEN SATURATION: 98 % | HEART RATE: 101 BPM

## 2021-06-08 DIAGNOSIS — R39.9 UTI SYMPTOMS: Primary | ICD-10-CM

## 2021-06-08 DIAGNOSIS — N30.01 ACUTE CYSTITIS WITH HEMATURIA: ICD-10-CM

## 2021-06-08 LAB
BILIRUB BLD-MCNC: NEGATIVE MG/DL
CLARITY, POC: CLEAR
COLOR UR: YELLOW
GLUCOSE UR STRIP-MCNC: NEGATIVE MG/DL
KETONES UR QL: NEGATIVE
LEUKOCYTE EST, POC: ABNORMAL
NITRITE UR-MCNC: NEGATIVE MG/ML
PH UR: 5 [PH] (ref 5–8)
PROT UR STRIP-MCNC: ABNORMAL MG/DL
RBC # UR STRIP: ABNORMAL /UL
SP GR UR: 1.03 (ref 1–1.03)
UROBILINOGEN UR QL: NORMAL

## 2021-06-08 PROCEDURE — 99213 OFFICE O/P EST LOW 20 MIN: CPT | Performed by: NURSE PRACTITIONER

## 2021-06-08 PROCEDURE — 81003 URINALYSIS AUTO W/O SCOPE: CPT | Performed by: NURSE PRACTITIONER

## 2021-06-08 RX ORDER — SULFAMETHOXAZOLE AND TRIMETHOPRIM 800; 160 MG/1; MG/1
1 TABLET ORAL 2 TIMES DAILY
Qty: 10 TABLET | Refills: 0 | Status: SHIPPED | OUTPATIENT
Start: 2021-06-08 | End: 2021-09-13

## 2021-06-08 NOTE — PROGRESS NOTES
"Chief Complaint  Urinary Tract Infection (urgency)    Subjective          Elizabeth Carrillo presents to [facility]  Urinary Tract Infection   This is a new problem. The current episode started yesterday. The problem occurs every urination. The problem has been gradually worsening. The pain is at a severity of 4/10. The pain is moderate. There has been no fever. She is not sexually active. There is no history of pyelonephritis. Associated symptoms include frequency, hematuria, hesitancy and urgency. She has tried increased fluids for the symptoms. The treatment provided no relief. There is no history of recurrent UTIs.       Objective   Vital Signs:   /62 (BP Location: Left arm)   Pulse 101   Temp 97.7 °F (36.5 °C)   Ht 170.2 cm (67\")   Wt 59 kg (130 lb)   SpO2 98%   BMI 20.36 kg/m²     Physical Exam  Vitals and nursing note reviewed.   Constitutional:       Appearance: She is well-developed.   HENT:      Head: Normocephalic and atraumatic.      Right Ear: External ear normal.      Left Ear: External ear normal.      Nose: Nose normal.   Eyes:      Conjunctiva/sclera: Conjunctivae normal.      Pupils: Pupils are equal, round, and reactive to light.   Neck:      Thyroid: No thyromegaly.   Cardiovascular:      Rate and Rhythm: Normal rate and regular rhythm.      Heart sounds: Normal heart sounds.   Pulmonary:      Effort: Pulmonary effort is normal.      Breath sounds: Normal breath sounds.   Abdominal:      General: Bowel sounds are normal.      Palpations: Abdomen is soft.   Musculoskeletal:      Cervical back: Normal range of motion and neck supple.   Lymphadenopathy:      Cervical: No cervical adenopathy.   Skin:     General: Skin is warm and dry.   Neurological:      Mental Status: She is alert and oriented to person, place, and time.   Psychiatric:         Behavior: Behavior normal.         Thought Content: Thought content normal.        Result Review :   The following data " was reviewed by: GIOVANNY Fair on 06/08/2021:  UA    Urinalysis 3/11/21 3/11/21 6/8/21    0906 0906    Ketones, UA   Negative   Blood, UA See below: (A)     Leukocytes, UA Negative  Small (1+) (A)   Nitrite, UA Negative     RBC, UA  13-20 (A)    Bacteria, UA  Comment    (A) Abnormal value       Comments are available for some flowsheets but are not being displayed.                Assessment and Plan    Diagnoses and all orders for this visit:    1. UTI symptoms (Primary)  -     POC Urinalysis Dipstick, Multipro  -     sulfamethoxazole-trimethoprim (Bactrim DS) 800-160 MG per tablet; Take 1 tablet by mouth 2 (Two) Times a Day.  Dispense: 10 tablet; Refill: 0    2. Acute cystitis with hematuria  -     sulfamethoxazole-trimethoprim (Bactrim DS) 800-160 MG per tablet; Take 1 tablet by mouth 2 (Two) Times a Day.  Dispense: 10 tablet; Refill: 0      I spent 15 minutes caring for Elizabeth on this date of service. This time includes time spent by me in the following activities:preparing for the visit, reviewing tests, obtaining and/or reviewing a separately obtained history, performing a medically appropriate examination and/or evaluation , counseling and educating the patient/family/caregiver, ordering medications, tests, or procedures, documenting information in the medical record and independently interpreting results and communicating that information with the patient/family/caregiver  Follow Up     No follow-ups on file.     Will not be able to culture urine related to poor sample. Will have her repeat UA in 1 week after antibiotic completion. Start antibiotic and increase fluid intake.     Patient was given instructions and counseling regarding her condition or for health maintenance advice. Please see specific information pulled into the AVS if appropriate.

## 2021-06-08 NOTE — PATIENT INSTRUCTIONS

## 2021-06-21 DIAGNOSIS — J06.9 UPPER RESPIRATORY TRACT INFECTION, UNSPECIFIED TYPE: ICD-10-CM

## 2021-06-21 DIAGNOSIS — G47.00 INSOMNIA, UNSPECIFIED TYPE: ICD-10-CM

## 2021-06-21 RX ORDER — FLUTICASONE PROPIONATE 50 MCG
2 SPRAY, SUSPENSION (ML) NASAL DAILY
Qty: 16 G | Refills: 3 | Status: SHIPPED | OUTPATIENT
Start: 2021-06-21 | End: 2021-09-13

## 2021-06-22 RX ORDER — PRAVASTATIN SODIUM 10 MG
TABLET ORAL
Qty: 90 TABLET | Refills: 3 | Status: SHIPPED | OUTPATIENT
Start: 2021-06-22 | End: 2022-06-29 | Stop reason: SDUPTHER

## 2021-06-22 RX ORDER — ZOLPIDEM TARTRATE 10 MG/1
TABLET ORAL
Qty: 90 TABLET | Refills: 1 | Status: SHIPPED | OUTPATIENT
Start: 2021-06-22 | End: 2021-12-13

## 2021-06-22 RX ORDER — LISINOPRIL 40 MG/1
TABLET ORAL
Qty: 90 TABLET | Refills: 3 | Status: SHIPPED | OUTPATIENT
Start: 2021-06-22 | End: 2022-06-29 | Stop reason: SDUPTHER

## 2021-08-12 ENCOUNTER — TELEPHONE (OUTPATIENT)
Dept: INTERNAL MEDICINE | Facility: CLINIC | Age: 61
End: 2021-08-12

## 2021-08-12 NOTE — TELEPHONE ENCOUNTER
Pt stated she did have a phone visit on 5/10/2021 due to covid but insurance isn't covering it fully. Informed her she can contact our billing office to check this for billing codes and financial help. She stated she already did. Informed her nothing could be changed on this end.

## 2021-09-13 ENCOUNTER — OFFICE VISIT (OUTPATIENT)
Dept: INTERNAL MEDICINE | Facility: CLINIC | Age: 61
End: 2021-09-13

## 2021-09-13 VITALS
WEIGHT: 133 LBS | OXYGEN SATURATION: 98 % | DIASTOLIC BLOOD PRESSURE: 66 MMHG | HEART RATE: 83 BPM | SYSTOLIC BLOOD PRESSURE: 100 MMHG | BODY MASS INDEX: 20.88 KG/M2 | HEIGHT: 67 IN | TEMPERATURE: 97.3 F

## 2021-09-13 DIAGNOSIS — I10 ESSENTIAL HYPERTENSION: ICD-10-CM

## 2021-09-13 DIAGNOSIS — R73.01 IFG (IMPAIRED FASTING GLUCOSE): Primary | ICD-10-CM

## 2021-09-13 DIAGNOSIS — E78.2 MIXED HYPERLIPIDEMIA: ICD-10-CM

## 2021-09-13 LAB — HBA1C MFR BLD: 5.5 %

## 2021-09-13 PROCEDURE — 99213 OFFICE O/P EST LOW 20 MIN: CPT | Performed by: INTERNAL MEDICINE

## 2021-09-13 NOTE — PROGRESS NOTES
Subjective   Elizabeth Carrillo is a 61 y.o. female.   Chief Complaint   Patient presents with   • Hypertension   • Prediabetes     a1c: 5.5       History of Present Illness patient is here for routine follow-up she has high blood pressure prediabetes she is doing quite well taking her medications as prescribed she also has hyperlipidemia.    The following portions of the patient's history were reviewed and updated as appropriate: allergies, current medications, past family history, past medical history, past social history, past surgical history and problem list.    Review of Systems   Constitutional: Negative for activity change, appetite change, fatigue, fever, unexpected weight gain and unexpected weight loss.   HENT: Negative for swollen glands, trouble swallowing and voice change.    Eyes: Negative for blurred vision and visual disturbance.   Respiratory: Negative for cough and shortness of breath.    Cardiovascular: Negative for chest pain, palpitations and leg swelling.   Gastrointestinal: Negative for abdominal pain, constipation, diarrhea, nausea, vomiting and indigestion.   Endocrine: Negative for cold intolerance, heat intolerance, polydipsia and polyphagia.   Genitourinary: Negative for dysuria and frequency.   Musculoskeletal: Negative for arthralgias, back pain, joint swelling and neck pain.   Skin: Negative for color change, rash and skin lesions.   Neurological: Negative for dizziness, weakness, headache, memory problem and confusion.   Hematological: Does not bruise/bleed easily.   Psychiatric/Behavioral: Negative for agitation, hallucinations and suicidal ideas. The patient is not nervous/anxious.        Objective   Past Medical History:   Diagnosis Date   • ASCUS with positive high risk HPV    • Dysmenorrhea    • Endometrial thickening on ultra sound    • Follow-up examination after gynecological surgery    • Menopausal symptoms    • Menorrhagia    • Pelvic pain    • Vaginitis       Past Surgical  History:   Procedure Laterality Date   • APPENDECTOMY N/A 11/14/2019    Procedure: APPENDECTOMY LAPAROSCOPIC POSSIBLE OPEN;  Surgeon: Angeles Gallagher MD;  Location: [facility] OR;  Service: General   • CHOLECYSTECTOMY     • COLONOSCOPY N/A 2/20/2020    Procedure: COLONOSCOPY WITH ANESTHESIA;  Surgeon: Thaddeus Meraz DO;  Location: [facility] ENDOSCOPY;  Service: Gastroenterology;  Laterality: N/A;  preop; abdominal pain  postop; diverticulosis   PCP Jason Cuello    • DILATATION AND CURETTAGE      of uterus        Current Outpatient Medications:   •  lisinopril (PRINIVIL,ZESTRIL) 40 MG tablet, TAKE 1 TABLET BY MOUTH DAILY, Disp: 90 tablet, Rfl: 3  •  pravastatin (PRAVACHOL) 10 MG tablet, TAKE ONE TABLET BY MOUTH EVERY DAY, Disp: 90 tablet, Rfl: 3  •  traZODone (DESYREL) 100 MG tablet, TAKE ONE TABLET BY MOUTH EVERY BEDTIME AS NEEDED **MAY MAKE DROWSY**, Disp: 90 tablet, Rfl: 3  •  zolpidem (AMBIEN) 10 MG tablet, TAKE ONE TABLET BY MOUTH EVERY BEDTIME AS NEEDED, Disp: 90 tablet, Rfl: 1      Vitals:    09/13/21 1455   BP: 100/66   Pulse: 83   Temp: 97.3 °F (36.3 °C)   SpO2: 98%         09/13/21  1455   Weight: 60.3 kg (133 lb)       Body mass index is 20.83 kg/m².    Physical Exam  Constitutional:       Appearance: She is well-developed.   HENT:      Head: Normocephalic and atraumatic.   Eyes:      Pupils: Pupils are equal, round, and reactive to light.   Cardiovascular:      Rate and Rhythm: Normal rate and regular rhythm.   Pulmonary:      Effort: Pulmonary effort is normal.      Breath sounds: Normal breath sounds.   Abdominal:      General: Bowel sounds are normal.      Palpations: Abdomen is soft.   Musculoskeletal:         General: Normal range of motion.      Cervical back: Normal range of motion and neck supple.   Skin:     General: Skin is warm and dry.   Neurological:      Mental Status: She is alert and oriented to person, place, and time.   Psychiatric:         Behavior: Behavior normal.                Assessment/Plan   Diagnoses and all orders for this visit:    1. IFG (impaired fasting glucose) (Primary)  -     POC Glycosylated Hemoglobin (Hb A1C)    2. Essential hypertension    3. Mixed hyperlipidemia        Today's visit we reviewed her A1c which is good control her blood pressure which is excellent control she is currently taking low-dose statin therapy more as a preventative measure than for treatment.  We will see her back in 6 months for annual checkup.

## 2021-12-13 DIAGNOSIS — G47.00 INSOMNIA, UNSPECIFIED TYPE: ICD-10-CM

## 2021-12-13 RX ORDER — ZOLPIDEM TARTRATE 10 MG/1
TABLET ORAL
Qty: 90 TABLET | Refills: 0 | Status: SHIPPED | OUTPATIENT
Start: 2021-12-13 | End: 2022-03-10

## 2022-01-10 ENCOUNTER — TELEPHONE (OUTPATIENT)
Dept: INTERNAL MEDICINE | Facility: CLINIC | Age: 62
End: 2022-01-10

## 2022-01-11 ENCOUNTER — OFFICE VISIT (OUTPATIENT)
Dept: INTERNAL MEDICINE | Facility: CLINIC | Age: 62
End: 2022-01-11

## 2022-01-11 VITALS
DIASTOLIC BLOOD PRESSURE: 78 MMHG | HEART RATE: 94 BPM | WEIGHT: 133 LBS | SYSTOLIC BLOOD PRESSURE: 118 MMHG | TEMPERATURE: 97.3 F | HEIGHT: 67 IN | BODY MASS INDEX: 20.88 KG/M2 | OXYGEN SATURATION: 98 %

## 2022-01-11 DIAGNOSIS — J06.9 ACUTE URI: Primary | ICD-10-CM

## 2022-01-11 DIAGNOSIS — Z20.822 CONTACT WITH AND (SUSPECTED) EXPOSURE TO COVID-19: ICD-10-CM

## 2022-01-11 DIAGNOSIS — J22 LOWER RESPIRATORY INFECTION: ICD-10-CM

## 2022-01-11 LAB
EXPIRATION DATE: NORMAL
INTERNAL CONTROL: NORMAL
Lab: NORMAL
S PYO AG THROAT QL: NEGATIVE

## 2022-01-11 PROCEDURE — 87880 STREP A ASSAY W/OPTIC: CPT | Performed by: NURSE PRACTITIONER

## 2022-01-11 PROCEDURE — 99213 OFFICE O/P EST LOW 20 MIN: CPT | Performed by: NURSE PRACTITIONER

## 2022-01-11 RX ORDER — GUAIFENESIN 600 MG/1
600 TABLET, EXTENDED RELEASE ORAL 2 TIMES DAILY
Qty: 28 TABLET | Refills: 0 | Status: SHIPPED | OUTPATIENT
Start: 2022-01-11 | End: 2022-01-25

## 2022-01-11 RX ORDER — CEFDINIR 300 MG/1
300 CAPSULE ORAL 2 TIMES DAILY
Qty: 20 CAPSULE | Refills: 0 | Status: SHIPPED | OUTPATIENT
Start: 2022-01-11 | End: 2022-03-17

## 2022-01-11 NOTE — PROGRESS NOTES
Subjective   Elizabeth Carrillo is a 61 y.o. female.   Chief Complaint   Patient presents with   • Cough     4-5 weeks- green stuff - took zpack and steroids   • Back Pain     worse over the weekend       Ms. Carrillo present to the office related to acute respiratory complaints.     Cough  This is a recurrent problem. The current episode started 1 to 4 weeks ago. The problem has been waxing and waning. The problem occurs every few minutes. The cough is productive of purulent sputum. Associated symptoms include myalgias, nasal congestion, postnasal drip, rhinorrhea, a sore throat and wheezing. Pertinent negatives include no chest pain, chills, ear congestion, ear pain, fever, hemoptysis, rash, shortness of breath or weight loss. Exacerbated by: smoking daily. Risk factors for lung disease include smoking/tobacco exposure. She has tried oral steroids (antibiotics) for the symptoms. The treatment provided mild relief. Her past medical history is significant for bronchitis, emphysema and environmental allergies. There is no history of pneumonia.   Back Pain  This is a new problem. The current episode started in the past 7 days. The problem occurs intermittently. The pain is present in the thoracic spine. The quality of the pain is described as aching. The pain does not radiate. The pain is at a severity of 3/10. The pain is mild. The pain is the same all the time. Pertinent negatives include no abdominal pain, chest pain, dysuria, fever, weakness or weight loss.        The following portions of the patient's history were reviewed and updated as appropriate: allergies, current medications, past family history, past medical history, past social history, past surgical history and problem list.    Review of Systems   Constitutional: Positive for fatigue. Negative for activity change, appetite change, chills, fever, unexpected weight gain and unexpected weight loss.   HENT: Positive for congestion, postnasal drip, rhinorrhea and  sore throat. Negative for ear pain, swollen glands, trouble swallowing and voice change.    Eyes: Negative for blurred vision and visual disturbance.   Respiratory: Positive for cough, chest tightness and wheezing. Negative for hemoptysis and shortness of breath.    Cardiovascular: Negative for chest pain, palpitations and leg swelling.   Gastrointestinal: Negative for abdominal pain, constipation, diarrhea, nausea, vomiting and indigestion.   Endocrine: Negative for cold intolerance, heat intolerance, polydipsia and polyphagia.   Genitourinary: Negative for dysuria and frequency.   Musculoskeletal: Positive for back pain and myalgias. Negative for arthralgias, joint swelling and neck pain.   Skin: Negative for color change, rash and skin lesions.   Allergic/Immunologic: Positive for environmental allergies.   Neurological: Negative for dizziness, weakness, headache, memory problem and confusion.   Hematological: Does not bruise/bleed easily.   Psychiatric/Behavioral: Negative for agitation, hallucinations and suicidal ideas. The patient is not nervous/anxious.        Objective   Past Medical History:   Diagnosis Date   • ASCUS with positive high risk HPV    • Dysmenorrhea    • Endometrial thickening on ultra sound    • Follow-up examination after gynecological surgery    • Menopausal symptoms    • Menorrhagia    • Pelvic pain    • Vaginitis       Past Surgical History:   Procedure Laterality Date   • APPENDECTOMY N/A 11/14/2019    Procedure: APPENDECTOMY LAPAROSCOPIC POSSIBLE OPEN;  Surgeon: Angeles Gallagher MD;  Location: [facility] OR;  Service: General   • CHOLECYSTECTOMY     • COLONOSCOPY N/A 2/20/2020    Procedure: COLONOSCOPY WITH ANESTHESIA;  Surgeon: Thaddeus Meraz DO;  Location: [facility] ENDOSCOPY;  Service: Gastroenterology;  Laterality: N/A;  preop; abdominal pain  postop; diverticulosis   PCP Jason Cuello    • DILATATION AND CURETTAGE      of uterus        Current Outpatient Medications:   •  cefdinir  (OMNICEF) 300 MG capsule, Take 1 capsule by mouth 2 (Two) Times a Day., Disp: 20 capsule, Rfl: 0  •  guaiFENesin (Mucinex) 600 MG 12 hr tablet, Take 1 tablet by mouth 2 (Two) Times a Day for 14 days., Disp: 28 tablet, Rfl: 0  •  lisinopril (PRINIVIL,ZESTRIL) 40 MG tablet, TAKE 1 TABLET BY MOUTH DAILY, Disp: 90 tablet, Rfl: 3  •  pravastatin (PRAVACHOL) 10 MG tablet, TAKE ONE TABLET BY MOUTH EVERY DAY, Disp: 90 tablet, Rfl: 3  •  traZODone (DESYREL) 100 MG tablet, TAKE ONE TABLET BY MOUTH EVERY BEDTIME AS NEEDED **MAY MAKE DROWSY**, Disp: 90 tablet, Rfl: 3  •  zolpidem (AMBIEN) 10 MG tablet, TAKE ONE TABLET BY MOUTH EVERY BEDTIME AS NEEDED **MAY MAKE DROWSY**, Disp: 90 tablet, Rfl: 0      Vitals:    01/11/22 0926   BP: 118/78   Pulse: 94   Temp: 97.3 °F (36.3 °C)   SpO2: 98%         01/11/22 0926   Weight: 60.3 kg (133 lb)       Body mass index is 20.83 kg/m².    Physical Exam  Constitutional:       Appearance: She is well-developed.   HENT:      Head: Normocephalic and atraumatic.   Eyes:      Pupils: Pupils are equal, round, and reactive to light.   Cardiovascular:      Rate and Rhythm: Normal rate and regular rhythm.   Pulmonary:      Effort: Pulmonary effort is normal.      Breath sounds: Examination of the right-upper field reveals wheezing. Examination of the left-upper field reveals wheezing. Examination of the right-lower field reveals decreased breath sounds. Examination of the left-lower field reveals decreased breath sounds. Decreased breath sounds and wheezing present.   Abdominal:      General: Bowel sounds are normal.      Palpations: Abdomen is soft.   Musculoskeletal:         General: Normal range of motion.      Cervical back: Normal range of motion and neck supple.   Skin:     General: Skin is warm and dry.   Neurological:      Mental Status: She is alert and oriented to person, place, and time.   Psychiatric:         Behavior: Behavior normal.       Assessment/Plan   Diagnoses and all orders for  this visit:    1. Acute URI (Primary)  -     POCT rapid strep A  -     COVID-19,[facility] ROUTINE, NP/OP SWAB IN TRANSPORT MEDIA OR ESWAB 72 HR TAT - Swab, Nasopharynx  -     cefdinir (OMNICEF) 300 MG capsule; Take 1 capsule by mouth 2 (Two) Times a Day.  Dispense: 20 capsule; Refill: 0    2. Lower respiratory infection  -     cefdinir (OMNICEF) 300 MG capsule; Take 1 capsule by mouth 2 (Two) Times a Day.  Dispense: 20 capsule; Refill: 0  -     guaiFENesin (Mucinex) 600 MG 12 hr tablet; Take 1 tablet by mouth 2 (Two) Times a Day for 14 days.  Dispense: 28 tablet; Refill: 0    3. Contact with and (suspected) exposure to covid-19      Patient lung sounds indicate possible pneumonia vs chronic emphysema. She is reporting productive cough and increased fatigue. Will go ahead and send antibiotic and determine if improvement. Educated her on needing to quit smoking, but verbalized she is not ready to quit at this time. Would like for her to start mucinex DM 1 tablet twice a day until symptoms improve. Have her increase oral hydration and start taking multivitamin or at minimum vitamin B12, Zinc vitamin C and D.

## 2022-01-13 ENCOUNTER — TELEPHONE (OUTPATIENT)
Dept: INTERNAL MEDICINE | Facility: CLINIC | Age: 62
End: 2022-01-13

## 2022-01-13 LAB
LABCORP SARS-COV-2, NAA 2 DAY TAT: NORMAL
SARS-COV-2 RNA RESP QL NAA+PROBE: NOT DETECTED

## 2022-01-13 NOTE — TELEPHONE ENCOUNTER
1. Please arrange for 1 month followup for IUD (intrauterine device) string check.  2. Please advise the patient to call us immediately if the IUD comes out, or if heavy bleeding.  However the patient should be aware that the irregular spotting and bleeding can occur in the first 3 months of placement and this is not uncommon. Also cramping in the first 24-72 hours after placement is also common.  3. The IUD is effective at 99% in preventing pregnancy, however there is still a slight chance for pregnancy.  4. Also advised patient to take ibuprofen 200 mg tablets 2-3 every 6 hours in the first 1-2 days after placement of IUD if cramping.       Spoke with pt gave negative results

## 2022-01-14 ENCOUNTER — TELEPHONE (OUTPATIENT)
Dept: INTERNAL MEDICINE | Facility: CLINIC | Age: 62
End: 2022-01-14

## 2022-01-24 ENCOUNTER — TELEPHONE (OUTPATIENT)
Dept: INTERNAL MEDICINE | Facility: CLINIC | Age: 62
End: 2022-01-24

## 2022-01-24 DIAGNOSIS — J22 LOWER RESPIRATORY INFECTION: Primary | ICD-10-CM

## 2022-01-24 RX ORDER — DOXYCYCLINE HYCLATE 100 MG/1
100 CAPSULE ORAL 2 TIMES DAILY
Qty: 20 CAPSULE | Refills: 0 | Status: SHIPPED | OUTPATIENT
Start: 2022-01-24 | End: 2022-03-17

## 2022-01-24 NOTE — TELEPHONE ENCOUNTER
"Caller: Elizabeth Carrillo    Relationship:     Best call back number: [phone]    What medication are you requesting: ANTIBIOTIC, MUCINEX    What are your current symptoms: COUGHING UP \"COLORED MUCUS\"    How long have you been experiencing symptoms: COUPLE OF WEEKS     Have you had these symptoms before:    [x] Yes  [] No    Have you been treated for these symptoms before:   [x] Yes  [] No    If a prescription is needed, what is your preferred pharmacy and phone number:  [facility] - [place], [place] - [address] [place] [address] [phone] [facility] [phone]   [phone]    Additional notes: WOULD LIKE A CALLBACK IF SOMETHING CALLED IN.           "

## 2022-03-10 DIAGNOSIS — G47.00 INSOMNIA, UNSPECIFIED TYPE: ICD-10-CM

## 2022-03-10 RX ORDER — ZOLPIDEM TARTRATE 10 MG/1
TABLET ORAL
Qty: 90 TABLET | Refills: 0 | Status: SHIPPED | OUTPATIENT
Start: 2022-03-10 | End: 2022-06-07

## 2022-03-10 RX ORDER — TRAZODONE HYDROCHLORIDE 100 MG/1
TABLET ORAL
Qty: 90 TABLET | Refills: 0 | Status: SHIPPED | OUTPATIENT
Start: 2022-03-10 | End: 2022-06-29 | Stop reason: SDUPTHER

## 2022-03-17 ENCOUNTER — OFFICE VISIT (OUTPATIENT)
Dept: INTERNAL MEDICINE | Facility: CLINIC | Age: 62
End: 2022-03-17

## 2022-03-17 VITALS
BODY MASS INDEX: 21.5 KG/M2 | SYSTOLIC BLOOD PRESSURE: 110 MMHG | OXYGEN SATURATION: 99 % | DIASTOLIC BLOOD PRESSURE: 70 MMHG | HEIGHT: 67 IN | HEART RATE: 74 BPM | TEMPERATURE: 96.9 F | WEIGHT: 137 LBS

## 2022-03-17 DIAGNOSIS — Z72.0 TOBACCO USE: ICD-10-CM

## 2022-03-17 DIAGNOSIS — R73.01 IFG (IMPAIRED FASTING GLUCOSE): ICD-10-CM

## 2022-03-17 DIAGNOSIS — Z00.00 WELLNESS EXAMINATION: Primary | ICD-10-CM

## 2022-03-17 DIAGNOSIS — G47.00 INSOMNIA, UNSPECIFIED TYPE: ICD-10-CM

## 2022-03-17 DIAGNOSIS — I10 ESSENTIAL HYPERTENSION: ICD-10-CM

## 2022-03-17 LAB — HBA1C MFR BLD: 5.5 %

## 2022-03-17 PROCEDURE — 99396 PREV VISIT EST AGE 40-64: CPT | Performed by: FAMILY MEDICINE

## 2022-03-17 PROCEDURE — 83036 HEMOGLOBIN GLYCOSYLATED A1C: CPT | Performed by: FAMILY MEDICINE

## 2022-03-18 NOTE — PROGRESS NOTES
Subjective     Chief Complaint   Patient presents with   • Annual Exam     A1c: 5.5       History of Present Illness   The patient has consented to being recorded using ANGE.    The patient is stable on lisinopril, pravastatin, trazodone, and Ambien.    She recently had pneumonia, and took three rounds of antibiotics. However, she continues to have cough, with expectoration of green sputum daily. The patient was advised by Dr. Cuello that she had chronic bronchitis. She denies obtaining a pulmonary function study. She currently smokes 1 pack of cigarettes daily, since she was 15 years of age. The patient reports she typically gets pneumonia 1 time yearly, and obtains a CAT scan; however, she has not obtained one recently. She admits to intermittent use of inhalers. The patient admits to drinking an increase amount of water.     The patient demonstrates bruising on her skin, and notes she bruises easily.     Patient's PMR from outside medical facility reviewed and noted.    Review of Systems     Otherwise complete ROS reviewed and negative except as mentioned in the HPI.    Past Medical History:   Past Medical History:   Diagnosis Date   • ASCUS with positive high risk HPV    • Dysmenorrhea    • Endometrial thickening on ultra sound    • Follow-up examination after gynecological surgery    • Menopausal symptoms    • Menorrhagia    • Pelvic pain    • Vaginitis      Past Surgical History:  Past Surgical History:   Procedure Laterality Date   • APPENDECTOMY N/A 11/14/2019    Procedure: APPENDECTOMY LAPAROSCOPIC POSSIBLE OPEN;  Surgeon: Angeles Gallagher MD;  Location: [facility] OR;  Service: General   • CHOLECYSTECTOMY     • COLONOSCOPY N/A 2/20/2020    Procedure: COLONOSCOPY WITH ANESTHESIA;  Surgeon: Thaddeus Meraz DO;  Location: [facility] ENDOSCOPY;  Service: Gastroenterology;  Laterality: N/A;  preop; abdominal pain  postop; diverticulosis   PCP Jason Cuello    • DILATATION AND CURETTAGE      of uterus  "    Social History:  reports that she has been smoking cigarettes. She has a 40.00 pack-year smoking history. She has never used smokeless tobacco. She reports current alcohol use. She reports that she does not use drugs.    Family History: family history includes Diabetes in her father and mother.      Allergies:  Allergies   Allergen Reactions   • Codeine Nausea And Vomiting     Medications:  Prior to Admission medications    Medication Sig Start Date End Date Taking? Authorizing Provider   lisinopril (PRINIVIL,ZESTRIL) 40 MG tablet TAKE 1 TABLET BY MOUTH DAILY 6/22/21  Yes Jason Cuello MD   pravastatin (PRAVACHOL) 10 MG tablet TAKE ONE TABLET BY MOUTH EVERY DAY 6/22/21  Yes Jason Cuello MD   traZODone (DESYREL) 100 MG tablet TAKE ONE TABLET BY MOUTH EVERY BEDTIME AS NEEDED **MAY MAKE DROWSY** 3/10/22  Yes Genesis Loepz DO   zolpidem (AMBIEN) 10 MG tablet TAKE ONE TABLET BY MOUTH EVERY BEDTIME AS NEEDED **MAY MAKE DROWSY** 3/10/22  Yes Genesis Lopez DO   cefdinir (OMNICEF) 300 MG capsule Take 1 capsule by mouth 2 (Two) Times a Day. 1/11/22 3/17/22  Marya Meraz APRN   doxycycline (VIBRAMYCIN) 100 MG capsule Take 1 capsule by mouth 2 (Two) Times a Day. 1/24/22 3/17/22  Marya Meraz APRN       Objective     Vital Signs: /70 (BP Location: Left arm, Patient Position: Sitting, Cuff Size: Adult)   Pulse 74   Temp 96.9 °F (36.1 °C) (Temporal)   Ht 170.2 cm (67\")   Wt 62.1 kg (137 lb)   SpO2 99%   Breastfeeding No   BMI 21.46 kg/m²   Physical Exam  Vitals and nursing note reviewed.   Constitutional:       Appearance: Normal appearance. She is well-developed.   HENT:      Head: Normocephalic and atraumatic.      Right Ear: External ear normal.      Left Ear: External ear normal.      Nose: Nose normal.      Mouth/Throat:      Mouth: Mucous membranes are moist.   Eyes:      Extraocular Movements: Extraocular movements intact.      Conjunctiva/sclera: " Conjunctivae normal.      Pupils: Pupils are equal, round, and reactive to light.   Neck:      Thyroid: No thyromegaly.      Vascular: No JVD.      Trachea: No tracheal deviation.   Cardiovascular:      Rate and Rhythm: Normal rate and regular rhythm.      Pulses: Normal pulses.      Heart sounds: Normal heart sounds. No murmur heard.    No friction rub. No gallop.   Pulmonary:      Effort: Pulmonary effort is normal.      Breath sounds: Normal breath sounds.   Abdominal:      General: Bowel sounds are normal. There is no distension.      Palpations: Abdomen is soft.      Tenderness: There is no abdominal tenderness.   Musculoskeletal:         General: Normal range of motion.      Cervical back: Normal range of motion and neck supple.   Lymphadenopathy:      Cervical: No cervical adenopathy.   Skin:     General: Skin is warm and dry.      Capillary Refill: Capillary refill takes less than 2 seconds.      Findings: Bruising present.   Neurological:      Mental Status: She is alert and oriented to person, place, and time.      Cranial Nerves: No cranial nerve deficit.      Coordination: Coordination normal.   Psychiatric:         Mood and Affect: Mood normal.         Behavior: Behavior normal.         Patient's Body mass index is 21.46 kg/m². indicating that she is within normal range (BMI 18.5-24.9). No BMI management plan needed..      Results Reviewed:  Glucose   Date Value Ref Range Status   03/11/2021 87 65 - 99 mg/dL Final   11/15/2019 148 (H) 65 - 99 mg/dL Final     BUN   Date Value Ref Range Status   03/11/2021 10 8 - 23 mg/dL Final   11/15/2019 13 8 - 23 mg/dL Final     Creatinine   Date Value Ref Range Status   03/11/2021 0.67 0.57 - 1.00 mg/dL Final   11/15/2019 0.74 0.57 - 1.00 mg/dL Final     Sodium   Date Value Ref Range Status   03/11/2021 138 136 - 145 mmol/L Final   11/15/2019 136 136 - 145 mmol/L Final     Potassium   Date Value Ref Range Status   03/11/2021 4.6 3.5 - 5.2 mmol/L Final   11/15/2019  4.9 3.5 - 5.2 mmol/L Final     Chloride   Date Value Ref Range Status   03/11/2021 98 98 - 107 mmol/L Final   11/15/2019 99 98 - 107 mmol/L Final     CO2   Date Value Ref Range Status   11/15/2019 29.0 22.0 - 29.0 mmol/L Final     Total CO2   Date Value Ref Range Status   03/11/2021 29.2 (H) 22.0 - 29.0 mmol/L Final     Calcium   Date Value Ref Range Status   03/11/2021 9.9 8.6 - 10.5 mg/dL Final   11/15/2019 9.4 8.6 - 10.5 mg/dL Final     ALT (SGPT)   Date Value Ref Range Status   03/11/2021 17 1 - 33 U/L Final   11/14/2019 12 1 - 33 U/L Final     AST (SGOT)   Date Value Ref Range Status   03/11/2021 21 1 - 32 U/L Final   11/14/2019 16 1 - 32 U/L Final     WBC   Date Value Ref Range Status   03/11/2021 13.66 (H) 3.40 - 10.80 10*3/mm3 Final     Hematocrit   Date Value Ref Range Status   03/11/2021 38.4 34.0 - 46.6 % Final   11/15/2019 34.3 34.0 - 46.6 % Final     Platelets   Date Value Ref Range Status   03/11/2021 344 140 - 450 10*3/mm3 Final   11/15/2019 239 140 - 450 10*3/mm3 Final     Triglycerides   Date Value Ref Range Status   03/11/2021 83 0 - 150 mg/dL Final     HDL Cholesterol   Date Value Ref Range Status   03/11/2021 60 40 - 60 mg/dL Final     LDL Chol Calc (NIH)   Date Value Ref Range Status   03/11/2021 98 0 - 100 mg/dL Final     Hemoglobin A1C   Date Value Ref Range Status   03/17/2022 5.5 % Final         Assessment / Plan     Assessment/Plan:  1. Wellness examination    - CBC & Differential  - Comprehensive Metabolic Panel  - Urinalysis With Microscopic - Urine, Clean Catch  - TSH  - Lipid Panel  - Uric Acid    2. Insomnia, unspecified type    - Compliance Drug Analysis, Ur - Urine, Clean Catch    3. IFG (impaired fasting glucose)    - POC Glycated Hemoglobin, Total    Chronic tobacco abuse  - Lengthy discussion was held with patient regarding smoking and the harmful causes.  - Recommend doing a screening CT of her chest. Patient said she will think about this.  - We also recommended doing pulmonary  function studies, and patient is not anxious to do this at all.     Essential hypertension  - Patient will continue her current medications.    Return in about 6 months (around 9/17/2022). unless patient needs to be seen sooner or acute issues arise.      I have discussed the patient results/orders and and plan/recommendation with them at today's visit.      Cipriano Anderson   03/17/2022    Transcribed from ambient dictation for Genesis Lopez DO by Cipriano Anderson.  03/18/22   05:19 CDT    Patient verbalized consent to the visit recording.

## 2022-06-07 DIAGNOSIS — G47.00 INSOMNIA, UNSPECIFIED TYPE: ICD-10-CM

## 2022-06-07 RX ORDER — ZOLPIDEM TARTRATE 10 MG/1
TABLET ORAL
Qty: 90 TABLET | Refills: 0 | Status: SHIPPED | OUTPATIENT
Start: 2022-06-07 | End: 2022-09-08

## 2022-06-29 ENCOUNTER — TELEPHONE (OUTPATIENT)
Dept: INTERNAL MEDICINE | Facility: CLINIC | Age: 62
End: 2022-06-29

## 2022-06-29 DIAGNOSIS — G47.00 INSOMNIA, UNSPECIFIED TYPE: ICD-10-CM

## 2022-06-29 RX ORDER — ZOLPIDEM TARTRATE 10 MG/1
10 TABLET ORAL NIGHTLY PRN
Qty: 90 TABLET | Refills: 0 | OUTPATIENT
Start: 2022-06-29

## 2022-06-29 RX ORDER — LISINOPRIL 40 MG/1
40 TABLET ORAL DAILY
Qty: 90 TABLET | Refills: 3 | Status: CANCELLED | OUTPATIENT
Start: 2022-06-29

## 2022-06-29 RX ORDER — LISINOPRIL 40 MG/1
40 TABLET ORAL DAILY
Qty: 90 TABLET | Refills: 3 | Status: SHIPPED | OUTPATIENT
Start: 2022-06-29

## 2022-06-29 RX ORDER — TRAZODONE HYDROCHLORIDE 100 MG/1
TABLET ORAL
Qty: 90 TABLET | Refills: 0 | Status: CANCELLED | OUTPATIENT
Start: 2022-06-29

## 2022-06-29 RX ORDER — PRAVASTATIN SODIUM 10 MG
10 TABLET ORAL DAILY
Qty: 90 TABLET | Refills: 3 | Status: SHIPPED | OUTPATIENT
Start: 2022-06-29

## 2022-06-29 RX ORDER — TRAZODONE HYDROCHLORIDE 100 MG/1
100 TABLET ORAL NIGHTLY
Qty: 90 TABLET | Refills: 0 | Status: SHIPPED | OUTPATIENT
Start: 2022-06-29 | End: 2022-11-21

## 2022-06-29 RX ORDER — ZOLPIDEM TARTRATE 10 MG/1
TABLET ORAL
Qty: 90 TABLET | Refills: 0 | Status: CANCELLED | OUTPATIENT
Start: 2022-06-29

## 2022-06-29 RX ORDER — PRAVASTATIN SODIUM 10 MG
10 TABLET ORAL DAILY
Qty: 90 TABLET | Refills: 3 | Status: CANCELLED | OUTPATIENT
Start: 2022-06-29

## 2022-06-29 NOTE — TELEPHONE ENCOUNTER
Caller: Elizabeth Carrillo    Relationship: Self    Best call back number: [phone]    Requested Prescriptions:   Requested Prescriptions     Pending Prescriptions Disp Refills   • lisinopril (PRINIVIL,ZESTRIL) 40 MG tablet 90 tablet 3     Sig: Take 1 tablet by mouth Daily.   • pravastatin (PRAVACHOL) 10 MG tablet 90 tablet 3     Sig: Take 1 tablet by mouth Daily.   • traZODone (DESYREL) 100 MG tablet 90 tablet 0   • zolpidem (AMBIEN) 10 MG tablet 90 tablet 0        Pharmacy where request should be sent: [facility] - [facility] [address] [facility] [phone] [facility] [phone] [place]     Please advise when and if medication can be called in. If unable to be filled, please advise with callback at the phone number listed above.    Thank you,  Sloan Chau, PCT

## 2022-09-08 DIAGNOSIS — G47.00 INSOMNIA, UNSPECIFIED TYPE: ICD-10-CM

## 2022-09-08 RX ORDER — ZOLPIDEM TARTRATE 10 MG/1
TABLET ORAL
Qty: 90 TABLET | Refills: 0 | Status: SHIPPED | OUTPATIENT
Start: 2022-09-08 | End: 2022-12-07

## 2022-09-15 ENCOUNTER — OFFICE VISIT (OUTPATIENT)
Dept: INTERNAL MEDICINE | Facility: CLINIC | Age: 62
End: 2022-09-15

## 2022-09-15 VITALS
HEIGHT: 67 IN | SYSTOLIC BLOOD PRESSURE: 118 MMHG | DIASTOLIC BLOOD PRESSURE: 80 MMHG | BODY MASS INDEX: 21.97 KG/M2 | HEART RATE: 90 BPM | OXYGEN SATURATION: 98 % | TEMPERATURE: 97.3 F | WEIGHT: 140 LBS

## 2022-09-15 DIAGNOSIS — Z72.0 CONTINUOUS TOBACCO ABUSE: ICD-10-CM

## 2022-09-15 DIAGNOSIS — R73.03 PREDIABETES: ICD-10-CM

## 2022-09-15 DIAGNOSIS — I10 ESSENTIAL HYPERTENSION: Primary | ICD-10-CM

## 2022-09-15 DIAGNOSIS — Z79.899 ENCOUNTER FOR LONG-TERM (CURRENT) USE OF OTHER MEDICATIONS: ICD-10-CM

## 2022-09-15 PROBLEM — R73.01 IFG (IMPAIRED FASTING GLUCOSE): Status: RESOLVED | Noted: 2020-09-10 | Resolved: 2022-09-15

## 2022-09-15 PROCEDURE — 99214 OFFICE O/P EST MOD 30 MIN: CPT | Performed by: FAMILY MEDICINE

## 2022-09-15 NOTE — PROGRESS NOTES
Subjective     Chief Complaint   Patient presents with   • Hypertension   • Prediabetes       History of Present Illness  The patient is a female who presents tot the clinic for a 6 month medical follow up on pre diabetes and hypertension.     The patient has not been monitoring her blood pressure. She is currently taking her medications for her history of hypertension.     The patient has been coughing with clear sputum everyday that has persisted since she had pneumonia in 03/2022. She notes that her lisinopril may attribute to her symptoms. The patient has been smoking around 1 pack of cigarettes every day since she was approximately 15 or 16 years old. She underwent several computed tomography scans of her abdomen and pelvis, 1 ultrasound of the pelvis, and 4 chest x-rays in the past; however, she has not had a computed tomography scan of her chest. She notes that she does not feel bad, and she would not like to undergo diagnostic testing at this time. The patient states that she drank Christiano-Aid today. She takes Zyrtec everyday.     The patient notes that she bruises easily, and she gets blood blisters underneath her skin when she bumps into things.    She does not get the influenza immunization secondary to her mother's diagnosis of Guillain- [place] syndrome.    Patient's PMR from outside medical facility reviewed and noted.    Review of Systems   Constitutional: Negative for activity change, appetite change and fatigue.   Respiratory: Negative for cough and shortness of breath.    Cardiovascular: Negative for chest pain and palpitations.   Gastrointestinal: Negative for abdominal pain, constipation, diarrhea, nausea and vomiting.        Otherwise complete ROS reviewed and negative except as mentioned in the HPI.    Past Medical History:   Past Medical History:   Diagnosis Date   • ASCUS with positive high risk HPV    • Dysmenorrhea    • Endometrial thickening on ultra sound    • Follow-up examination  "after gynecological surgery    • Menopausal symptoms    • Menorrhagia    • Pelvic pain    • Vaginitis      Past Surgical History:  Past Surgical History:   Procedure Laterality Date   • APPENDECTOMY N/A 11/14/2019    Procedure: APPENDECTOMY LAPAROSCOPIC POSSIBLE OPEN;  Surgeon: Angeles Gallagher MD;  Location: [facility] OR;  Service: General   • CHOLECYSTECTOMY     • COLONOSCOPY N/A 2/20/2020    Procedure: COLONOSCOPY WITH ANESTHESIA;  Surgeon: Thaddeus Meraz DO;  Location: [facility] ENDOSCOPY;  Service: Gastroenterology;  Laterality: N/A;  preop; abdominal pain  postop; diverticulosis   PCP Jason Cuello    • DILATATION AND CURETTAGE      of uterus     Social History:  reports that she has been smoking cigarettes. She has a 40.00 pack-year smoking history. She has never used smokeless tobacco. She reports current alcohol use. She reports that she does not use drugs.    Family History: family history includes Diabetes in her father and mother.      Allergies:  Allergies   Allergen Reactions   • Codeine Nausea And Vomiting     Medications:  Prior to Admission medications    Medication Sig Start Date End Date Taking? Authorizing Provider   lisinopril (PRINIVIL,ZESTRIL) 40 MG tablet Take 1 tablet by mouth Daily. 6/29/22  Yes Genesis Lopez DO   pravastatin (PRAVACHOL) 10 MG tablet Take 1 tablet by mouth Daily. 6/29/22  Yes Genesis Lopez DO   traZODone (DESYREL) 100 MG tablet Take 1 tablet by mouth Every Night. 6/29/22  Yes Genesis Lopez DO   zolpidem (AMBIEN) 10 MG tablet TAKE ONE TABLET BY MOUTH EVERY BEDTIME AS NEEDED **MAY MAKE DROWSY** 9/8/22  Yes Genesis Lopez DO       Objective     Vital Signs: /80 (BP Location: Left arm, Patient Position: Sitting, Cuff Size: Adult)   Pulse 90   Temp 97.3 °F (36.3 °C) (Temporal)   Ht 170.2 cm (67\")   Wt 63.5 kg (140 lb)   SpO2 98%   Breastfeeding No   BMI 21.93 kg/m²   Physical Exam  Vitals and nursing note reviewed.   Constitutional:       " Appearance: Normal appearance. She is well-developed.   HENT:      Head: Normocephalic and atraumatic.      Right Ear: External ear normal.      Left Ear: External ear normal.      Nose: Nose normal.      Mouth/Throat:      Mouth: Mucous membranes are moist.   Eyes:      Extraocular Movements: Extraocular movements intact.      Conjunctiva/sclera: Conjunctivae normal.      Pupils: Pupils are equal, round, and reactive to light.   Neck:      Thyroid: No thyromegaly.      Vascular: No JVD.      Trachea: No tracheal deviation.   Cardiovascular:      Rate and Rhythm: Normal rate and regular rhythm.      Pulses: Normal pulses.      Heart sounds: Normal heart sounds. No murmur heard.    No friction rub. No gallop.   Pulmonary:      Effort: Pulmonary effort is normal.      Breath sounds: Normal breath sounds.   Abdominal:      General: Bowel sounds are normal. There is no distension.      Palpations: Abdomen is soft.      Tenderness: There is no abdominal tenderness.   Musculoskeletal:         General: Normal range of motion.      Cervical back: Normal range of motion and neck supple.   Lymphadenopathy:      Cervical: No cervical adenopathy.   Skin:     General: Skin is warm and dry.      Capillary Refill: Capillary refill takes less than 2 seconds.   Neurological:      Mental Status: She is alert and oriented to person, place, and time.      Cranial Nerves: No cranial nerve deficit.      Coordination: Coordination normal.   Psychiatric:         Mood and Affect: Mood normal.         Behavior: Behavior normal.         BMI is within normal parameters. No other follow-up for BMI required.      Results Reviewed:  Glucose   Date Value Ref Range Status   09/14/2022 88 65 - 99 mg/dL Final   11/15/2019 148 (H) 65 - 99 mg/dL Final     BUN   Date Value Ref Range Status   09/14/2022 12 8 - 23 mg/dL Final   11/15/2019 13 8 - 23 mg/dL Final     Creatinine   Date Value Ref Range Status   09/14/2022 0.79 0.57 - 1.00 mg/dL Final    11/15/2019 0.74 0.57 - 1.00 mg/dL Final     Sodium   Date Value Ref Range Status   09/14/2022 138 136 - 145 mmol/L Final   11/15/2019 136 136 - 145 mmol/L Final     Potassium   Date Value Ref Range Status   09/14/2022 4.5 3.5 - 5.2 mmol/L Final     Comment:     Slight hemolysis detected by analyzer. Results may be  affected.     11/15/2019 4.9 3.5 - 5.2 mmol/L Final     Chloride   Date Value Ref Range Status   09/14/2022 100 98 - 107 mmol/L Final   11/15/2019 99 98 - 107 mmol/L Final     CO2   Date Value Ref Range Status   11/15/2019 29.0 22.0 - 29.0 mmol/L Final     Total CO2   Date Value Ref Range Status   09/14/2022 26.3 22.0 - 29.0 mmol/L Final     Calcium   Date Value Ref Range Status   09/14/2022 9.8 8.6 - 10.5 mg/dL Final   11/15/2019 9.4 8.6 - 10.5 mg/dL Final     ALT (SGPT)   Date Value Ref Range Status   09/14/2022 16 1 - 33 U/L Final   11/14/2019 12 1 - 33 U/L Final     AST (SGOT)   Date Value Ref Range Status   09/14/2022 23 1 - 32 U/L Final   11/14/2019 16 1 - 32 U/L Final     WBC   Date Value Ref Range Status   03/11/2021 13.66 (H) 3.40 - 10.80 10*3/mm3 Final     Hematocrit   Date Value Ref Range Status   03/11/2021 38.4 34.0 - 46.6 % Final   11/15/2019 34.3 34.0 - 46.6 % Final     Platelets   Date Value Ref Range Status   03/11/2021 344 140 - 450 10*3/mm3 Final   11/15/2019 239 140 - 450 10*3/mm3 Final     Triglycerides   Date Value Ref Range Status   09/14/2022 63 0 - 150 mg/dL Final     HDL Cholesterol   Date Value Ref Range Status   09/14/2022 67 (H) 40 - 60 mg/dL Final     LDL Chol Calc (NIH)   Date Value Ref Range Status   09/14/2022 100 0 - 100 mg/dL Final     Hemoglobin A1C   Date Value Ref Range Status   09/14/2022 5.90 (H) 4.80 - 5.60 % Final     Comment:     Hemoglobin A1C Ranges:  Increased Risk for Diabetes  5.7% to 6.4%  Diabetes                     >= 6.5%  Diabetic Goal                < 7.0%     03/17/2022 5.5 % Final         Assessment / Plan     Assessment/Plan:      1. Essential  hypertension  - The patient will continue her current medications.     2. Prediabetes  - The patient will monitor her sugar and carbohydrate intake.    3. Continued tobacco abuse  - The patient will let us know if she decides to undergo diagnostic testing to evaluate for any underlying issues such as chronic obstructive pulmonary disease, or any emphysematous changes related to smoking.     4. Cough  - She will monitor her sputum production. If it changes significantly, she is going to let us know.   - We also discussed getting a a computed tomography screening of her lungs, however, she has decided not to do any further testing at this point, nor does she want to go see a pulmonologist. She will let us know if she decides to undergo any diagnostic procedures.     4. Encounter for long term current use of other medications  The patient will continue her current medications.   - Compliance Drug Analysis, Ur - Urine, Clean Catch    5. Health maintenance  - She is unable to take the influenza vaccination. We did discuss the signs and symptoms of influenza. If she develops these symptoms, I encouraged her to get tested for influenza in the first 48 hours.   - The patient will follow up in 6 months.     Return in about 6 months (around 3/15/2023). unless patient needs to be seen sooner or acute issues arise.      I have discussed the patient results/orders and and plan/recommendation with them at today's visit.      Geneiss Lopez DO   09/15/2022    Transcribed from ambient dictation for Genesis Lopez DO by Millicent Marino.  09/16/22   06:38 CDT    Patient verbalized consent to the visit recording.

## 2022-09-21 LAB — DRUGS UR: NORMAL

## 2022-11-21 DIAGNOSIS — G47.00 INSOMNIA, UNSPECIFIED TYPE: ICD-10-CM

## 2022-11-21 RX ORDER — TRAZODONE HYDROCHLORIDE 100 MG/1
100 TABLET ORAL NIGHTLY
Qty: 90 TABLET | Refills: 1 | Status: SHIPPED | OUTPATIENT
Start: 2022-11-21

## 2022-11-23 NOTE — TELEPHONE ENCOUNTER
Caller: Elizabeth Carrillo    Relationship: Self    Best call back number: [phone]     What form or medical record are you requesting: WORK EXCUSE FOR 01/12, 01/13, AND 01/14    Who is requesting this form or medical record from you: ELIZABETH CARRILLO     How would you like to receive the form or medical records (pick-up, mail, fax): FAX  If fax, what is the fax number:  [facility] OFF[facility] PHONE # [phone]    Timeframe paperwork needed: BY Monday 01/17 OR Tuesday 01/18    Additional notes: PATIENT STATED THAT SHE WAS DIAGNOSED WITH PNEUMONIA ON 01/11/2022 AND HER WORK IS REQUESTING THIS BE SENT OVER. PLEASE ADVISE.           
Marya is not here to sign this afternoon nor tomorrow, so will try faxing to them on letterhead without signature.  
Ok for work excuse?  
Okay for excuse.   
no back pain, no gout, no musculoskeletal pain, no neck pain, and no weakness.

## 2022-12-06 ENCOUNTER — TELEPHONE (OUTPATIENT)
Dept: INTERNAL MEDICINE | Facility: CLINIC | Age: 62
End: 2022-12-06

## 2022-12-06 DIAGNOSIS — G47.00 INSOMNIA, UNSPECIFIED TYPE: ICD-10-CM

## 2022-12-06 RX ORDER — PRAVASTATIN SODIUM 10 MG
10 TABLET ORAL DAILY
Qty: 90 TABLET | Refills: 3 | Status: CANCELLED | OUTPATIENT
Start: 2022-12-06

## 2022-12-06 RX ORDER — ZOLPIDEM TARTRATE 10 MG/1
TABLET ORAL
Qty: 90 TABLET | Refills: 0 | Status: CANCELLED | OUTPATIENT
Start: 2022-12-06

## 2022-12-06 RX ORDER — LISINOPRIL 40 MG/1
40 TABLET ORAL DAILY
Qty: 90 TABLET | Refills: 3 | Status: CANCELLED | OUTPATIENT
Start: 2022-12-06

## 2022-12-06 NOTE — TELEPHONE ENCOUNTER
Caller: Elizabeth Carrillo TANESHA    Relationship: Self    Best call back number: [phone]    Requested Prescriptions:   Requested Prescriptions     Pending Prescriptions Disp Refills   • zolpidem (AMBIEN) 10 MG tablet 90 tablet 0   • pravastatin (PRAVACHOL) 10 MG tablet 90 tablet 3     Sig: Take 1 tablet by mouth Daily.   • lisinopril (PRINIVIL,ZESTRIL) 40 MG tablet 90 tablet 3     Sig: Take 1 tablet by mouth Daily.        Pharm[facility] where request should be sent: [facility] [address] [phone] [facility] [phone] [place]     Additional details provided by patient: NEEDING TO SEE ABOUT GETTING SOME MEDICATION FOR A COUGH PLEASE NOT A Z-PACK     Does the patient have less than a 3 day supply:  [x] Yes  [] No    Would you like a call back once the refill request has been completed: [x] Yes [] No    If the office needs to give you a call back, can they leave a voicemail: [x] Yes [] No    Sohan Kaminski Rep   12/06/22 12:59 CST

## 2022-12-07 RX ORDER — ZOLPIDEM TARTRATE 10 MG/1
TABLET ORAL
Qty: 90 TABLET | Refills: 1 | Status: SHIPPED | OUTPATIENT
Start: 2022-12-07

## 2022-12-07 NOTE — TELEPHONE ENCOUNTER
Pt stated she did not want to make appt for her cough. She wanted to get meds for that w/out coming in. Informed her if she changed her mind to call us.

## 2022-12-07 NOTE — TELEPHONE ENCOUNTER
Lisinopril and Pravastatin were both refilled to [facility] for #90 x 3 on 6/29/22.  Ambien has already been refilled today, under a separate encounter.

## 2023-01-12 ENCOUNTER — TELEPHONE (OUTPATIENT)
Dept: INTERNAL MEDICINE | Facility: CLINIC | Age: 63
End: 2023-01-12

## 2023-01-12 NOTE — TELEPHONE ENCOUNTER
Caller: Elizabeth Carrillo    Relationship to patient: Self    Best call back number:[phone]    Patient is needing: AN APPOINTMENT BEFORE Monday FOR COUGH, CONGESTION, MUCUS

## 2023-01-13 ENCOUNTER — OFFICE VISIT (OUTPATIENT)
Dept: INTERNAL MEDICINE | Facility: CLINIC | Age: 63
End: 2023-01-13
Payer: COMMERCIAL

## 2023-01-13 VITALS
TEMPERATURE: 96.9 F | WEIGHT: 145 LBS | SYSTOLIC BLOOD PRESSURE: 130 MMHG | BODY MASS INDEX: 22.76 KG/M2 | OXYGEN SATURATION: 99 % | DIASTOLIC BLOOD PRESSURE: 84 MMHG | HEIGHT: 67 IN | HEART RATE: 79 BPM

## 2023-01-13 DIAGNOSIS — J06.9 UPPER RESPIRATORY TRACT INFECTION, UNSPECIFIED TYPE: Primary | ICD-10-CM

## 2023-01-13 DIAGNOSIS — R06.2 WHEEZING: ICD-10-CM

## 2023-01-13 PROCEDURE — 99213 OFFICE O/P EST LOW 20 MIN: CPT

## 2023-01-13 RX ORDER — METHYLPREDNISOLONE 4 MG/1
TABLET ORAL
Qty: 1 TABLET | Refills: 1 | Status: SHIPPED | OUTPATIENT
Start: 2023-01-13 | End: 2023-03-16

## 2023-01-13 RX ORDER — ALBUTEROL SULFATE 0.63 MG/3ML
1 SOLUTION RESPIRATORY (INHALATION) EVERY 6 HOURS PRN
Qty: 30 EACH | Refills: 1 | Status: SHIPPED | OUTPATIENT
Start: 2023-01-13

## 2023-01-13 RX ORDER — DOXYCYCLINE HYCLATE 100 MG/1
100 CAPSULE ORAL 2 TIMES DAILY
Qty: 20 CAPSULE | Refills: 0 | Status: SHIPPED | OUTPATIENT
Start: 2023-01-13 | End: 2023-01-23

## 2023-01-13 RX ORDER — BENZONATATE 100 MG/1
100 CAPSULE ORAL 3 TIMES DAILY PRN
Qty: 45 CAPSULE | Refills: 0 | Status: SHIPPED | OUTPATIENT
Start: 2023-01-13

## 2023-01-13 NOTE — PROGRESS NOTES
Subjective   Elizabeth Carrillo is a 62 y.o. female.   Chief Complaint   Patient presents with   • Cough     Green mucus-productive  Chest hurts from coughing  Started 2 months ago       History of Present Illness   Elizabeth Carrillo presents here today with complaints of continued cough, chest aching from coughing.  She states that this happens to her every year, she has been having symptoms for the last 2 months but for the last week she has had increased mucus production, is thick yellow and green, she states her chest has been aching her energy levels have been low and she feels a burning sensation in her chest.  She is requesting antibiotics to help prevent pneumonia, reports that she is previously had severe pneumonia and wants to avoid this.  She denies any sinus symptoms.  She states that she gets short of breath only with the incessant coughing.  She states that she has been prescribed albuterol inhalers before but they never really worked and she has not used them.  Denies fevers, chills, hemoptysis.  No nausea vomiting or diarrhea.  The following portions of the patient's history were reviewed and updated as appropriate: allergies, current medications, past family history, past medical history, past social history, past surgical history and problem list.    Review of Systems    Objective   Past Medical History:   Diagnosis Date   • ASCUS with positive high risk HPV    • Dysmenorrhea    • Endometrial thickening on ultra sound    • Follow-up examination after gynecological surgery    • Menopausal symptoms    • Menorrhagia    • Pelvic pain    • Vaginitis       Past Surgical History:   Procedure Laterality Date   • APPENDECTOMY N/A 11/14/2019    Procedure: APPENDECTOMY LAPAROSCOPIC POSSIBLE OPEN;  Surgeon: Angeles Gallagher MD;  Location: [facility] OR;  Service: General   • CHOLECYSTECTOMY     • COLONOSCOPY N/A 2/20/2020    Procedure: COLONOSCOPY WITH ANESTHESIA;  Surgeon: Thaddeus Meraz DO;  Location: [facility]  "ENDOSCOPY;  Service: Gastroenterology;  Laterality: N/A;  preop; abdominal pain  postop; diverticulosis   PCP Jason Cuello    • DILATATION AND CURETTAGE      of uterus        Current Outpatient Medications:   •  lisinopril (PRINIVIL,ZESTRIL) 40 MG tablet, Take 1 tablet by mouth Daily., Disp: 90 tablet, Rfl: 3  •  pravastatin (PRAVACHOL) 10 MG tablet, Take 1 tablet by mouth Daily., Disp: 90 tablet, Rfl: 3  •  traZODone (DESYREL) 100 MG tablet, TAKE 1 TABLET BY MOUTH EVERY NIGHT., Disp: 90 tablet, Rfl: 1  •  zolpidem (AMBIEN) 10 MG tablet, TAKE ONE TABLET BY MOUTH EVERY BEDTIME AS NEEDED **MAY MAKE DROWSY**, Disp: 90 tablet, Rfl: 1  •  albuterol (ACCUNEB) 0.63 MG/3ML nebulizer solution, Take 3 mL by nebulization Every 6 (Six) Hours As Needed for Wheezing or Shortness of Air., Disp: 30 each, Rfl: 1  •  benzonatate (Tessalon Perles) 100 MG capsule, Take 1 capsule by mouth 3 (Three) Times a Day As Needed for Cough., Disp: 45 capsule, Rfl: 0  •  doxycycline (VIBRAMYCIN) 100 MG capsule, Take 1 capsule by mouth 2 (Two) Times a Day for 10 days., Disp: 20 capsule, Rfl: 0  •  methylPREDNISolone (MEDROL) 4 MG dose pack, Take as directed on package instructions., Disp: 1 tablet, Rfl: 1      /84 (BP Location: Left arm, Patient Position: Sitting, Cuff Size: Adult)   Pulse 79   Temp 96.9 °F (36.1 °C) (Temporal)   Ht 170.2 cm (67\")   Wt 65.8 kg (145 lb)   SpO2 99%   BMI 22.71 kg/m²      Body mass index is 22.71 kg/m².  BMI is within normal parameters. No other follow-up for BMI required.       Physical Exam  Vitals and nursing note reviewed.   Constitutional:       Appearance: Normal appearance. She is ill-appearing.   HENT:      Head: Normocephalic and atraumatic.   Eyes:      Extraocular Movements: Extraocular movements intact.      Conjunctiva/sclera: Conjunctivae normal.      Pupils: Pupils are equal, round, and reactive to light.   Cardiovascular:      Rate and Rhythm: Normal rate and regular rhythm.      Pulses: " Normal pulses.      Heart sounds: Normal heart sounds.   Pulmonary:      Effort: Pulmonary effort is normal. No respiratory distress.      Breath sounds: No stridor. Wheezing and rales present. No rhonchi.   Chest:      Chest wall: Tenderness present.   Abdominal:      General: Bowel sounds are normal.      Palpations: Abdomen is soft.   Musculoskeletal:         General: Normal range of motion.      Cervical back: Normal range of motion and neck supple.   Skin:     General: Skin is warm and dry.   Neurological:      General: No focal deficit present.      Mental Status: She is alert and oriented to person, place, and time. Mental status is at baseline.   Psychiatric:         Mood and Affect: Mood normal.         Behavior: Behavior normal.         Thought Content: Thought content normal.         Judgment: Judgment normal.               Assessment & Plan   Diagnoses and all orders for this visit:    1. Upper respiratory tract infection, unspecified type (Primary)  -     doxycycline (VIBRAMYCIN) 100 MG capsule; Take 1 capsule by mouth 2 (Two) Times a Day for 10 days.  Dispense: 20 capsule; Refill: 0  -     methylPREDNISolone (MEDROL) 4 MG dose pack; Take as directed on package instructions.  Dispense: 1 tablet; Refill: 1  -     benzonatate (Tessalon Perles) 100 MG capsule; Take 1 capsule by mouth 3 (Three) Times a Day As Needed for Cough.  Dispense: 45 capsule; Refill: 0    2. Wheezing  -     Home Nebulizer  -     albuterol (ACCUNEB) 0.63 MG/3ML nebulizer solution; Take 3 mL by nebulization Every 6 (Six) Hours As Needed for Wheezing or Shortness of Air.  Dispense: 30 each; Refill: 1               Plan of care reviewed with Mrs. Carrillo  She is going to start taking vitamin supplementation, vitamin C, D and zinc, will drink adequate amounts of water.  She will reach out if symptoms do not improve in the next 48 to 72 hours and or if symptoms increase in severity.  Keep next scheduled appointment, can be seen before this  as needed.

## 2023-03-16 ENCOUNTER — HOSPITAL ENCOUNTER (OUTPATIENT)
Dept: GENERAL RADIOLOGY | Facility: HOSPITAL | Age: 63
Discharge: HOME | End: 2023-03-16
Admitting: INTERNAL MEDICINE
Payer: COMMERCIAL

## 2023-03-16 ENCOUNTER — OFFICE VISIT (OUTPATIENT)
Dept: INTERNAL MEDICINE | Facility: CLINIC | Age: 63
End: 2023-03-16
Payer: COMMERCIAL

## 2023-03-16 VITALS
SYSTOLIC BLOOD PRESSURE: 122 MMHG | OXYGEN SATURATION: 100 % | HEART RATE: 85 BPM | BODY MASS INDEX: 22.29 KG/M2 | TEMPERATURE: 97.3 F | HEIGHT: 67 IN | DIASTOLIC BLOOD PRESSURE: 68 MMHG | WEIGHT: 142 LBS

## 2023-03-16 DIAGNOSIS — J41.0 SIMPLE CHRONIC BRONCHITIS: ICD-10-CM

## 2023-03-16 DIAGNOSIS — J06.9 ACUTE UPPER RESPIRATORY INFECTION: Primary | ICD-10-CM

## 2023-03-16 DIAGNOSIS — R73.03 PREDIABETES: ICD-10-CM

## 2023-03-16 DIAGNOSIS — R05.3 CHRONIC COUGH: ICD-10-CM

## 2023-03-16 DIAGNOSIS — J06.9 ACUTE UPPER RESPIRATORY INFECTION: ICD-10-CM

## 2023-03-16 DIAGNOSIS — I10 ESSENTIAL HYPERTENSION: ICD-10-CM

## 2023-03-16 DIAGNOSIS — Z72.0 TOBACCO ABUSE: ICD-10-CM

## 2023-03-16 PROCEDURE — 71046 X-RAY EXAM CHEST 2 VIEWS: CPT

## 2023-03-16 PROCEDURE — 96372 THER/PROPH/DIAG INJ SC/IM: CPT | Performed by: INTERNAL MEDICINE

## 2023-03-16 PROCEDURE — 99214 OFFICE O/P EST MOD 30 MIN: CPT | Performed by: INTERNAL MEDICINE

## 2023-03-16 RX ORDER — AZITHROMYCIN 250 MG/1
TABLET, FILM COATED ORAL
Qty: 6 TABLET | Refills: 0 | Status: SHIPPED | OUTPATIENT
Start: 2023-03-16

## 2023-03-16 RX ORDER — CETIRIZINE HYDROCHLORIDE 10 MG/1
10 TABLET ORAL DAILY
COMMUNITY

## 2023-03-16 RX ORDER — METHYLPREDNISOLONE SODIUM SUCCINATE 40 MG/ML
40 INJECTION, POWDER, LYOPHILIZED, FOR SOLUTION INTRAMUSCULAR; INTRAVENOUS ONCE
Status: COMPLETED | OUTPATIENT
Start: 2023-03-16 | End: 2023-03-16

## 2023-03-16 RX ORDER — PREDNISONE 10 MG/1
TABLET ORAL
Qty: 30 TABLET | Refills: 0 | Status: SHIPPED | OUTPATIENT
Start: 2023-03-16 | End: 2023-03-28

## 2023-03-16 RX ADMIN — METHYLPREDNISOLONE SODIUM SUCCINATE 40 MG: 40 INJECTION, POWDER, LYOPHILIZED, FOR SOLUTION INTRAMUSCULAR; INTRAVENOUS at 15:44

## 2023-03-17 PROBLEM — Z72.0 TOBACCO ABUSE: Status: ACTIVE | Noted: 2023-03-17

## 2023-03-17 PROBLEM — J42 CHRONIC BRONCHITIS (HCC): Status: ACTIVE | Noted: 2023-03-17

## 2023-03-17 PROBLEM — K35.80 ACUTE APPENDICITIS: Status: RESOLVED | Noted: 2019-11-14 | Resolved: 2023-03-17

## 2023-03-17 NOTE — PROGRESS NOTES
"      Chief Complaint  Hypertension (6 month follow up ) and Sinusitis (Still having issues with productive cough and congestion )    Subjective        Elizabeth Carrillo presents to [facility]    HPI    Patient presents for follow-up.  Patient's blood pressure well controlled.  Patient tolerating the lisinopril as well as slowly increasing activity.  Patient continues to smoke.  We discussed smoking cessation.  Patient does not have any interest at this time.    Patient meets criteria for chronic bronchitis:      Review of Systems   Constitutional: Negative for activity change and appetite change.   Respiratory: Positive for cough. Negative for shortness of breath.    Cardiovascular: Negative for chest pain and palpitations.   Gastrointestinal: Negative for abdominal distention, abdominal pain, constipation, diarrhea and nausea.       Objective   Vital Signs:  /68 (BP Location: Left arm, Patient Position: Sitting, Cuff Size: Adult)   Pulse 85   Temp 97.3 °F (36.3 °C) (Temporal)   Ht 170.2 cm (67\")   Wt 64.4 kg (142 lb)   SpO2 100%   BMI 22.24 kg/m²   Estimated body mass index is 22.24 kg/m² as calculated from the following:    Height as of this encounter: 170.2 cm (67\").    Weight as of this encounter: 64.4 kg (142 lb).      Physical Exam  Vitals reviewed.   Constitutional:       Appearance: She is not ill-appearing.   HENT:      Head: Normocephalic and atraumatic.      Mouth/Throat:      Mouth: Mucous membranes are dry.      Pharynx: Oropharynx is clear.   Eyes:      General: No scleral icterus.     Conjunctiva/sclera: Conjunctivae normal.   Cardiovascular:      Rate and Rhythm: Normal rate and regular rhythm.   Pulmonary:      Effort: Pulmonary effort is normal.      Breath sounds: No wheezing.      Comments: Upper airway congestion  Skin:     General: Skin is warm and dry.      Coloration: Skin is not jaundiced or pale.   Neurological:      General: No focal deficit present. "      Mental Status: She is alert and oriented to person, place, and time.   Psychiatric:         Mood and Affect: Mood normal.         Behavior: Behavior normal.                     Assessment and Plan   Diagnoses and all orders for this visit:    1. Acute upper respiratory infection (Primary)  -     XR Chest PA & Lateral; Future  -     Discontinue: ipratropium (ATROVENT HFA) 17 MCG/ACT inhaler; Inhale 2 puffs 4 (Four) Times a Day.  Dispense: 12.9 g; Refill: 2  -     predniSONE (DELTASONE) 10 MG tablet; Take 4 tablets by mouth Daily for 3 days, THEN 3 tablets Daily for 3 days, THEN 2 tablets Daily for 3 days, THEN 1 tablet Daily for 3 days.  Dispense: 30 tablet; Refill: 0  -     ipratropium (ATROVENT HFA) 17 MCG/ACT inhaler; Inhale 2 puffs 4 (Four) Times a Day.  Dispense: 12.9 g; Refill: 2    2. Chronic cough  -     OSCILLATING POSITIVE EXIRATORY PRESSURE (OPEP); Future  -     azithromycin (Zithromax Z-Moshe) 250 MG tablet; Take 2 tablets by mouth on day 1, then 1 tablet daily on days 2-5  Dispense: 6 tablet; Refill: 0  -     predniSONE (DELTASONE) 10 MG tablet; Take 4 tablets by mouth Daily for 3 days, THEN 3 tablets Daily for 3 days, THEN 2 tablets Daily for 3 days, THEN 1 tablet Daily for 3 days.  Dispense: 30 tablet; Refill: 0  -     ipratropium (ATROVENT HFA) 17 MCG/ACT inhaler; Inhale 2 puffs 4 (Four) Times a Day.  Dispense: 12.9 g; Refill: 2  -     methylPREDNISolone sodium succinate (SOLU-Medrol) injection 40 mg    3. Prediabetes    4. Essential hypertension      For chronic cough, flutter valve, azithromycin (more for antiinflammatory properties), steroid taper, Atrovent (if not too expensive).  Will get a CXR to rule out PNA as well as look for signs of contraction bronchiectasis (CT would be better, but will hold for now).      Patient would benefit from PFTs in the future.    Recommend increasing activity    BP well controlled continue current regimen.        Result Review :  The following data was  reviewed by: Delroy Larson MD on 03/16/2023:  CMP    CMP 9/14/22   Glucose 88   BUN 12   Creatinine 0.79   Sodium 138   Potassium 4.5   Chloride 100   Calcium 9.8   Total Protein 7.3   Albumin 4.90   Globulin 2.4   Total Bilirubin 0.3   Alkaline Phosphatase 85   AST (SGOT) 23   ALT (SGPT) 16   BUN/Creatinine Ratio 15.2      Comments are available for some flowsheets but are not being displayed.             Lipid Panel    Lipid Panel 9/14/22   Total Cholesterol 179   Triglycerides 63   HDL Cholesterol 67 (A)   VLDL Cholesterol 12   LDL Cholesterol  100   (A) Abnormal value       Comments are available for some flowsheets but are not being displayed.               A1C Last 3 Results    HGBA1C Last 3 Results 9/14/22   Hemoglobin A1C 5.90 (A)   (A) Abnormal value       Comments are available for some flowsheets but are not being displayed.                BMI is within normal parameters. No other follow-up for BMI required.           Follow Up   Return in about 6 months (around 9/16/2023), or if symptoms worsen or fail to improve, for Recheck.  Patient was given instructions and counseling regarding her condition or for health maintenance advice. Please see specific information pulled into the AVS if appropriate.       TEENA Larson MD, FACP, FH      Electronically signed by Delroy Larson MD, 03/17/23, 9:47 AM CDT.

## 2023-05-22 DIAGNOSIS — G47.00 INSOMNIA, UNSPECIFIED TYPE: ICD-10-CM

## 2023-05-22 RX ORDER — TRAZODONE HYDROCHLORIDE 100 MG/1
100 TABLET ORAL NIGHTLY
Qty: 90 TABLET | Refills: 1 | Status: SHIPPED | OUTPATIENT
Start: 2023-05-22

## 2023-06-05 DIAGNOSIS — G47.00 INSOMNIA, UNSPECIFIED TYPE: ICD-10-CM

## 2023-06-05 RX ORDER — ZOLPIDEM TARTRATE 10 MG/1
TABLET ORAL
Qty: 90 TABLET | Refills: 1 | Status: SHIPPED | OUTPATIENT
Start: 2023-06-05

## 2023-08-29 RX ORDER — LISINOPRIL 40 MG/1
40 TABLET ORAL DAILY
Qty: 90 TABLET | Refills: 3 | Status: SHIPPED | OUTPATIENT
Start: 2023-08-29

## 2023-08-29 RX ORDER — LISINOPRIL 40 MG/1
40 TABLET ORAL DAILY
Qty: 90 TABLET | Refills: 3 | OUTPATIENT
Start: 2023-08-29

## 2023-09-14 ENCOUNTER — OFFICE VISIT (OUTPATIENT)
Dept: INTERNAL MEDICINE | Facility: CLINIC | Age: 63
End: 2023-09-14
Payer: COMMERCIAL

## 2023-09-14 VITALS
TEMPERATURE: 97.8 F | HEART RATE: 74 BPM | WEIGHT: 150 LBS | HEIGHT: 67 IN | SYSTOLIC BLOOD PRESSURE: 104 MMHG | OXYGEN SATURATION: 99 % | DIASTOLIC BLOOD PRESSURE: 60 MMHG | BODY MASS INDEX: 23.54 KG/M2

## 2023-09-14 DIAGNOSIS — I10 ESSENTIAL HYPERTENSION: ICD-10-CM

## 2023-09-14 DIAGNOSIS — R73.03 PREDIABETES: ICD-10-CM

## 2023-09-14 DIAGNOSIS — J41.0 SIMPLE CHRONIC BRONCHITIS: Primary | ICD-10-CM

## 2023-09-14 DIAGNOSIS — Z72.0 TOBACCO ABUSE: ICD-10-CM

## 2023-09-14 DIAGNOSIS — Z00.00 WELLNESS EXAMINATION: ICD-10-CM

## 2023-09-14 RX ORDER — FLUTICASONE PROPIONATE 50 MCG
2 SPRAY, SUSPENSION (ML) NASAL DAILY
Qty: 9.9 ML | Refills: 2 | Status: SHIPPED | OUTPATIENT
Start: 2023-09-14

## 2023-09-16 NOTE — PROGRESS NOTES
"      Chief Complaint  Annual Exam, moles (Pt has a few moles on her face and neck that she would like you to look at today ), pneumo vaccine, Cough (Ongoing issue ), Insomnia (Takes Ambien about 10 then sleep 3-4 but is very restless all night ), and flu vaccine not wanted     Subjective        Elizabeth Carrillo presents to [facility]    HPI  Patient here for the above problems.  See Assessment and Plan for further HPI components.        Review of Systems    Objective   Vital Signs:  /60 (BP Location: Left arm, Patient Position: Sitting, Cuff Size: Adult)   Pulse 74   Temp 97.8 °F (36.6 °C) (Temporal)   Ht 170.2 cm (67\")   Wt 68 kg (150 lb)   SpO2 99%   BMI 23.49 kg/m²   Estimated body mass index is 23.49 kg/m² as calculated from the following:    Height as of this encounter: 170.2 cm (67\").    Weight as of this encounter: 68 kg (150 lb).      Physical Exam  Vitals reviewed.   Constitutional:       Appearance: She is not ill-appearing.   HENT:      Head: Normocephalic and atraumatic.   Eyes:      General: No scleral icterus.  Cardiovascular:      Rate and Rhythm: Normal rate and regular rhythm.   Pulmonary:      Effort: Pulmonary effort is normal. No respiratory distress.   Skin:     General: Skin is warm.      Coloration: Skin is not pale.   Neurological:      General: No focal deficit present.      Mental Status: She is alert and oriented to person, place, and time.   Psychiatric:         Mood and Affect: Mood normal.         Behavior: Behavior normal.                       Assessment and Plan   Diagnoses and all orders for this visit:    1. Simple chronic bronchitis (Primary)    2. Wellness examination  -     CBC & Differential  -     Comprehensive Metabolic Panel  -     Urinalysis With Microscopic - Urine, Clean Catch  -     Lipid Panel  -     TSH Rfx On Abnormal To Free T4  -     ToxASSURE Select 13 (MW) - Urine, Clean Catch    3. Essential hypertension    4. " Prediabetes  -     Hemoglobin A1c    5. Tobacco abuse    Other orders  -     fluticasone (FLONASE) 50 MCG/ACT nasal spray; 2 sprays into the nostril(s) as directed by provider Daily.  Dispense: 9.9 mL; Refill: 2        Recommend and counseled pneumonia, COVID, and flu vaccination.  Patient declines.  Patient declines lung cancer screening.  Patient can consider getting set up with OBGYN or with one of our APRNs for pap smears, it looks like it is due.  Recommend 150 minutes of exercise weekly  Recommend well balanced diet    Annual labs today  Counseled on smoking cessation  Patient has chronic cough, discussed CT would be helpful.  She declines.    For allergic rhinitis and the cough, I think flonase would be helpful.  Will send in.    Health Maintenance Summary            Overdue - Pneumococcal Vaccine 0-64 (1 - PCV) Overdue - never done      No completion, postpone, or frequency change history exists for this topic.              Overdue - ZOSTER VACCINE (1 of 2) Overdue - never done      No completion, postpone, or frequency change history exists for this topic.              Overdue - LUNG CANCER SCREENING (Yearly) Overdue since 1/14/2020 01/14/2019  Outside Claim: [facility] CAT SCAN OF CHEST CONTRAST              Overdue - COVID-19 Vaccine (3 - Moderna series) Overdue since 6/7/2021 04/12/2021  Imm Admin: COVID-19 (MODERNA) 1st, 2nd, 3rd Dose Only    03/15/2021  Imm Admin: COVID-19 (MODERNA) 1st, 2nd, 3rd Dose Only              Overdue - PAP SMEAR (Every 3 Years) Overdue since 1/31/2023 01/31/2020  Pap IG, Rfx HPV All Pth    11/13/2019  Liquid-based Pap Smear, Screening    11/13/2019  HPV DNA Probe, Direct - ThinPrep Vial, Cervix    02/22/2017  Liquid-based Pap Smear, Screening              Postponed - MAMMOGRAM (Every 2 Years) Postponed until 9/14/2026 09/14/2023  Postponed until 9/14/2026 by Shanika Ellison MA (Patient Refused)    03/17/2022  Postponed until 3/17/2023 by Shanika Ellison  ANTONINA Lopez (Patient Refused)    08/19/2020  Postponed until 8/19/2021 by Shanika Ellison MA (Patient Refused - pt states she is not having a problem and doesnt want)    01/31/2020  Postponed until 1/31/2020 by Priscilla Gibbs MA (Pending event)              INFLUENZA VACCINE (Yearly - October to March) Next due on 10/1/2023      03/17/2022  Postponed until 3/17/2024 by Shanika Ellison MA (Patient Refused)    09/10/2020  Postponed until 9/10/2021 by Shanika Ellison MA (Patient Refused)    01/31/2020  Declined              ANNUAL PHYSICAL (Yearly) Next due on 9/14/2024 09/14/2023  Done    03/17/2022  Registry Metric: Last Annual Physical    01/31/2020  Done              TDAP/TD VACCINES (2 - Td or Tdap) Next due on 12/8/2025 12/08/2015  Imm Admin: Tdap              COLORECTAL CANCER SCREENING (COLONOSCOPY - Every 10 Years) Next due on 2/20/2030 02/20/2020  Surgical Procedure: COLONOSCOPY    02/20/2020  COLONOSCOPY    02/20/2020  SCANNED - COLONOSCOPY    01/31/2020  Postponed until 2/21/2020 by Priscilla Gibbs MA (Pending event)              HEPATITIS C SCREENING  Completed      03/11/2021  Hep C Virus Ab component of Hepatitis C Antibody                      For patients insomnia, we discussed other ages.  She would like to stick with ambien for now.  CSA, UDS updated as needed.  PDMP reviewed.    Will keep an eye on skin lesions     Result Review :  The following data was reviewed by: Delroy Larson MD on 09/14/2023:                       BMI is within normal parameters. No other follow-up for BMI required.      BMI is within normal parameters. No other follow-up for BMI required.            Follow Up   Return in about 6 months (around 3/14/2024), or if symptoms worsen or fail to improve, for Recheck.  Patient was given instructions and counseling regarding her condition or for health maintenance advice. Please see specific information pulled into the AVS if appropriate.        TEENA Larson MD, FACP, FH      Electronically signed by Delroy Larson MD, 09/16/23, 11:51 AM CDT.            Provided in AVS:

## 2023-09-20 LAB
ALBUMIN SERPL-MCNC: 5 G/DL (ref 3.5–5.2)
ALBUMIN/GLOB SERPL: 2 G/DL
ALP SERPL-CCNC: 92 U/L (ref 39–117)
ALT SERPL-CCNC: 18 U/L (ref 1–33)
APPEARANCE UR: CLEAR
AST SERPL-CCNC: 19 U/L (ref 1–32)
BACTERIA #/AREA URNS HPF: NORMAL /HPF
BASOPHILS # BLD AUTO: 0.03 10*3/MM3 (ref 0–0.2)
BASOPHILS NFR BLD AUTO: 0.3 % (ref 0–1.5)
BILIRUB SERPL-MCNC: <0.2 MG/DL (ref 0–1.2)
BILIRUB UR QL STRIP: NEGATIVE
BUN SERPL-MCNC: 12 MG/DL (ref 8–23)
BUN/CREAT SERPL: 14 (ref 7–25)
CALCIUM SERPL-MCNC: 10 MG/DL (ref 8.6–10.5)
CASTS URNS MICRO: NORMAL
CHLORIDE SERPL-SCNC: 99 MMOL/L (ref 98–107)
CHOLEST SERPL-MCNC: 169 MG/DL (ref 0–200)
CO2 SERPL-SCNC: 28.8 MMOL/L (ref 22–29)
COLOR UR: YELLOW
CREAT SERPL-MCNC: 0.86 MG/DL (ref 0.57–1)
DRUGS UR: NORMAL
EGFRCR SERPLBLD CKD-EPI 2021: 75.5 ML/MIN/1.73
EOSINOPHIL # BLD AUTO: 0.16 10*3/MM3 (ref 0–0.4)
EOSINOPHIL NFR BLD AUTO: 1.5 % (ref 0.3–6.2)
EPI CELLS #/AREA URNS HPF: NORMAL /HPF
ERYTHROCYTE [DISTWIDTH] IN BLOOD BY AUTOMATED COUNT: 13.2 % (ref 12.3–15.4)
GLOBULIN SER CALC-MCNC: 2.5 GM/DL
GLUCOSE SERPL-MCNC: 74 MG/DL (ref 65–99)
GLUCOSE UR QL STRIP: NEGATIVE
HBA1C MFR BLD: 6 % (ref 4.8–5.6)
HCT VFR BLD AUTO: 41.1 % (ref 34–46.6)
HDLC SERPL-MCNC: 57 MG/DL (ref 40–60)
HGB BLD-MCNC: 13.7 G/DL (ref 12–15.9)
HGB UR QL STRIP: NEGATIVE
IMM GRANULOCYTES # BLD AUTO: 0.03 10*3/MM3 (ref 0–0.05)
IMM GRANULOCYTES NFR BLD AUTO: 0.3 % (ref 0–0.5)
KETONES UR QL STRIP: NEGATIVE
LDLC SERPL CALC-MCNC: 89 MG/DL (ref 0–100)
LEUKOCYTE ESTERASE UR QL STRIP: NEGATIVE
LYMPHOCYTES # BLD AUTO: 3.95 10*3/MM3 (ref 0.7–3.1)
LYMPHOCYTES NFR BLD AUTO: 35.9 % (ref 19.6–45.3)
MCH RBC QN AUTO: 31.2 PG (ref 26.6–33)
MCHC RBC AUTO-ENTMCNC: 33.3 G/DL (ref 31.5–35.7)
MCV RBC AUTO: 93.6 FL (ref 79–97)
MONOCYTES # BLD AUTO: 1.14 10*3/MM3 (ref 0.1–0.9)
MONOCYTES NFR BLD AUTO: 10.4 % (ref 5–12)
NEUTROPHILS # BLD AUTO: 5.69 10*3/MM3 (ref 1.7–7)
NEUTROPHILS NFR BLD AUTO: 51.6 % (ref 42.7–76)
NITRITE UR QL STRIP: NEGATIVE
NRBC BLD AUTO-RTO: 0 /100 WBC (ref 0–0.2)
PH UR STRIP: 7.5 [PH] (ref 5–8)
PLATELET # BLD AUTO: 339 10*3/MM3 (ref 140–450)
POTASSIUM SERPL-SCNC: 4.4 MMOL/L (ref 3.5–5.2)
PROT SERPL-MCNC: 7.5 G/DL (ref 6–8.5)
PROT UR QL STRIP: NEGATIVE
RBC # BLD AUTO: 4.39 10*6/MM3 (ref 3.77–5.28)
RBC #/AREA URNS HPF: NORMAL /HPF
SODIUM SERPL-SCNC: 138 MMOL/L (ref 136–145)
SP GR UR STRIP: 1.02 (ref 1–1.03)
TRIGL SERPL-MCNC: 131 MG/DL (ref 0–150)
TSH SERPL DL<=0.005 MIU/L-ACNC: 2.16 UIU/ML (ref 0.27–4.2)
UROBILINOGEN UR STRIP-MCNC: NORMAL MG/DL
VLDLC SERPL CALC-MCNC: 23 MG/DL (ref 5–40)
WBC # BLD AUTO: 11 10*3/MM3 (ref 3.4–10.8)
WBC #/AREA URNS HPF: NORMAL /HPF

## 2023-11-14 ENCOUNTER — TELEPHONE (OUTPATIENT)
Dept: INTERNAL MEDICINE | Facility: CLINIC | Age: 63
End: 2023-11-14

## 2023-11-14 ENCOUNTER — OFFICE VISIT (OUTPATIENT)
Dept: INTERNAL MEDICINE | Facility: CLINIC | Age: 63
End: 2023-11-14
Payer: COMMERCIAL

## 2023-11-14 VITALS
OXYGEN SATURATION: 97 % | HEART RATE: 84 BPM | WEIGHT: 154 LBS | BODY MASS INDEX: 24.17 KG/M2 | DIASTOLIC BLOOD PRESSURE: 86 MMHG | RESPIRATION RATE: 18 BRPM | SYSTOLIC BLOOD PRESSURE: 140 MMHG | HEIGHT: 67 IN | TEMPERATURE: 98 F

## 2023-11-14 DIAGNOSIS — G47.00 INSOMNIA, UNSPECIFIED TYPE: ICD-10-CM

## 2023-11-14 DIAGNOSIS — J06.9 UPPER RESPIRATORY TRACT INFECTION, UNSPECIFIED TYPE: Primary | ICD-10-CM

## 2023-11-14 RX ORDER — GUAIFENESIN AND DEXTROMETHORPHAN HYDROBROMIDE 1200; 60 MG/1; MG/1
1 TABLET, EXTENDED RELEASE ORAL 2 TIMES DAILY
COMMUNITY

## 2023-11-14 RX ORDER — BENZONATATE 100 MG/1
100 CAPSULE ORAL 3 TIMES DAILY PRN
Qty: 30 CAPSULE | Refills: 0 | Status: SHIPPED | OUTPATIENT
Start: 2023-11-14

## 2023-11-14 RX ORDER — AMOXICILLIN AND CLAVULANATE POTASSIUM 875; 125 MG/1; MG/1
1 TABLET, FILM COATED ORAL 2 TIMES DAILY
Qty: 14 TABLET | Refills: 0 | Status: SHIPPED | OUTPATIENT
Start: 2023-11-14 | End: 2023-11-21

## 2023-11-14 RX ORDER — TRAZODONE HYDROCHLORIDE 100 MG/1
100 TABLET ORAL NIGHTLY
Qty: 90 TABLET | Refills: 1 | Status: SHIPPED | OUTPATIENT
Start: 2023-11-14

## 2023-11-14 RX ORDER — TRIAMCINOLONE ACETONIDE 40 MG/ML
40 INJECTION, SUSPENSION INTRA-ARTICULAR; INTRAMUSCULAR ONCE
Status: COMPLETED | OUTPATIENT
Start: 2023-11-14 | End: 2023-11-14

## 2023-11-14 RX ADMIN — TRIAMCINOLONE ACETONIDE 40 MG: 40 INJECTION, SUSPENSION INTRA-ARTICULAR; INTRAMUSCULAR at 12:05

## 2023-11-14 NOTE — PROGRESS NOTES
Subjective     Chief Complaint:  Cough    HPI:  Patient presents today with complaints of a cough.  She states that she first became sick about 10 days ago.  She reports that her entire family was sick but none of them tested positive for COVID.  She reports having a lingering cough and her voice has become hoarse over the last 24 hours.  She is coughing up green sputum but she feels that it is mostly coming from her throat rather than her chest.  She does feel that it is worked its way down from her nose to her throat.  She denies fever or shortness of air.  She has been taking Mucinex twice daily along with Zyrtec which have provided minimal relief.      Past Medical History:   Past Medical History:   Diagnosis Date    ASCUS with positive high risk HPV     Dysmenorrhea     Endometrial thickening on ultra sound     Follow-up examination after gynecological surgery     Menopausal symptoms     Menorrhagia     Pelvic pain     Vaginitis      Past Surgical History:  Past Surgical History:   Procedure Laterality Date    APPENDECTOMY N/A 11/14/2019    Procedure: APPENDECTOMY LAPAROSCOPIC POSSIBLE OPEN;  Surgeon: Angeles Gallagher MD;  Location: [facility] OR;  Service: General    CHOLECYSTECTOMY      COLONOSCOPY N/A 2/20/2020    Procedure: COLONOSCOPY WITH ANESTHESIA;  Surgeon: Thaddeus Meraz DO;  Location: [facility] ENDOSCOPY;  Service: Gastroenterology;  Laterality: N/A;  preop; abdominal pain  postop; diverticulosis   PCP Jason Cuello     DILATATION AND CURETTAGE      of uterus       Allergies:  Allergies   Allergen Reactions    Codeine Nausea And Vomiting     Medications:  Prior to Admission medications    Medication Sig Start Date End Date Taking? Authorizing Provider   cetirizine (zyrTEC) 10 MG tablet Take 1 tablet by mouth Daily.   Yes Provider, MD Jose   Dextromethorphan-guaiFENesin (Mucinex DM Maximum Strength)  MG tablet sustained-release 12 hour Take 1 tablet by mouth 2 (Two) Times a Day.    "Yes Provider, MD Jose   fluticasone (FLONASE) 50 MCG/ACT nasal spray 2 sprays into the nostril(s) as directed by provider Daily. 9/14/23  Yes Delroy Larson MD   lisinopril (PRINIVIL,ZESTRIL) 40 MG tablet TAKE 1 TABLET BY MOUTH DAILY. 8/29/23  Yes Delroy Larson MD   pravastatin (PRAVACHOL) 10 MG tablet TAKE 1 TABLET BY MOUTH DAILY. 6/22/23  Yes Delroy Larson MD   traZODone (DESYREL) 100 MG tablet TAKE 1 TABLET BY MOUTH EVERY NIGHT. 5/22/23  Yes Anali Baca APRN   zolpidem (AMBIEN) 10 MG tablet TAKE ONE TABLET BY MOUTH EVERY BEDTIME AS NEEDED **MAY MAKE DROWSY** 6/5/23  Yes Delroy Larson MD       Objective     Vital Signs: /86 (BP Location: Left arm, Patient Position: Sitting, Cuff Size: Adult)   Pulse 84   Temp 98 °F (36.7 °C) (Infrared)   Resp 18   Ht 170.2 cm (67\")   Wt 69.9 kg (154 lb)   SpO2 97%   BMI 24.12 kg/m²   Physical Exam  Vitals and nursing note reviewed.   Constitutional:       General: She is not in acute distress.     Appearance: Normal appearance.   HENT:      Right Ear: Tympanic membrane normal.      Left Ear: Tympanic membrane normal.      Nose: Congestion present.      Mouth/Throat:      Pharynx: Posterior oropharyngeal erythema present. No oropharyngeal exudate.   Cardiovascular:      Rate and Rhythm: Normal rate.      Pulses: Normal pulses.      Heart sounds: Normal heart sounds.   Pulmonary:      Effort: Pulmonary effort is normal. No respiratory distress.      Breath sounds: Normal breath sounds. No wheezing.      Comments: Hoarse, congested cough  Abdominal:      General: Bowel sounds are normal. There is no distension.      Palpations: Abdomen is soft.      Tenderness: There is no abdominal tenderness.   Musculoskeletal:         General: Normal range of motion.      Cervical back: Normal range of motion.   Lymphadenopathy:      Cervical: No cervical adenopathy.   Skin:     General: Skin is warm and dry.      Capillary Refill: Capillary " refill takes less than 2 seconds.      Findings: No bruising, erythema or rash.   Neurological:      Mental Status: She is alert and oriented to person, place, and time. Mental status is at baseline.      Motor: No weakness.       Results Reviewed:  Reviewed renal function on CMP obtained on 9/14/2021.    Assessment / Plan     Assessment/Plan:  Diagnoses and all orders for this visit:    1. Upper respiratory tract infection, unspecified type (Primary)  -     amoxicillin-clavulanate (AUGMENTIN) 875-125 MG per tablet; Take 1 tablet by mouth 2 (Two) Times a Day for 7 days.  Dispense: 14 tablet; Refill: 0  -     triamcinolone acetonide (KENALOG-40) injection 40 mg  -     benzonatate (Tessalon Perles) 100 MG capsule; Take 1 capsule by mouth 3 (Three) Times a Day As Needed for Cough.  Dispense: 30 capsule; Refill: 0       No need for testing in the clinic today since her symptoms have been present for 10 days.  Since she does have green sputum production and her symptoms have persisted, will treat with Augmentin.  She will also receive Kenalog 40 mg injection in the clinic today.  She will be given Tessalon Perles to be used 3 times daily as needed.  She may continue taking Mucinex and Zyrtec.  Encouraged patient to call if her symptoms worsen or fail to improve.    Return for Next scheduled follow up. unless patient needs to be seen sooner or acute issues arise.    I have discussed the patient results/orders and and plan/recommendation with them at today's visit.      Brittney Cid, APRN   11/14/2023

## 2023-11-14 NOTE — TELEPHONE ENCOUNTER
Caller: Elizabeth Carrillo    Relationship: Self    Best call back number: [phone]     What medication are you requesting: AMOXICILLIN OR ANYTHING TO HELP      What are your current symptoms: COUGH,CONGESTION,HOARSE     How long have you been experiencing symptoms: COUPLE DAYS     Have you had these symptoms before:    [] Yes  [x] No    Have you been treated for these symptoms before:   [] Yes  [x] No    If a prescription is needed, what is your preferred pharmacy and phone number: [facility] - [address] [phone] [facility] [phone]        SHE'S BEEN USING FLONASE     
Patient scheduled  
Durable Medical Equipment

## 2023-11-21 DIAGNOSIS — G47.00 INSOMNIA, UNSPECIFIED TYPE: ICD-10-CM

## 2023-11-21 RX ORDER — ZOLPIDEM TARTRATE 10 MG/1
TABLET ORAL
Qty: 45 TABLET | Refills: 1 | Status: SHIPPED | OUTPATIENT
Start: 2023-11-29

## 2023-11-21 NOTE — TELEPHONE ENCOUNTER
Last OV 11/14 w/Brittney  Next OV 3/14 w/Marsha DEUTSCH on UDS/CSA  Her insurance likely only allows 15 for 30 days, which is why they are asking for 45 for 90 days.

## 2024-01-16 ENCOUNTER — OFFICE VISIT (OUTPATIENT)
Dept: INTERNAL MEDICINE | Facility: CLINIC | Age: 64
End: 2024-01-16
Payer: COMMERCIAL

## 2024-01-16 VITALS
SYSTOLIC BLOOD PRESSURE: 120 MMHG | DIASTOLIC BLOOD PRESSURE: 76 MMHG | TEMPERATURE: 97.3 F | BODY MASS INDEX: 24.17 KG/M2 | RESPIRATION RATE: 18 BRPM | WEIGHT: 154 LBS | HEART RATE: 76 BPM | HEIGHT: 67 IN | OXYGEN SATURATION: 96 %

## 2024-01-16 DIAGNOSIS — J06.9 VIRAL UPPER RESPIRATORY TRACT INFECTION: Primary | ICD-10-CM

## 2024-01-16 LAB
EXPIRATION DATE: NORMAL
EXPIRATION DATE: NORMAL
FLUAV AG NPH QL: NEGATIVE
FLUBV AG NPH QL: NEGATIVE
INTERNAL CONTROL: NORMAL
INTERNAL CONTROL: NORMAL
Lab: NORMAL
Lab: NORMAL
SARS-COV-2 AG UPPER RESP QL IA.RAPID: NORMAL

## 2024-01-16 PROCEDURE — 87426 SARSCOV CORONAVIRUS AG IA: CPT

## 2024-01-16 PROCEDURE — 96372 THER/PROPH/DIAG INJ SC/IM: CPT

## 2024-01-16 PROCEDURE — 87804 INFLUENZA ASSAY W/OPTIC: CPT

## 2024-01-16 PROCEDURE — 99213 OFFICE O/P EST LOW 20 MIN: CPT

## 2024-01-16 RX ORDER — METHYLPREDNISOLONE SODIUM SUCCINATE 40 MG/ML
40 INJECTION, POWDER, LYOPHILIZED, FOR SOLUTION INTRAMUSCULAR; INTRAVENOUS ONCE
Status: COMPLETED | OUTPATIENT
Start: 2024-01-16 | End: 2024-01-16

## 2024-01-16 RX ORDER — PREDNISONE 10 MG/1
TABLET ORAL
Qty: 21 TABLET | Refills: 0 | Status: SHIPPED | OUTPATIENT
Start: 2024-01-16 | End: 2024-01-25

## 2024-01-16 RX ORDER — AZITHROMYCIN 250 MG/1
250 TABLET, FILM COATED ORAL TAKE AS DIRECTED
Qty: 6 TABLET | Refills: 0 | Status: SHIPPED | OUTPATIENT
Start: 2024-01-16

## 2024-01-16 RX ORDER — ALBUTEROL SULFATE 90 UG/1
2 AEROSOL, METERED RESPIRATORY (INHALATION) EVERY 4 HOURS PRN
Qty: 18 G | Refills: 0 | Status: SHIPPED | OUTPATIENT
Start: 2024-01-16

## 2024-01-16 RX ADMIN — METHYLPREDNISOLONE SODIUM SUCCINATE 40 MG: 40 INJECTION, POWDER, LYOPHILIZED, FOR SOLUTION INTRAMUSCULAR; INTRAVENOUS at 14:53

## 2024-01-16 NOTE — PROGRESS NOTES
Subjective     Chief Complaint:  Cough, congestion    HPI:  Patient presents today with complaints of cough, congestion, and yellow mucus production.  Please see assessment plan below.    Past Medical History:   Past Medical History:   Diagnosis Date    ASCUS with positive high risk HPV     Dysmenorrhea     Endometrial thickening on ultra sound     Follow-up examination after gynecological surgery     Menopausal symptoms     Menorrhagia     Pelvic pain     Vaginitis      Past Surgical History:  Past Surgical History:   Procedure Laterality Date    APPENDECTOMY N/A 11/14/2019    Procedure: APPENDECTOMY LAPAROSCOPIC POSSIBLE OPEN;  Surgeon: Angeles Gallagher MD;  Location: [facility] OR;  Service: General    CHOLECYSTECTOMY      COLONOSCOPY N/A 2/20/2020    Procedure: COLONOSCOPY WITH ANESTHESIA;  Surgeon: Thaddeus Meraz DO;  Location: [facility] ENDOSCOPY;  Service: Gastroenterology;  Laterality: N/A;  preop; abdominal pain  postop; diverticulosis   PCP Jason Cuello     DILATATION AND CURETTAGE      of uterus       Allergies:  Allergies   Allergen Reactions    Codeine Nausea And Vomiting     Medications:  Prior to Admission medications    Medication Sig Start Date End Date Taking? Authorizing Provider   cetirizine (zyrTEC) 10 MG tablet Take 1 tablet by mouth Daily.   Yes Provider, MD Jose   fluticasone (FLONASE) 50 MCG/ACT nasal spray 2 sprays into the nostril(s) as directed by provider Daily. 9/14/23  Yes Delroy Larson MD   lisinopril (PRINIVIL,ZESTRIL) 40 MG tablet TAKE 1 TABLET BY MOUTH DAILY. 8/29/23  Yes Delroy Larson MD   pravastatin (PRAVACHOL) 10 MG tablet TAKE 1 TABLET BY MOUTH DAILY. 6/22/23  Yes Delroy Larson MD   traZODone (DESYREL) 100 MG tablet TAKE 1 TABLET BY MOUTH EVERY NIGHT. 11/14/23  Yes MOHINDER Frausto DO   zolpidem (AMBIEN) 10 MG tablet TAKE ONE TABLET BY MOUTH EVERY BEDTIME AS NEEDED **MAY MAKE DROWSY** 11/29/23  Yes Rosette Gama APRN   benzonatate  "(Tessalon Perles) 100 MG capsule Take 1 capsule by mouth 3 (Three) Times a Day As Needed for Cough. 11/14/23   Brittney Cid, GIOVANNY   Dextromethorphan-guaiFENesin (Mucinex DM Maximum Strength)  MG tablet sustained-release 12 hour Take 1 tablet by mouth 2 (Two) Times a Day.    Provider, MD Jose       Objective     Vital Signs: /76 (BP Location: Left arm, Patient Position: Sitting, Cuff Size: Adult)   Pulse 76   Temp 97.3 °F (36.3 °C)   Resp 18   Ht 170.2 cm (67\")   Wt 69.9 kg (154 lb)   SpO2 96%   BMI 24.12 kg/m²   Physical Exam  Vitals and nursing note reviewed.   Constitutional:       General: She is not in acute distress.     Appearance: Normal appearance.   HENT:      Right Ear: Tympanic membrane normal.      Left Ear: Tympanic membrane normal.      Nose: No congestion.      Mouth/Throat:      Pharynx: Posterior oropharyngeal erythema present. No oropharyngeal exudate.   Cardiovascular:      Rate and Rhythm: Normal rate and regular rhythm.      Pulses: Normal pulses.      Heart sounds: Normal heart sounds. No murmur heard.  Pulmonary:      Effort: Pulmonary effort is normal. No respiratory distress.      Comments: Breath sounds diminished.  Frequent, congested cough.  Abdominal:      General: Bowel sounds are normal. There is no distension.      Palpations: Abdomen is soft.      Tenderness: There is no abdominal tenderness.   Musculoskeletal:         General: Normal range of motion.   Skin:     General: Skin is warm and dry.      Capillary Refill: Capillary refill takes less than 2 seconds.      Findings: No bruising, erythema or rash.   Neurological:      Mental Status: She is alert and oriented to person, place, and time. Mental status is at baseline.      Motor: No weakness.       Results Reviewed:  Results for orders placed or performed in visit on 01/16/24   POCT SARS-CoV-2 Antigen PAM    Specimen: Swab   Result Value Ref Range    SARS Antigen Presumptive Negative Not Detected, " Presumptive Negative    Internal Control Passed Passed    Lot Number [id number]     Expiration Date 07/11/2024    POCT Influenza A/B    Specimen: Swab   Result Value Ref Range    Rapid Influenza A Ag Negative Negative    Rapid Influenza B Ag Negative Negative    Internal Control Passed Passed    Lot Number [id number]     Expiration Date 11/30/2024        Assessment / Plan     Assessment/Plan:  Diagnoses and all orders for this visit:    1. Viral upper respiratory tract infection (Primary)  -     POCT SARS-CoV-2 Antigen PAM  -     POCT Influenza A/B  -     azithromycin (Zithromax Z-Moshe) 250 MG tablet; Take 1 tablet by mouth Take As Directed. Take 2 tablets by mouth the first day, then 1 tablet daily for 4 days.  Dispense: 6 tablet; Refill: 0  -     methylPREDNISolone sodium succinate (SOLU-Medrol) injection 40 mg  -     predniSONE (DELTASONE) 10 MG tablet; Take 4 tablets by mouth Daily for 3 days, THEN 2 tablets Daily for 3 days, THEN 1 tablet Daily for 3 days.  Dispense: 21 tablet; Refill: 0  -     albuterol sulfate  (90 Base) MCG/ACT inhaler; Inhale 2 puffs Every 4 (Four) Hours As Needed for Wheezing.  Dispense: 18 g; Refill: 0       Patient presents today with complaints of cough, congestion, and yellow mucus production.  She reports that she always has a cough so she is not sure exactly when her symptoms started.  She states that she gets this a couple of times every year.  She states that over the last couple days she has had a copious amount of sputum production.  She states that the sputum is yellow/green in color.  She has been having coughing spells that cause her to become short of breath.  Her voice has become hoarse over the last 24 hours.  She denies ear pain/fullness or sore throat.  She denies wheezing or fever.  She was treated for similar symptoms in March and November 2023.  The last time she was prescribed Augmentin but was unable to take it as directed due to the size of the pills.  She recalls  recovering well after she was treated in March.  At that time she received a Solu-Medrol injection, azithromycin, prednisone taper, and an inhaler.  However, she reports that she never used the inhaler because the pharmacy did not have it in stock.      COVID and flu swabs were obtained today which were negative.  Patient has had multiple x-rays in the past when the symptoms have occurred and they have all shown no acute cardiopulmonary process.  Will hold off on imaging today but would not hesitate to obtain an x-ray if her symptoms do not improve with treatment.  Will plan to give Solu-Medrol injection in the clinic today.  She will be given a Z-Moshe and prednisone taper.  Discussed that she would likely benefit from using an inhaler.  Will send in albuterol.  She may use this every 4 hours as needed.  She declines cough suppressant today.  She is aware that continuing to smoke is likely causing her to have these frequent flareups.  Encouraged her to call if her symptoms worsen or fail to improve.    Return for Next scheduled follow up. unless patient needs to be seen sooner or acute issues arise.    I have discussed the patient results/orders and and plan/recommendation with them at today's visit.      Brittney Cid, APRN   01/16/2024

## 2024-01-30 ENCOUNTER — TELEPHONE (OUTPATIENT)
Dept: INTERNAL MEDICINE | Facility: CLINIC | Age: 64
End: 2024-01-30

## 2024-01-30 NOTE — TELEPHONE ENCOUNTER
Caller: Elizabeth Carrillo    Relationship: Self    Best call back number: [phone]     What medication are you requesting: SOMETHING FOR URINE BEING YELLOWISH, BURNING AND SMELLING. ALSO NEEDS SOMETHING ELSE FOR THE BRONCHITIS    What are your current symptoms: COUGH, CHEST CONGESTION, ALSO SHE HAS BURNING WITH URINATION, SMELLING AND SMELLY URINE    How long have you been experiencing symptoms: LAST FRIDAY     Have you had these symptoms before:    [x] Yes  [] No    Have you been treated for these symptoms before:   [x] Yes  [] No    If a prescription is needed, what is your preferred pharmacy and phone number: [facility] - [place], [place] - [address] - [phone]  - [phone] FX     Additional notes: ELIZABETH WOULD LIKE SOMETHING CALLED FOR HER BRONCHITIS BECAUSE IT'S HAS NOT CLEARED UP. ALSO SHE HAS A BURNING WITH URINATION, STRONG ODOR WITH URINE AND WOULD LIKE SOMETHING CALLED IN.      PLEASE CALL BACK

## 2024-01-31 ENCOUNTER — OFFICE VISIT (OUTPATIENT)
Dept: INTERNAL MEDICINE | Facility: CLINIC | Age: 64
End: 2024-01-31
Payer: COMMERCIAL

## 2024-01-31 VITALS
OXYGEN SATURATION: 98 % | BODY MASS INDEX: 24.08 KG/M2 | HEIGHT: 67 IN | HEART RATE: 95 BPM | TEMPERATURE: 97.8 F | DIASTOLIC BLOOD PRESSURE: 70 MMHG | SYSTOLIC BLOOD PRESSURE: 112 MMHG | WEIGHT: 153.4 LBS

## 2024-01-31 DIAGNOSIS — J40 BRONCHITIS: ICD-10-CM

## 2024-01-31 DIAGNOSIS — N30.00 ACUTE CYSTITIS WITHOUT HEMATURIA: Primary | ICD-10-CM

## 2024-01-31 DIAGNOSIS — K21.9 GASTROESOPHAGEAL REFLUX DISEASE, UNSPECIFIED WHETHER ESOPHAGITIS PRESENT: ICD-10-CM

## 2024-01-31 LAB
BILIRUB BLD-MCNC: NEGATIVE MG/DL
CLARITY, POC: ABNORMAL
COLOR UR: YELLOW
EXPIRATION DATE: ABNORMAL
GLUCOSE UR STRIP-MCNC: NEGATIVE MG/DL
KETONES UR QL: NEGATIVE
LEUKOCYTE EST, POC: NEGATIVE
Lab: ABNORMAL
NITRITE UR-MCNC: NEGATIVE MG/ML
PH UR: 6 [PH] (ref 5–8)
PROT UR STRIP-MCNC: NEGATIVE MG/DL
RBC # UR STRIP: ABNORMAL /UL
SP GR UR: 1.01 (ref 1–1.03)
UROBILINOGEN UR QL: ABNORMAL

## 2024-01-31 RX ORDER — CEFDINIR 300 MG/1
300 CAPSULE ORAL 2 TIMES DAILY
Qty: 14 CAPSULE | Refills: 0 | Status: SHIPPED | OUTPATIENT
Start: 2024-01-31 | End: 2024-02-07

## 2024-01-31 RX ORDER — PANTOPRAZOLE SODIUM 40 MG/1
40 TABLET, DELAYED RELEASE ORAL DAILY
Qty: 90 TABLET | Refills: 0 | Status: SHIPPED | OUTPATIENT
Start: 2024-01-31

## 2024-01-31 RX ORDER — METHYLPREDNISOLONE SODIUM SUCCINATE 40 MG/ML
60 INJECTION, POWDER, LYOPHILIZED, FOR SOLUTION INTRAMUSCULAR; INTRAVENOUS ONCE
Status: COMPLETED | OUTPATIENT
Start: 2024-01-31 | End: 2024-01-31

## 2024-01-31 RX ADMIN — METHYLPREDNISOLONE SODIUM SUCCINATE 60 MG: 40 INJECTION, POWDER, LYOPHILIZED, FOR SOLUTION INTRAMUSCULAR; INTRAVENOUS at 08:37

## 2024-01-31 NOTE — PROGRESS NOTES
Subjective     Chief Complaint:  Malodorous urine    HPI:  Patient presents today with complaints of malodorous urine.  She also complains of ongoing cough with sputum production.  Please see assessment and plan below.    Past Medical History:   Past Medical History:   Diagnosis Date    ASCUS with positive high risk HPV     Dysmenorrhea     Endometrial thickening on ultra sound     Follow-up examination after gynecological surgery     Menopausal symptoms     Menorrhagia     Pelvic pain     Vaginitis      Past Surgical History:  Past Surgical History:   Procedure Laterality Date    APPENDECTOMY N/A 11/14/2019    Procedure: APPENDECTOMY LAPAROSCOPIC POSSIBLE OPEN;  Surgeon: Angeles Gallagher MD;  Location: [facility] OR;  Service: General    CHOLECYSTECTOMY      COLONOSCOPY N/A 2/20/2020    Procedure: COLONOSCOPY WITH ANESTHESIA;  Surgeon: Thaddeus Meraz DO;  Location: [facility] ENDOSCOPY;  Service: Gastroenterology;  Laterality: N/A;  preop; abdominal pain  postop; diverticulosis   PCP Jason Cuello     DILATATION AND CURETTAGE      of uterus       Allergies:  Allergies   Allergen Reactions    Codeine Nausea And Vomiting     Medications:  Prior to Admission medications    Medication Sig Start Date End Date Taking? Authorizing Provider   albuterol sulfate  (90 Base) MCG/ACT inhaler Inhale 2 puffs Every 4 (Four) Hours As Needed for Wheezing. 1/16/24  Yes Brittney Cid APRN   cetirizine (zyrTEC) 10 MG tablet Take 1 tablet by mouth Daily.   Yes Provider, MD Jose   fluticasone (FLONASE) 50 MCG/ACT nasal spray 2 sprays into the nostril(s) as directed by provider Daily. 9/14/23  Yes Delroy Larson MD   lisinopril (PRINIVIL,ZESTRIL) 40 MG tablet TAKE 1 TABLET BY MOUTH DAILY. 8/29/23  Yes Delroy Larson MD   pravastatin (PRAVACHOL) 10 MG tablet TAKE 1 TABLET BY MOUTH DAILY. 6/22/23  Yes Delroy Larson MD   traZODone (DESYREL) 100 MG tablet TAKE 1 TABLET BY MOUTH EVERY NIGHT. 11/14/23  Yes  "MOHINDER Frausto,    zolpidem (AMBIEN) 10 MG tablet TAKE ONE TABLET BY MOUTH EVERY BEDTIME AS NEEDED **MAY MAKE DROWSY** 11/29/23  Yes Rosette Gama APRN   azithromycin (Zithromax Z-Moshe) 250 MG tablet Take 1 tablet by mouth Take As Directed. Take 2 tablets by mouth the first day, then 1 tablet daily for 4 days. 1/16/24 1/31/24 Yes Brittney Cid APRN   benzonatate (Tessalon Perles) 100 MG capsule Take 1 capsule by mouth 3 (Three) Times a Day As Needed for Cough. 11/14/23 1/31/24 Yes Brittney Cid APRN   Dextromethorphan-guaiFENesin (Mucinex DM Maximum Strength)  MG tablet sustained-release 12 hour Take 1 tablet by mouth 2 (Two) Times a Day.  1/31/24 Yes Provider, MD Jose       Objective     Vital Signs: /70 (BP Location: Left arm, Patient Position: Sitting, Cuff Size: Adult)   Pulse 95   Temp 97.8 °F (36.6 °C) (Temporal)   Ht 170.2 cm (67.01\")   Wt 69.6 kg (153 lb 6.4 oz)   SpO2 98%   BMI 24.02 kg/m²   Physical Exam  Vitals and nursing note reviewed.   Constitutional:       General: She is not in acute distress.     Appearance: Normal appearance.   Cardiovascular:      Rate and Rhythm: Normal rate and regular rhythm.      Pulses: Normal pulses.      Heart sounds: Normal heart sounds. No murmur heard.  Pulmonary:      Effort: Pulmonary effort is normal. No respiratory distress.      Breath sounds: Normal breath sounds.      Comments: Productive cough  Abdominal:      General: There is no distension.      Tenderness: There is no abdominal tenderness.   Musculoskeletal:         General: Tenderness (bilateral low back tenderness) present. Normal range of motion.   Skin:     General: Skin is warm and dry.      Capillary Refill: Capillary refill takes less than 2 seconds.      Findings: No bruising, erythema or rash.   Neurological:      Mental Status: She is alert and oriented to person, place, and time. Mental status is at baseline.      Motor: No weakness.       Results " Reviewed:  Results for orders placed or performed in visit on 01/31/24   POC Urinalysis Dipstick, Automated    Specimen: Urine   Result Value Ref Range    Color Yellow Yellow, Straw, Dark Yellow, Faustina    Clarity, UA Slightly Cloudy (A) Clear    Specific Gravity  1.015 1.005 - 1.030    pH, Urine 6.0 5.0 - 8.0    Leukocytes Negative Negative    Nitrite, UA Negative Negative    Protein, POC Negative Negative mg/dL    Glucose, UA Negative Negative mg/dL    Ketones, UA Negative Negative    Urobilinogen, UA 0.2 E.U./dL Normal, 0.2 E.U./dL    Bilirubin Negative Negative    Blood, UA Small (A) Negative    Lot Number [id number]     Expiration Date 10/31/2024        Assessment / Plan     Assessment/Plan:  Diagnoses and all orders for this visit:    1. Acute cystitis without hematuria (Primary)  -     POC Urinalysis Dipstick, Automated  -     cefdinir (OMNICEF) 300 MG capsule; Take 1 capsule by mouth 2 (Two) Times a Day for 7 days.  Dispense: 14 capsule; Refill: 0  -     Urine Culture - Urine, Urine, Clean Catch    2. Bronchitis  -     methylPREDNISolone sodium succinate (SOLU-Medrol) injection 60 mg    3. Gastroesophageal reflux disease, unspecified whether esophagitis present  -     pantoprazole (PROTONIX) 40 MG EC tablet; Take 1 tablet by mouth Daily.  Dispense: 90 tablet; Refill: 0       Patient presents today with complaints of malodorous urine.  She states that this started a couple of days ago.  She has also noticed some burning with urination and she states that the burning lingers even after she urinates.  She complains of bilateral flank pain.  She states that she always has some low back pain but it became worse when she noticed her urinary symptoms.  She denies urinary frequency or gross hematuria.  She denies fever or chills.  Urinalysis in the clinic today showed small blood.  Will plan to treat with cefdinir and obtain urine culture.  Recommend focusing on increasing fluid intake.  Will call her when urine culture  results are available.    Patient was seen on 2024 with complaints of cough, congestion, and yellow sputum production.  She was treated for upper respiratory infection with azithromycin, Solu-Medrol injection, and prednisone taper.  She states that she is feeling about 75% better but she continues to cough up sputum.  She was actually able to cough some up in the clinic today.  Her sputum appears to be yellow/tan in color with small chunks.  She has been experiencing some mild shortness of breath.  She states that when she walks from her parking lot into her work building she feels short of breath.  She states that she does not feel bad enough that she would have return to the clinic to discuss the symptoms but since she is here for her urinary symptoms she wanted to revisit it.  Will hold off on obtaining chest x-ray since she is feeling better.  She will be given Solu-Medrol 60 mg injection in the clinic today.  Cefdinir that was prescribed for her urinary symptoms should provide some coverage for her respiratory symptoms as well.    She also complains of reflux.  She states that she has had this issue for quite some time and only noticed it occurring when she would eat certain foods.  However, she feels that it has gotten worse over the last couple of weeks and is now occurring with almost all foods that she eats.  She describes it as a burning in her stomach.  She notices that it is worse when she lies down at night.  She states that she noticed this last night after she ate some spaghetti O's.  She took [employer] to help with this but she thinks that it is likely .  Will treat with Protonix.  I have advised her to take this consistently for a few weeks and see if this provides her any relief.  She will let me know if her symptoms persist.    She will keep her scheduled follow-up with Dr. Larson on 3/14/2024.    Return for Next scheduled follow up. unless patient needs to be seen sooner or acute  issues arise.    I have discussed the patient results/orders and and plan/recommendation with them at today's visit.      Brittney Cid, APRN   01/31/2024

## 2024-02-02 LAB
BACTERIA UR CULT: NO GROWTH
BACTERIA UR CULT: NORMAL

## 2024-02-12 DIAGNOSIS — G47.00 INSOMNIA, UNSPECIFIED TYPE: ICD-10-CM

## 2024-02-12 RX ORDER — ZOLPIDEM TARTRATE 10 MG/1
10 TABLET ORAL NIGHTLY PRN
Qty: 45 TABLET | Refills: 1 | Status: SHIPPED | OUTPATIENT
Start: 2024-02-12

## 2024-02-20 ENCOUNTER — TELEPHONE (OUTPATIENT)
Dept: INTERNAL MEDICINE | Facility: CLINIC | Age: 64
End: 2024-02-20
Payer: COMMERCIAL

## 2024-02-20 RX ORDER — FLUCONAZOLE 150 MG/1
150 TABLET ORAL ONCE
Qty: 2 TABLET | Refills: 0 | Status: SHIPPED | OUTPATIENT
Start: 2024-02-20 | End: 2024-02-20

## 2024-02-20 NOTE — TELEPHONE ENCOUNTER
Caller: Elizabeth Carrillo    Relationship: Self    Best call back number: [phone]     What medication are you requesting: DIFLUCAN     What are your current symptoms: SYMPTOMS OF YEAST INFECTION     Have you had these symptoms before:    [x] Yes  [] No    Have you been treated for these symptoms before:   [x] Yes  [] No    If a prescription is needed, what is your preferred pharmacy and phone number: [place], [place] - [address] - [phone]  - [phone] FX

## 2024-02-22 ENCOUNTER — OFFICE VISIT (OUTPATIENT)
Dept: INTERNAL MEDICINE | Facility: CLINIC | Age: 64
End: 2024-02-22
Payer: COMMERCIAL

## 2024-02-22 VITALS
WEIGHT: 155 LBS | BODY MASS INDEX: 24.33 KG/M2 | OXYGEN SATURATION: 97 % | HEIGHT: 67 IN | HEART RATE: 80 BPM | DIASTOLIC BLOOD PRESSURE: 74 MMHG | TEMPERATURE: 97.8 F | SYSTOLIC BLOOD PRESSURE: 118 MMHG

## 2024-02-22 DIAGNOSIS — H10.33 ACUTE BACTERIAL CONJUNCTIVITIS OF BOTH EYES: ICD-10-CM

## 2024-02-22 DIAGNOSIS — J41.1 BRONCHITIS, MUCOPURULENT RECURRENT: Primary | ICD-10-CM

## 2024-02-22 PROCEDURE — 99213 OFFICE O/P EST LOW 20 MIN: CPT | Performed by: NURSE PRACTITIONER

## 2024-02-22 RX ORDER — GUAIFENESIN 600 MG/1
1200 TABLET, EXTENDED RELEASE ORAL 2 TIMES DAILY
Qty: 40 TABLET | Refills: 0 | Status: SHIPPED | OUTPATIENT
Start: 2024-02-22 | End: 2024-03-03

## 2024-02-22 RX ORDER — TOBRAMYCIN AND DEXAMETHASONE 3; 1 MG/ML; MG/ML
1 SUSPENSION/ DROPS OPHTHALMIC
Qty: 2 ML | Refills: 0 | Status: SHIPPED | OUTPATIENT
Start: 2024-02-22 | End: 2024-02-27

## 2024-03-14 ENCOUNTER — OFFICE VISIT (OUTPATIENT)
Dept: INTERNAL MEDICINE | Facility: CLINIC | Age: 64
End: 2024-03-14
Payer: COMMERCIAL

## 2024-03-14 VITALS
DIASTOLIC BLOOD PRESSURE: 70 MMHG | HEIGHT: 67 IN | HEART RATE: 75 BPM | TEMPERATURE: 97.8 F | WEIGHT: 160 LBS | BODY MASS INDEX: 25.11 KG/M2 | OXYGEN SATURATION: 98 % | SYSTOLIC BLOOD PRESSURE: 120 MMHG

## 2024-03-14 DIAGNOSIS — Z72.0 TOBACCO ABUSE: ICD-10-CM

## 2024-03-14 DIAGNOSIS — R31.9 HEMATURIA, UNSPECIFIED TYPE: ICD-10-CM

## 2024-03-14 DIAGNOSIS — I10 ESSENTIAL HYPERTENSION: ICD-10-CM

## 2024-03-14 DIAGNOSIS — K21.9 GASTROESOPHAGEAL REFLUX DISEASE, UNSPECIFIED WHETHER ESOPHAGITIS PRESENT: ICD-10-CM

## 2024-03-14 DIAGNOSIS — N39.0 URINARY TRACT INFECTION WITHOUT HEMATURIA, SITE UNSPECIFIED: Primary | ICD-10-CM

## 2024-03-14 DIAGNOSIS — B37.31 VULVOVAGINAL CANDIDIASIS: ICD-10-CM

## 2024-03-14 LAB
BILIRUB BLD-MCNC: NEGATIVE MG/DL
CLARITY, POC: CLEAR
COLOR UR: ABNORMAL
EXPIRATION DATE: ABNORMAL
GLUCOSE UR STRIP-MCNC: NEGATIVE MG/DL
KETONES UR QL: NEGATIVE
LEUKOCYTE EST, POC: NEGATIVE
Lab: ABNORMAL
NITRITE UR-MCNC: NEGATIVE MG/ML
PH UR: 6 [PH] (ref 5–8)
PROT UR STRIP-MCNC: NEGATIVE MG/DL
RBC # UR STRIP: ABNORMAL /UL
SP GR UR: 1.01 (ref 1–1.03)
UROBILINOGEN UR QL: ABNORMAL

## 2024-03-14 RX ORDER — FLUCONAZOLE 150 MG/1
150 TABLET ORAL DAILY
Qty: 3 TABLET | Refills: 0 | Status: SHIPPED | OUTPATIENT
Start: 2024-03-14

## 2024-03-14 NOTE — PROGRESS NOTES
"      Chief Complaint  Hypertension (6 month follow up ), pneumo vaccine, eye problem (Feels like a times since having pink eye she still wakes up a month later with her eyes crusted shut ), and Vaginitis (Pt had a yeast infection from some antibiotics not long ago and it was treated but is still bothering her some)    Subjective        Elizabeth Carrillo presents to [facility]    HPI    Patient here for the above problems.  See Assessment and Plan for further HPI components.      Review of Systems    Objective   Vital Signs:  /70 (BP Location: Left arm, Patient Position: Sitting, Cuff Size: Adult)   Pulse 75   Temp 97.8 °F (36.6 °C) (Temporal)   Ht 170.2 cm (67\")   Wt 72.6 kg (160 lb)   SpO2 98%   BMI 25.06 kg/m²   Estimated body mass index is 25.06 kg/m² as calculated from the following:    Height as of this encounter: 170.2 cm (67\").    Weight as of this encounter: 72.6 kg (160 lb).      Physical Exam  Vitals and nursing note reviewed.   Constitutional:       Appearance: She is not ill-appearing.   Eyes:      General: No scleral icterus.     Conjunctiva/sclera: Conjunctivae normal.   Cardiovascular:      Rate and Rhythm: Normal rate and regular rhythm.   Pulmonary:      Effort: Pulmonary effort is normal. No respiratory distress.   Skin:     General: Skin is warm.      Coloration: Skin is not pale.      Comments: No change in mole on right neck    Neurological:      General: No focal deficit present.      Mental Status: She is alert and oriented to person, place, and time.   Psychiatric:         Mood and Affect: Mood normal.         Behavior: Behavior normal.                         Assessment and Plan   Diagnoses and all orders for this visit:    1. Urinary tract infection without hematuria, site unspecified (Primary)  -     POC Urinalysis Dipstick, Automated  -     Ambulatory Referral to Urology    2. Vulvovaginal candidiasis  -     fluconazole (DIFLUCAN) 150 MG tablet; " "Take 1 tablet by mouth Daily.  Dispense: 3 tablet; Refill: 0    3. Hematuria, unspecified type  -     Ambulatory Referral to Urology    4. Tobacco abuse    5. Essential hypertension      Patient has some discharge and some vaginal discomfort.  Will trial another round of fluconazole for 3 days in a row.  Patient having some burning still.  UA negative except hematuria.   Looking back, patient has had multiple ocassions of blood in her urine even when she did not have a UTI.  Patient is a smoker.  Patient has never had this worked up.  Patient has continued to have symptoms.  Will refer to urology for evaluation of this.  Patient does not have any high-risk sexual behaviors.  Low suspicion for STI as patient reports [place]".        Patient turned 64.  Patient due for PCV 20.  Patient has a large case load on Monday.  Patient would like to wait on the vaccine.      Patient has hypertension.  BP is at goal.  The patient's BP goal is < 130/80.  Recommend ambulatory BP monitoring after patient has taken medication.  Recommend varying the times of the blood pressure readings.  Patient is currently on lisinopril.  Recommend we continue current regimen.    Continue to monitor.    Patient has a history of GERD.  Patient is currently on PPI.  I do not see an EGD in the past.  Recommend PRN use of tums or Gaviscon, if use increases let our office know.  Consider lifestyle modifications including, weight loss, avoiding meals 2 hours before bedtime.  Try to avoid excess chocolate, tobacco, caffeine, alcohol, acidic foods, and spicy foods.  Consider elevation of head of the bed by 4-6 inches.  The patient reports that since starting the protonix recently her symptoms have been much better.  I'd like to stay on this for a while and then subsequently consider trying to reduce down to an H2 blocker.       Recommend smoking cessation.     Result Review :           BMI is >= 25 and <30. " (Overweight) The following options were offered after discussion;: exercise counseling/recommendations and nutrition counseling/recommendations      BMI is >= 25 and <30. (Overweight) The following options were offered after discussion;: exercise counseling/recommendations and nutrition counseling/recommendations            Follow Up   Return in about 6 months (around 9/14/2024), or if symptoms worsen or fail to improve, for Next scheduled follow up, Annual physical.  Patient was given instructions and counseling regarding her condition or for health maintenance advice. Please see specific information pulled into the AVS if appropriate.       TEENA Larson MD, FACP, FH      Electronically signed by Delroy Larson MD, 03/14/24, 3:34 PM CDT.

## 2024-04-01 DIAGNOSIS — K21.9 GASTROESOPHAGEAL REFLUX DISEASE, UNSPECIFIED WHETHER ESOPHAGITIS PRESENT: ICD-10-CM

## 2024-04-01 DIAGNOSIS — G47.00 INSOMNIA, UNSPECIFIED TYPE: ICD-10-CM

## 2024-04-01 NOTE — PROGRESS NOTES
"Subjective    Ms. Carrillo is 64 y.o. female    Chief Complaint: gross hematuria    History of Present Illness    64-year-old female new patient referred for blood in urine.  Reports has noticed a correlation on urinalyses with blood noted for approximately 1 year.  Has started seeing occasional \"dark\" urine intermittently over the past few months.  Is a current daily smoker.  Reports a family history in distant relative and her grandmothers sisters daughter of bladder CA.     Newly reporting since recent episode of bronchitis urinary leakage with coughing.    The following portions of the patient's history were reviewed and updated as appropriate: allergies, current medications, past family history, past medical history, past social history, past surgical history and problem list.    Review of Systems   Constitutional:  Negative for chills and fever.   Gastrointestinal:  Negative for abdominal pain, anal bleeding, blood in stool, nausea and vomiting.   Genitourinary:  Positive for frequency and hematuria. Negative for dysuria.         Current Outpatient Medications:     albuterol sulfate  (90 Base) MCG/ACT inhaler, Inhale 2 puffs Every 4 (Four) Hours As Needed for Wheezing., Disp: 18 g, Rfl: 0    cetirizine (zyrTEC) 10 MG tablet, Take 1 tablet by mouth Daily., Disp: , Rfl:     fluconazole (DIFLUCAN) 150 MG tablet, Take 1 tablet by mouth Daily., Disp: 3 tablet, Rfl: 0    fluticasone (FLONASE) 50 MCG/ACT nasal spray, 2 sprays into the nostril(s) as directed by provider Daily., Disp: 9.9 mL, Rfl: 2    lisinopril (PRINIVIL,ZESTRIL) 40 MG tablet, TAKE 1 TABLET BY MOUTH DAILY., Disp: 90 tablet, Rfl: 3    pantoprazole (PROTONIX) 40 MG EC tablet, Take 1 tablet by mouth Daily., Disp: 90 tablet, Rfl: 0    pravastatin (PRAVACHOL) 10 MG tablet, Take 1 tablet by mouth Daily., Disp: 90 tablet, Rfl: 3    traZODone (DESYREL) 100 MG tablet, Take 1 tablet by mouth Every Night., Disp: 90 tablet, Rfl: 1    zolpidem (AMBIEN) 10 MG " "tablet, Take 1 tablet by mouth At Night As Needed for Sleep., Disp: 45 tablet, Rfl: 1    Past Medical History:   Diagnosis Date    ASCUS with positive high risk HPV     Dysmenorrhea     Endometrial thickening on ultra sound     Follow-up examination after gynecological surgery     Menopausal symptoms     Menorrhagia     Pelvic pain     Vaginitis        Past Surgical History:   Procedure Laterality Date    APPENDECTOMY N/A 11/14/2019    Procedure: APPENDECTOMY LAPAROSCOPIC POSSIBLE OPEN;  Surgeon: Angeles Gallagher MD;  Location: [facility] OR;  Service: General    CHOLECYSTECTOMY      COLONOSCOPY N/A 2/20/2020    Procedure: COLONOSCOPY WITH ANESTHESIA;  Surgeon: Thaddeus Meraz DO;  Location: [facility] ENDOSCOPY;  Service: Gastroenterology;  Laterality: N/A;  preop; abdominal pain  postop; diverticulosis   PCP Jason Cuello     DILATATION AND CURETTAGE      of uterus       Social History     Socioeconomic History    Marital status:    Tobacco Use    Smoking status: Every Day     Current packs/day: 1.00     Average packs/day: 1 pack/day for 40.3 years (40.3 ttl pk-yrs)     Types: Cigarettes     Start date: 1984    Smokeless tobacco: Never   Vaping Use    Vaping status: Former   Substance and Sexual Activity    Alcohol use: Yes     Comment: occasional    Drug use: No    Sexual activity: Not Currently       Family History   Problem Relation Age of Onset    Diabetes Mother     Diabetes Father     Colon cancer Neg Hx     Colon polyps Neg Hx        Objective    Temp 97.4 °F (36.3 °C)   Ht 170.2 cm (67.01\")   Wt 72.6 kg (160 lb)   BMI 25.05 kg/m²     Physical Exam  Nursing note reviewed.   Constitutional:       General: She is not in acute distress.     Appearance: Normal appearance. She is not ill-appearing.   HENT:      Nose: No congestion.   Abdominal:      Tenderness: There is no right CVA tenderness or left CVA tenderness.      Hernia: No hernia is present.   Skin:     General: Skin is warm and dry. "   Neurological:      Mental Status: She is alert and oriented to person, place, and time.   Psychiatric:         Mood and Affect: Mood normal.         Behavior: Behavior normal.             Results for orders placed or performed in visit on 04/11/24   POC Urinalysis Dipstick, Multipro    Specimen: Urine   Result Value Ref Range    Color Yellow Yellow, Straw, Dark Yellow, Faustina    Clarity, UA Clear Clear    Glucose, UA Negative Negative mg/dL    Bilirubin Negative Negative    Ketones, UA Negative Negative    Specific Gravity  1.010 1.005 - 1.030    Blood, UA Small (A) Negative    pH, Urine 5.5 5.0 - 8.0    Protein, POC Negative Negative mg/dL    Urobilinogen, UA 0.2 E.U./dL Normal, 0.2 E.U./dL    Nitrite, UA Negative Negative    Leukocytes Trace (A) Negative     Assessment and Plan    Diagnoses and all orders for this visit:    1. Gross hematuria (Primary)  -     POC Urinalysis Dipstick, Multipro  -     Urine Culture - Urine, Urine, Random Void  -     Non-gynecologic Cytology  -     CT Abdomen Pelvis With & Without Contrast; Future    2. Stress incontinence      Urinalysis today blood again noted.  Microscopic evaluation revealing 5-10 red blood cells per high-power field.  Based on this finding and risk factors and age and tobacco use recommend full hematuria workup with urine cytology, cystoscopy and CT urogram.  Will also go ahead today and send urine for culture to rule out infection as the cause.    Reports mild leakage with coughing-have provided patient information regarding Bulkamid injection-patient not interested at this time as states the leakage is not significant

## 2024-04-01 NOTE — TELEPHONE ENCOUNTER
Caller: Elizabeth Carrillo    Relationship: Self    Best call back number: [phone]     Requested Prescriptions:   Requested Prescriptions     Pending Prescriptions Disp Refills    pantoprazole (PROTONIX) 40 MG EC tablet 90 tablet 0     Sig: Take 1 tablet by mouth Daily.    pravastatin (PRAVACHOL) 10 MG tablet 90 tablet 3     Sig: Take 1 tablet by mouth Daily.    traZODone (DESYREL) 100 MG tablet 90 tablet 1     Sig: Take 1 tablet by mouth Every Night.    zolpidem (AMBIEN) 10 MG tablet 45 tablet 1     Sig: Take 1 tablet by mouth At Night As Needed for Sleep.        Pharmacy where request should be sent: [facility] [address] - [phone]  - [phone]     Last office visit with prescribing clinician: 3/14/2024   Last telemedicine visit with prescribing clinician: Visit date not found   Next office visit with prescribing clinician: 9/19/2024     Additional details provided by patient:     PATIENT HAS ONE MONTH LEFT OF PANTOPRAZOLE AND PRAVASTATIN.     PATIENT IS REQUESTING A 90 DAY SUPPLY OF THE REQUESTED PRESCRIPTIONS.    Does the patient have less than a 3 day supply:  [] Yes  [x] No    Would you like a call back once the refill request has been completed: [] Yes [] No    If the office needs to give you a call back, can they leave a voicemail: [] Yes [] No    Sohan Rincon   04/01/24 15:19 CDT

## 2024-04-02 RX ORDER — TRAZODONE HYDROCHLORIDE 100 MG/1
100 TABLET ORAL NIGHTLY
Qty: 90 TABLET | Refills: 1 | Status: SHIPPED | OUTPATIENT
Start: 2024-04-02

## 2024-04-02 RX ORDER — PANTOPRAZOLE SODIUM 40 MG/1
40 TABLET, DELAYED RELEASE ORAL DAILY
Qty: 90 TABLET | Refills: 0 | Status: SHIPPED | OUTPATIENT
Start: 2024-04-02

## 2024-04-02 RX ORDER — PRAVASTATIN SODIUM 10 MG
10 TABLET ORAL DAILY
Qty: 90 TABLET | Refills: 3 | Status: SHIPPED | OUTPATIENT
Start: 2024-04-02

## 2024-04-02 RX ORDER — ZOLPIDEM TARTRATE 10 MG/1
10 TABLET ORAL NIGHTLY PRN
Qty: 45 TABLET | Refills: 1 | Status: SHIPPED | OUTPATIENT
Start: 2024-04-02

## 2024-04-11 ENCOUNTER — OFFICE VISIT (OUTPATIENT)
Dept: UROLOGY | Facility: CLINIC | Age: 64
End: 2024-04-11
Payer: COMMERCIAL

## 2024-04-11 VITALS — HEIGHT: 67 IN | WEIGHT: 160 LBS | BODY MASS INDEX: 25.11 KG/M2 | TEMPERATURE: 97.4 F

## 2024-04-11 DIAGNOSIS — N39.3 STRESS INCONTINENCE: ICD-10-CM

## 2024-04-11 DIAGNOSIS — R31.0 GROSS HEMATURIA: Primary | ICD-10-CM

## 2024-04-11 LAB
BILIRUB BLD-MCNC: NEGATIVE MG/DL
CLARITY, POC: CLEAR
COLOR UR: YELLOW
GLUCOSE UR STRIP-MCNC: NEGATIVE MG/DL
KETONES UR QL: NEGATIVE
LEUKOCYTE EST, POC: ABNORMAL
NITRITE UR-MCNC: NEGATIVE MG/ML
PH UR: 5.5 [PH] (ref 5–8)
PROT UR STRIP-MCNC: NEGATIVE MG/DL
RBC # UR STRIP: ABNORMAL /UL
SP GR UR: 1.01 (ref 1–1.03)
UROBILINOGEN UR QL: ABNORMAL

## 2024-04-11 PROCEDURE — 87086 URINE CULTURE/COLONY COUNT: CPT

## 2024-04-12 ENCOUNTER — TELEPHONE (OUTPATIENT)
Dept: UROLOGY | Facility: CLINIC | Age: 64
End: 2024-04-12
Payer: MEDICARE

## 2024-04-12 NOTE — TELEPHONE ENCOUNTER
Patient called to see if she needed to do her other tests before we get her urine back. I let her know yes ma'am, that that is a different kind of test. Patient verbalized understanding.

## 2024-04-13 LAB — BACTERIA SPEC AEROBE CULT: NO GROWTH

## 2024-04-19 ENCOUNTER — TELEPHONE (OUTPATIENT)
Dept: INTERNAL MEDICINE | Facility: CLINIC | Age: 64
End: 2024-04-19

## 2024-04-19 NOTE — TELEPHONE ENCOUNTER
Caller: Elizabeth Carrillo    Relationship: Self    Who are you requesting to speak with     CLINICAL STAFF    Do you know the name of the person who called:     ELIZABETH    What was the call regarding:     INSURANCE IS ONLY SUPPLYING A 15 DAY SUPPLY. NEEDING A PRIOR AUTHORIZATION FOR A 30 DAY SUPPLY    Is it okay if the provider responds through [employer]:     NO  PLEASE CALL AND ADVISE PATIENT

## 2024-05-03 ENCOUNTER — HOSPITAL ENCOUNTER (OUTPATIENT)
Dept: CT IMAGING | Facility: HOSPITAL | Age: 64
Discharge: HOME | End: 2024-05-03
Payer: COMMERCIAL

## 2024-05-03 DIAGNOSIS — R31.0 GROSS HEMATURIA: ICD-10-CM

## 2024-05-03 LAB — CREAT BLDA-MCNC: 0.8 MG/DL (ref 0.6–1.3)

## 2024-05-03 PROCEDURE — 74178 CT ABD&PLV WO CNTR FLWD CNTR: CPT

## 2024-05-03 PROCEDURE — 82565 ASSAY OF CREATININE: CPT

## 2024-05-03 PROCEDURE — 25510000001 IOPAMIDOL 61 % SOLUTION

## 2024-05-03 RX ADMIN — IOPAMIDOL 100 ML: 612 INJECTION, SOLUTION INTRAVENOUS at 08:38

## 2024-05-07 ENCOUNTER — OFFICE VISIT (OUTPATIENT)
Dept: INTERNAL MEDICINE | Facility: CLINIC | Age: 64
End: 2024-05-07
Payer: MEDICARE

## 2024-05-07 VITALS
OXYGEN SATURATION: 96 % | WEIGHT: 156 LBS | BODY MASS INDEX: 24.48 KG/M2 | HEIGHT: 67 IN | DIASTOLIC BLOOD PRESSURE: 80 MMHG | HEART RATE: 81 BPM | TEMPERATURE: 97.7 F | SYSTOLIC BLOOD PRESSURE: 110 MMHG | RESPIRATION RATE: 18 BRPM

## 2024-05-07 DIAGNOSIS — Z87.891 PERSONAL HISTORY OF TOBACCO USE, PRESENTING HAZARDS TO HEALTH: ICD-10-CM

## 2024-05-07 DIAGNOSIS — J20.8 ACUTE BACTERIAL BRONCHITIS: Primary | ICD-10-CM

## 2024-05-07 DIAGNOSIS — R06.2 WHEEZING: ICD-10-CM

## 2024-05-07 DIAGNOSIS — B96.89 ACUTE BACTERIAL BRONCHITIS: Primary | ICD-10-CM

## 2024-05-07 DIAGNOSIS — Z87.891 HISTORY OF NICOTINE DEPENDENCE: ICD-10-CM

## 2024-05-07 DIAGNOSIS — J42 CHRONIC BRONCHITIS, UNSPECIFIED CHRONIC BRONCHITIS TYPE: ICD-10-CM

## 2024-05-07 PROCEDURE — 3074F SYST BP LT 130 MM HG: CPT

## 2024-05-07 PROCEDURE — 1159F MED LIST DOCD IN RCRD: CPT

## 2024-05-07 PROCEDURE — 1160F RVW MEDS BY RX/DR IN RCRD: CPT

## 2024-05-07 PROCEDURE — 96372 THER/PROPH/DIAG INJ SC/IM: CPT

## 2024-05-07 PROCEDURE — 3079F DIAST BP 80-89 MM HG: CPT

## 2024-05-07 PROCEDURE — 1125F AMNT PAIN NOTED PAIN PRSNT: CPT

## 2024-05-07 PROCEDURE — 99214 OFFICE O/P EST MOD 30 MIN: CPT

## 2024-05-07 RX ORDER — METHYLPREDNISOLONE SODIUM SUCCINATE 40 MG/ML
60 INJECTION, POWDER, LYOPHILIZED, FOR SOLUTION INTRAMUSCULAR; INTRAVENOUS ONCE
Status: COMPLETED | OUTPATIENT
Start: 2024-05-07 | End: 2024-05-07

## 2024-05-07 RX ORDER — MONTELUKAST SODIUM 10 MG/1
10 TABLET ORAL NIGHTLY
Qty: 90 TABLET | Refills: 3 | Status: SHIPPED | OUTPATIENT
Start: 2024-05-07

## 2024-05-07 RX ORDER — DOXYCYCLINE HYCLATE 100 MG/1
100 CAPSULE ORAL 2 TIMES DAILY
Qty: 14 CAPSULE | Refills: 0 | Status: SHIPPED | OUTPATIENT
Start: 2024-05-07 | End: 2024-05-14

## 2024-05-07 RX ORDER — GUAIFENESIN 600 MG/1
1200 TABLET, EXTENDED RELEASE ORAL 2 TIMES DAILY
Qty: 56 TABLET | Refills: 0 | Status: SHIPPED | OUTPATIENT
Start: 2024-05-07 | End: 2024-05-21

## 2024-05-07 RX ADMIN — METHYLPREDNISOLONE SODIUM SUCCINATE 60 MG: 40 INJECTION, POWDER, LYOPHILIZED, FOR SOLUTION INTRAMUSCULAR; INTRAVENOUS at 16:34

## 2024-05-30 ENCOUNTER — PROCEDURE VISIT (OUTPATIENT)
Dept: UROLOGY | Facility: CLINIC | Age: 64
End: 2024-05-30
Payer: MEDICARE

## 2024-05-30 DIAGNOSIS — R31.21 ASYMPTOMATIC MICROSCOPIC HEMATURIA: Primary | ICD-10-CM

## 2024-05-30 LAB
BILIRUB BLD-MCNC: NEGATIVE MG/DL
CLARITY, POC: CLEAR
COLOR UR: YELLOW
GLUCOSE UR STRIP-MCNC: NEGATIVE MG/DL
KETONES UR QL: NEGATIVE
LEUKOCYTE EST, POC: NEGATIVE
NITRITE UR-MCNC: NEGATIVE MG/ML
PH UR: 5 [PH] (ref 5–8)
PROT UR STRIP-MCNC: NEGATIVE MG/DL
RBC # UR STRIP: ABNORMAL /UL
SP GR UR: 1.03 (ref 1–1.03)
UROBILINOGEN UR QL: ABNORMAL

## 2024-05-30 NOTE — PROGRESS NOTES
CC: I am here for the doctor to look at my bladder    Cystoscopy procedure note  Pre- operative diagnosis:  Hematuria    Post operative diagnosis:  Same    CT ABDOMEN PELVIS W WO CONTRAST- - 5/3/2024 7:08 AM   IMPRESSION:  1. No acute or suspicious finding. Specifically, no hydronephrosis,  nephrolithiasis, or suspicious renal lesion. Normal delayed  opacification of the bilateral renal collecting system. Urinary bladder  decompressed, which partially limits evaluation.   This report was signed and finalized on 5/3/2024 8:52 AM by Dr Mira Judd MD.      Procedure:  The patient was prepped and draped in a normal sterile fashion.  The urethra was anesthetized with 2% lidocaine jelly.  A flexible cystoscope was introduced per urethra.   The urethra is normal in appearance without obstruction or mass.  The bladder is without evidence of mucosal lesion.   There is no abnormality of the urothelium.  There is minimal trabeculation of the detrusor muscle.  The ureteral orifices are orthotopic in position and they efflux clear urine.    Patient tolerated the procedure well    Complications: none    Blood loss: minimal       ASSESSMENT AND PLAN          Problem List Items Addressed This Visit    None  Visit Diagnoses       Asymptomatic microscopic hematuria    -  Primary    Relevant Orders    POC Urinalysis Dipstick, Multipro (Completed)        This patient has a long history of hematuria.  She had an evaluation by  in the distant past that was negative.  I am not convinced she has seen blood in her urine.  I think she is describing very concentrated urine.  Nonetheless she has had a negative evaluation.  I would not recommend further evaluation of this patient for hematuria unless she developed frankly gross hematuria.  Is emphasized to patient that urine dipstick studies are not quantitative.  In other words, if there is concern and the hematuria is increasing she should undergo a microscopic  urinalysis.    Return if symptoms worsen or fail to improve.      Ren West MD  5/31/2024  07:09 CDT

## 2024-06-20 ENCOUNTER — HOSPITAL ENCOUNTER (OUTPATIENT)
Dept: CT IMAGING | Facility: HOSPITAL | Age: 64
Discharge: HOME | End: 2024-06-20
Payer: COMMERCIAL

## 2024-06-20 ENCOUNTER — TELEPHONE (OUTPATIENT)
Dept: INTERNAL MEDICINE | Facility: CLINIC | Age: 64
End: 2024-06-20
Payer: MEDICARE

## 2024-06-20 ENCOUNTER — HOSPITAL ENCOUNTER (OUTPATIENT)
Dept: PULMONOLOGY | Facility: HOSPITAL | Age: 64
Discharge: HOME | End: 2024-06-20
Payer: COMMERCIAL

## 2024-06-20 DIAGNOSIS — R06.2 WHEEZING: Primary | ICD-10-CM

## 2024-06-20 DIAGNOSIS — J42 CHRONIC BRONCHITIS, UNSPECIFIED CHRONIC BRONCHITIS TYPE: ICD-10-CM

## 2024-06-20 DIAGNOSIS — J06.9 VIRAL UPPER RESPIRATORY TRACT INFECTION: ICD-10-CM

## 2024-06-20 DIAGNOSIS — R06.2 WHEEZING: ICD-10-CM

## 2024-06-20 DIAGNOSIS — Z87.891 PERSONAL HISTORY OF TOBACCO USE, PRESENTING HAZARDS TO HEALTH: ICD-10-CM

## 2024-06-20 PROCEDURE — 71271 CT THORAX LUNG CANCER SCR C-: CPT

## 2024-06-20 PROCEDURE — 94726 PLETHYSMOGRAPHY LUNG VOLUMES: CPT

## 2024-06-20 PROCEDURE — 94060 EVALUATION OF WHEEZING: CPT

## 2024-06-20 PROCEDURE — 94729 DIFFUSING CAPACITY: CPT

## 2024-06-20 RX ORDER — ALBUTEROL SULFATE 90 UG/1
2 AEROSOL, METERED RESPIRATORY (INHALATION) EVERY 4 HOURS PRN
Qty: 18 G | Refills: 3 | Status: SHIPPED | OUTPATIENT
Start: 2024-06-20

## 2024-06-20 RX ORDER — FLUTICASONE PROPIONATE AND SALMETEROL 250; 50 UG/1; UG/1
1 POWDER RESPIRATORY (INHALATION)
Qty: 60 EACH | Refills: 2 | Status: SHIPPED | OUTPATIENT
Start: 2024-06-20

## 2024-06-20 RX ORDER — ALBUTEROL SULFATE 2.5 MG/3ML
2.5 SOLUTION RESPIRATORY (INHALATION) ONCE
Status: COMPLETED | OUTPATIENT
Start: 2024-06-20 | End: 2024-06-20

## 2024-06-20 RX ADMIN — ALBUTEROL SULFATE 2.5 MG: 2.5 SOLUTION RESPIRATORY (INHALATION) at 09:14

## 2024-06-20 NOTE — TELEPHONE ENCOUNTER
----- Message from Brittney Cid sent at 6/20/2024  8:43 AM CDT -----    CT chest showed no suspicious pulmonary nodules.  Repeat in 1 year.    Notified patient of results, patient voiced understanding.

## 2024-07-02 DIAGNOSIS — G47.00 INSOMNIA, UNSPECIFIED TYPE: ICD-10-CM

## 2024-07-03 RX ORDER — ZOLPIDEM TARTRATE 10 MG/1
10 TABLET ORAL NIGHTLY PRN
Qty: 30 TABLET | Refills: 2 | Status: SHIPPED | OUTPATIENT
Start: 2024-07-20

## 2024-08-26 DIAGNOSIS — B37.31 VULVOVAGINAL CANDIDIASIS: ICD-10-CM

## 2024-08-26 RX ORDER — FLUCONAZOLE 150 MG/1
150 TABLET ORAL DAILY
Qty: 2 TABLET | Refills: 0 | Status: SHIPPED | OUTPATIENT
Start: 2024-08-26

## 2024-08-26 NOTE — TELEPHONE ENCOUNTER
Caller: Elizabeth Carrillo    Relationship: Self    Best call back number: [phone]    What medication are you requesting: DIFLUCAN       What are your current symptoms: YEAST INFECTION      How long have you been experiencing symptoms: STARTED ABOUT A WEEK     Have you had these symptoms before:    [x] Yes  [] No    Have you been treated for these symptoms before:   [x] Yes  [] No    If a prescription is needed, what is your preferred pharmacy and phone number: [facility] - [facility] [address] [place] [address] - [phone]  - [phone] FX     ADDITIONAL NOTES:  PATIENT STATES  SHE NEEDS 2 PILLS

## 2024-09-13 RX ORDER — LISINOPRIL 40 MG/1
40 TABLET ORAL DAILY
Qty: 90 TABLET | Refills: 3 | Status: SHIPPED | OUTPATIENT
Start: 2024-09-13

## 2024-09-13 RX ORDER — LISINOPRIL 40 MG/1
40 TABLET ORAL DAILY
Qty: 30 TABLET | Refills: 5 | OUTPATIENT
Start: 2024-09-13

## 2024-09-13 NOTE — TELEPHONE ENCOUNTER
Caller: Elizabeth Carrillo TANESHA    Relationship: Self    Best call back number: [phone]    Requested Prescriptions:   Requested Prescriptions     Pending Prescriptions Disp Refills    lisinopril (PRINIVIL,ZESTRIL) 40 MG tablet 90 tablet 3     Sig: Take 1 tablet by mouth Daily.        Pharmacy where request should be sent: [address] - [phone]  - [phone] FX     Last office visit with prescribing clinician: 3/14/2024   Last telemedicine visit with prescribing clinician: Visit date not found   Next office visit with prescribing clinician: 9/19/2024   Does the patient have less than a 3 day supply:  [x] Yes  [] No    Would you like a call back once the refill request has been completed: [] Yes [] No    If the office needs to give you a call back, can they leave a voicemail: [] Yes [] No    Sohan Espinoza Rep   09/13/24 10:15 CDT

## 2024-09-19 ENCOUNTER — OFFICE VISIT (OUTPATIENT)
Dept: INTERNAL MEDICINE | Facility: CLINIC | Age: 64
End: 2024-09-19
Payer: MEDICARE

## 2024-09-19 VITALS
TEMPERATURE: 97.7 F | HEART RATE: 85 BPM | HEIGHT: 67 IN | WEIGHT: 156 LBS | SYSTOLIC BLOOD PRESSURE: 108 MMHG | DIASTOLIC BLOOD PRESSURE: 68 MMHG | OXYGEN SATURATION: 96 % | BODY MASS INDEX: 24.48 KG/M2

## 2024-09-19 DIAGNOSIS — R73.03 PREDIABETES: ICD-10-CM

## 2024-09-19 DIAGNOSIS — I10 ESSENTIAL HYPERTENSION: Primary | ICD-10-CM

## 2024-09-19 DIAGNOSIS — Z79.899 ENCOUNTER FOR LONG-TERM (CURRENT) USE OF OTHER MEDICATIONS: ICD-10-CM

## 2024-09-19 DIAGNOSIS — G47.00 INSOMNIA, UNSPECIFIED TYPE: ICD-10-CM

## 2024-09-19 DIAGNOSIS — K21.9 GASTROESOPHAGEAL REFLUX DISEASE, UNSPECIFIED WHETHER ESOPHAGITIS PRESENT: ICD-10-CM

## 2024-09-19 RX ORDER — TRAZODONE HYDROCHLORIDE 100 MG/1
100 TABLET ORAL NIGHTLY
Qty: 90 TABLET | Refills: 2 | Status: SHIPPED | OUTPATIENT
Start: 2024-09-19

## 2024-09-19 RX ORDER — PANTOPRAZOLE SODIUM 40 MG/1
40 TABLET, DELAYED RELEASE ORAL DAILY
Qty: 90 TABLET | Refills: 2 | Status: SHIPPED | OUTPATIENT
Start: 2024-09-19

## 2024-09-19 RX ORDER — ZOLPIDEM TARTRATE 10 MG/1
10 TABLET ORAL NIGHTLY PRN
Qty: 30 TABLET | Refills: 2 | Status: SHIPPED | OUTPATIENT
Start: 2024-09-19

## 2024-09-19 RX ORDER — PRAVASTATIN SODIUM 10 MG
10 TABLET ORAL DAILY
Qty: 90 TABLET | Refills: 3 | Status: SHIPPED | OUTPATIENT
Start: 2024-09-19

## 2024-09-25 NOTE — PROGRESS NOTES
Subjective     Chief Complaint:  Eye drainage.  Ongoing cough.    HPI:  Patient presents to the office today with complaints of eye drainage and irritation that started this morning.  She also complains of an ongoing cough.  She has been treated 3 times since November for upper respiratory infections/bronchitis.  Please see assessment and plan below.    Patient's PMR from outside medical facility reviewed and noted.    Past Medical History:   Past Medical History:   Diagnosis Date    ASCUS with positive high risk HPV     Dysmenorrhea     Endometrial thickening on ultra sound     Follow-up examination after gynecological surgery     Menopausal symptoms     Menorrhagia     Pelvic pain     Vaginitis      Past Surgical History:  Past Surgical History:   Procedure Laterality Date    APPENDECTOMY N/A 11/14/2019    Procedure: APPENDECTOMY LAPAROSCOPIC POSSIBLE OPEN;  Surgeon: Angeles Gallagher MD;  Location: [facility] OR;  Service: General    CHOLECYSTECTOMY      COLONOSCOPY N/A 2/20/2020    Procedure: COLONOSCOPY WITH ANESTHESIA;  Surgeon: Thaddeus Meraz DO;  Location: [facility] ENDOSCOPY;  Service: Gastroenterology;  Laterality: N/A;  preop; abdominal pain  postop; diverticulosis   PCP Jason Cuello     DILATATION AND CURETTAGE      of uterus     Social History:  reports that she has been smoking cigarettes. She started smoking about 40 years ago. She has a 40.00 pack-year smoking history. She has never used smokeless tobacco. She reports current alcohol use. She reports that she does not use drugs.    Family History: family history includes Diabetes in her father and mother.      Allergies:  Allergies   Allergen Reactions    Codeine Nausea And Vomiting     Medications:  Prior to Admission medications    Medication Sig Start Date End Date Taking? Authorizing Provider   albuterol sulfate  (90 Base) MCG/ACT inhaler Inhale 2 puffs Every 4 (Four) Hours As Needed for Wheezing. 1/16/24  Yes Brittney Cid, APRN  Subjective     [place] is a 4 y.o. male here with mother. Patient brought in for frequent accidents (BM, on and off the past month -- looser stools reported by mom no changes in diet reported /Consistent belly pain x 1 month )      History of Present Illness:  HPI    Stool accidents for about a month off/on.  Picky: mostly nuggets, ramen, pizza. Does eat some fruits.  Has had large stools, but more lately wet stools.        Review of Systems   Constitutional:  Negative for activity change, appetite change and fever.   Gastrointestinal:  Negative for blood in stool.          Objective     Physical Exam  Constitutional:       General: He is not in acute distress.     Appearance: He is not ill-appearing.   Pulmonary:      Effort: Pulmonary effort is normal.   Abdominal:      General: There is no distension.      Palpations: Abdomen is soft. There is no mass.      Tenderness: There is no abdominal tenderness.   Neurological:      Mental Status: He is alert.   Psychiatric:         Behavior: Behavior is cooperative.            Assessment and Plan     1. Abdominal pain, generalized    2. Incontinence of feces, unspecified fecal incontinence type    3. Constipation, unspecified constipation type    4. Picky eater        Plan:    South was seen today for frequent accidents.    Diagnoses and all orders for this visit:    Abdominal pain, generalized  -     X-Ray Abdomen AP 1 View; Future    Incontinence of feces, unspecified fecal incontinence type  -     X-Ray Abdomen AP 1 View; Future    Constipation, unspecified constipation type  -     polyethylene glycol (GLYCOLAX) 17 gram/dose powder; half capful in 4-8 oz water or juice once daily as needed for constipation    Picky eater            "  cetirizine (zyrTEC) 10 MG tablet Take 1 tablet by mouth Daily.   Yes Provider, MD Jose   fluticasone (FLONASE) 50 MCG/ACT nasal spray 2 sprays into the nostril(s) as directed by provider Daily. 9/14/23  Yes Delroy Larson MD   lisinopril (PRINIVIL,ZESTRIL) 40 MG tablet TAKE 1 TABLET BY MOUTH DAILY. 8/29/23  Yes Delroy Larson MD   pantoprazole (PROTONIX) 40 MG EC tablet Take 1 tablet by mouth Daily. 1/31/24  Yes Brittney Cid APRN   pravastatin (PRAVACHOL) 10 MG tablet TAKE 1 TABLET BY MOUTH DAILY. 6/22/23  Yes Delroy Larson MD   traZODone (DESYREL) 100 MG tablet TAKE 1 TABLET BY MOUTH EVERY NIGHT. 11/14/23  Yes MOHINDER Frausto DO   zolpidem (AMBIEN) 10 MG tablet Take 1 tablet by mouth At Night As Needed for Sleep. 2/12/24  Yes Delroy Larson MD   guaiFENesin (Mucinex) 600 MG 12 hr tablet Take 2 tablets by mouth 2 (Two) Times a Day for 10 days. 2/22/24 3/3/24  Rosette Gama APRN   tobramycin-dexAMETHasone (TobraDex) 0.3-0.1 % ophthalmic suspension Administer 1 drop to both eyes Every 4 (Four) Hours While Awake for 5 days. 2/22/24 2/27/24  Rosette Gama APRN       Objective     Vital Signs: /74 (BP Location: Left arm, Patient Position: Sitting, Cuff Size: Adult)   Pulse 80   Temp 97.8 °F (36.6 °C) (Temporal)   Ht 170.2 cm (67.01\")   Wt 70.3 kg (155 lb)   SpO2 97%   BMI 24.27 kg/m²   Physical Exam  Vitals and nursing note reviewed.   Constitutional:       General: She is not in acute distress.     Appearance: She is not ill-appearing or toxic-appearing.   HENT:      Head: Normocephalic and atraumatic.      Mouth/Throat:      Mouth: Mucous membranes are moist.      Pharynx: Oropharynx is clear.   Eyes:      General:         Right eye: Discharge present.         Left eye: Discharge present.     Conjunctiva/sclera:      Right eye: Right conjunctiva is injected.      Left eye: Left conjunctiva is injected.   Cardiovascular:      Rate and Rhythm: Normal rate " and regular rhythm.      Pulses: Normal pulses.      Heart sounds: Normal heart sounds.   Pulmonary:      Effort: Pulmonary effort is normal.      Breath sounds: No wheezing, rhonchi or rales.   Abdominal:      General: Bowel sounds are normal. There is no distension.      Palpations: Abdomen is soft.      Tenderness: There is no abdominal tenderness.   Musculoskeletal:         General: No swelling or tenderness. Normal range of motion.      Cervical back: Normal range of motion and neck supple. No tenderness.   Skin:     General: Skin is warm and dry.      Findings: No erythema or rash.   Neurological:      General: No focal deficit present.      Mental Status: She is alert and oriented to person, place, and time.   Psychiatric:         Mood and Affect: Mood normal.         Behavior: Behavior normal.         Thought Content: Thought content normal.         Judgment: Judgment normal.       BMI is within normal parameters. No other follow-up for BMI required.    Results Reviewed:  Office Visit with Brittney Cid APRN (11/14/2023)   Office Visit with Brittney Cid APRN (01/16/2024)   Office Visit with Brittney Cid APRN (01/31/2024)     Assessment / Plan     Assessment/Plan:  Diagnoses and all orders for this visit:    1. Bronchitis, mucopurulent recurrent (Primary)  -     XR Chest PA & Lateral; Future  -     guaiFENesin (Mucinex) 600 MG 12 hr tablet; Take 2 tablets by mouth 2 (Two) Times a Day for 10 days.  Dispense: 40 tablet; Refill: 0    2. Acute bacterial conjunctivitis of both eyes  -     tobramycin-dexAMETHasone (TobraDex) 0.3-0.1 % ophthalmic suspension; Administer 1 drop to both eyes Every 4 (Four) Hours While Awake for 5 days.  Dispense: 2 mL; Refill: 0       Patient presents to the office today with complaints of bilateral eye drainage and irritation which started this morning that has progressively worsened throughout the day.  She does note that her daughter-in-law had pinkeye last week.  She  "does have copious purulent drainage coming from each eye.  She does wear glasses, does not wear contacts.  Her exam does appear to be consistent with conjunctivitis.  We discussed the use of warm compresses for 10 to 15-minute increments as tolerated.  Prescribed TobraDex drops every 4 hours while awake for 5 days.  Patient to call the office for no improvement or worsening of symptoms.    Patient also complains of an ongoing cough productive of green sputum.  She notes the cough does seem to be worse at night.  She denies any shortness of breath.  She tells me she is \"85% better\", but states symptoms have been ongoing and have never truly resolved since she was seen in November.  On 11/14, she was treated for upper respiratory infection with Augmentin, Kenalog injection and Tessalon Perles.  She was seen again on 1/16 for similar symptoms and treated with a Z-Moshe, prednisone and given an albuterol inhaler.  She tested negative for COVID and flu at that time.  She was then seen on 1/31 with complaints of malodorous urine and at that time was complaining of ongoing cough with sputum production.  She was treated for urinary tract infection with a course of Omnicef, which should cover respiratory pathogens as well and was also given a Solu-Medrol injection.  I would be very hesitant to give her any further antibiotic therapy at this point in time since she is slowly improving, but I believe getting a chest x-ray would be reasonable.  I have sent her in a course of Mucinex, and we discussed increasing water intake to help facilitate sputum expectoration.  She can use Delsym as needed to suppress her cough, especially at night as this is when it is worsening.  She does note that her reflux symptoms have improved after being placed on Protonix, but she has not necessarily noted improvement in her cough since being placed on this medication.    Return for Next scheduled follow up. unless patient needs to be seen sooner or " acute issues arise.    I have discussed the patient results/orders and and plan/recommendation with them at today's visit.      Rosette Gama, APRN   02/22/2024

## 2024-11-13 ENCOUNTER — TELEPHONE (OUTPATIENT)
Dept: INTERNAL MEDICINE | Facility: CLINIC | Age: 64
End: 2024-11-13
Payer: MEDICARE

## 2024-11-13 NOTE — TELEPHONE ENCOUNTER
Patient asking for order for foot xray, thinks it may be broken, was working in gravel and injured it

## 2024-11-18 ENCOUNTER — TELEPHONE (OUTPATIENT)
Dept: INTERNAL MEDICINE | Facility: CLINIC | Age: 64
End: 2024-11-18

## 2024-11-18 DIAGNOSIS — S92.351A CLOSED DISPLACED FRACTURE OF FIFTH METATARSAL BONE OF RIGHT FOOT, INITIAL ENCOUNTER: Primary | ICD-10-CM

## 2024-11-18 NOTE — PROGRESS NOTES
[facility] - PODIATRY    Today's Date: 11/20/2024     Patient Name: Elizabeth Carrillo  MRN: [id number]  CSN: [id number]  PCP: Delroy Larson MD  Referring Provider: Delroy Larson MD    SUBJECTIVE     Chief Complaint   Patient presents with    Establish Care     Delroy Larson MD 09/19/2024 NEW PT- Closed displaced fracture of fifth metatarsal bone of right foot- pt states  11/08/2024 rolled ankle and broke 5th toe. Is doing better since being in shoe, which she ordered off [employer]. Insurance wouldn't cover a boot- pt pain 5/10 at worst, depending, more uncomfortable      HPI: Elizabeth Carrillo, a 64 y.o.female, comes to clinic as a(n) new patient  closed displaced fracture of the right fifth metatarsal  . Patient has h/o dysmenorrhea, hypertension  .  Presents with fracture to the right metatarsal.  She reports that 2 weeks ago she fell in someone's yard and twisted her foot.  She also obtain x-rays approximately 1 week ago.  Patient had a prescription sent in for a walking boot, but went to pick it up and this boot was not covered by insurance.  Since this time she has purchased a surgical shoe from [employer] and is currently wearing this shoe.  Patient is non-diabetic. Admits pain at 5/10 level and centered around right foot . Denies previous treatment. Denies any constitutional symptoms. No other pedal complaints at this time.    Past Medical History:   Diagnosis Date    ASCUS with positive high risk HPV     Dysmenorrhea     Endometrial thickening on ultra sound     Follow-up examination after gynecological surgery     Menopausal symptoms     Menorrhagia     Pelvic pain     Vaginitis      Past Surgical History:   Procedure Laterality Date    APPENDECTOMY N/A 11/14/2019    Procedure: APPENDECTOMY LAPAROSCOPIC POSSIBLE OPEN;  Surgeon: Angeles Gallagher MD;  Location: [facility];  Service: General    CHOLECYSTECTOMY      COLONOSCOPY N/A 2/20/2020    Procedure: COLONOSCOPY WITH ANESTHESIA;   Surgeon: Thaddeus Meraz DO;  Location: [facility] ENDOSCOPY;  Service: Gastroenterology;  Laterality: N/A;  preop; abdominal pain  postop; diverticulosis   PCP Jason Cuello     DILATATION AND CURETTAGE      of uterus     Family History   Problem Relation Age of Onset    Diabetes Mother     Diabetes Father     Colon cancer Neg Hx     Colon polyps Neg Hx      Social History     Socioeconomic History    Marital status:    Tobacco Use    Smoking status: Every Day     Current packs/day: 1.00     Average packs/day: 1 pack/day for 40.9 years (40.9 ttl pk-yrs)     Types: Cigarettes     Start date: 1984    Smokeless tobacco: Never   Vaping Use    Vaping status: Former   Substance and Sexual Activity    Alcohol use: Yes     Comment: occasional    Drug use: No    Sexual activity: Not Currently     Allergies   Allergen Reactions    Codeine Nausea And Vomiting     Current Outpatient Medications   Medication Sig Dispense Refill    cetirizine (zyrTEC) 10 MG tablet Take 1 tablet by mouth Daily.      fluticasone (FLONASE) 50 MCG/ACT nasal spray 2 sprays into the nostril(s) as directed by provider Daily. 9.9 mL 2    lisinopril (PRINIVIL,ZESTRIL) 40 MG tablet Take 1 tablet by mouth Daily. 90 tablet 3    pantoprazole (PROTONIX) 40 MG EC tablet Take 1 tablet by mouth Daily. 90 tablet 2    pravastatin (PRAVACHOL) 10 MG tablet Take 1 tablet by mouth Daily. 90 tablet 3    traZODone (DESYREL) 100 MG tablet Take 1 tablet by mouth Every Night. 90 tablet 2    zolpidem (AMBIEN) 10 MG tablet Take 1 tablet by mouth At Night As Needed for Sleep. 30 tablet 2    albuterol sulfate  (90 Base) MCG/ACT inhaler Inhale 2 puffs Every 4 (Four) Hours As Needed for Wheezing. (Patient not taking: Reported on 11/20/2024) 18 g 3     No current facility-administered medications for this visit.     Review of Systems   Constitutional:  Negative for chills and fever.   HENT:  Negative for congestion.    Respiratory:  Negative for shortness of breath.     Cardiovascular:  Negative for chest pain and leg swelling.   Gastrointestinal:  Negative for constipation, diarrhea, nausea and vomiting.   Musculoskeletal:  Positive for arthralgias (right foot).   Skin:  Negative for wound.   Neurological:  Negative for numbness.       OBJECTIVE     Vitals:    24 1316   BP: 120/76   Pulse: 85   SpO2: 98%       PHYSICAL EXAM  GEN:   Accompanied by none.     Foot/Ankle Exam    GENERAL  Appearance:  appears stated age  Orientation:  AAOx3  Affect:  appropriate  Gait:  unimpaired  Assistance:  independent  Right shoe gear: surgical shoe  Left shoe gear: casual shoe    VASCULAR     Right Foot Vascularity   Dorsalis pedis:  2+  Posterior tibial:  2+  Skin temperature:  warm  Edema gradin+ and pitting  CFT:  3  Pedal hair growth:  Present  Varicosities:  none     Left Foot Vascularity   Dorsalis pedis:  2+  Posterior tibial:  2+  Skin temperature:  warm  Edema grading:  None  CFT:  3  Pedal hair growth:  Present  Varicosities:  none     NEUROLOGIC     Right Foot Neurologic   Normal sensation    Light touch sensation: normal  Vibratory sensation: normal  Hot/Cold sensation: normal  Protective Sensation using Mackay-Be Monofilament:   Sites intact: 10  Sites tested: 10     Left Foot Neurologic   Normal sensation    Light touch sensation: normal  Vibratory sensation: normal  Hot/Cold sensation:  normal  Protective Sensation using Mackay-Be Monofilament:   Sites intact: 10  Sites tested: 10    MUSCULOSKELETAL     Right Foot Musculoskeletal   Ecchymosis:  metatarsal 2, metatarsal 3 and metatarsal 5  Tenderness:  metatarsal 2, metatarsal 3 and metatarsal 5    Arch:  Normal     Left Foot Musculoskeletal   Tenderness:  none  Arch:  Normal    MUSCLE STRENGTH     Right Foot Muscle Strength   Foot dorsiflexion:  5  Foot plantar flexion:  5  Foot inversion:  5  Foot eversion:  5     Left Foot Muscle Strength   Foot dorsiflexion:  5  Foot plantar flexion:  5  Foot  inversion:  5  Foot eversion:  5    RANGE OF MOTION     Right Foot Range of Motion   Foot and ankle ROM within normal limits       Left Foot Range of Motion   Foot and ankle ROM within normal limits      DERMATOLOGIC      Right Foot Dermatologic   Skin  Positive for erythema.      Left Foot Dermatologic   Skin  Left foot skin is intact.       RADIOLOGY/NUCLEAR:  No results found.    LABORATORY/CULTURE RESULTS:      PATHOLOGY RESULTS:       ASSESSMENT/PLAN     Diagnoses and all orders for this visit:    1. Closed displaced fracture of fifth metatarsal bone of right foot, initial encounter (Primary)  -     Miscellaneous DME  -     Miscellaneous DME  -     XR Foot 3+ View Right; Future    2. Right foot pain    3. Localized swelling of right foot      Comprehensive lower extremity examination and evaluation was performed.  Discussed findings and treatment plan including risks, benefits, and treatment options with patient in detail. Patient agreed with treatment plan.  Discussed with patient the importance of staying NWB until next appointment. Patient was given a script for crutches and a knee scooter.  Patient will continue to wear surgical shoe for protection.   Patient will obtain new xrays before next appointment to evaluate healing.  Elevate foot when at rest.  Patient may maintain nails and calluses at home utilizing emery board or pumice stone between visits as needed   Discussed the importance of smoking cessation to help with bone healing.    An After Visit Summary was printed and given to the patient at discharge, including (if requested) any available informative/educational handouts regarding diagnosis, treatment, or medications. All questions were answered to patient/family satisfaction. Should symptoms fail to improve or worsen they agree to call or return to clinic or to go to the Emergency Department. Discussed the importance of following up with any needed screening tests/labs/specialist appointments  and any requested follow-up recommended by me today. Importance of maintaining follow-up discussed and patient accepts that missed appointments can delay diagnosis and potentially lead to worsening of conditions.  Return in about 1 month (around 12/20/2024) for Follow-up with Ellen Alejandro., or sooner if acute issues arise.      This document has been electronically signed by GIOVANNY Fitzgerald on November 20, 2024 16:33 CST

## 2024-11-18 NOTE — TELEPHONE ENCOUNTER
Caller: Elizabeth Carrillo    Relationship: Self    Best call back number: [phone]     Caller requesting test results: PATIENT     What test was performed: XRAY OF FOOT     When was the test performed: 11.14.24    Where was the test performed: [facility]     Additional notes:

## 2024-11-20 ENCOUNTER — OFFICE VISIT (OUTPATIENT)
Age: 64
End: 2024-11-20
Payer: COMMERCIAL

## 2024-11-20 VITALS
DIASTOLIC BLOOD PRESSURE: 76 MMHG | WEIGHT: 157 LBS | HEIGHT: 67 IN | HEART RATE: 85 BPM | BODY MASS INDEX: 24.64 KG/M2 | SYSTOLIC BLOOD PRESSURE: 120 MMHG | OXYGEN SATURATION: 98 %

## 2024-11-20 DIAGNOSIS — R22.41 LOCALIZED SWELLING OF RIGHT FOOT: ICD-10-CM

## 2024-11-20 DIAGNOSIS — M79.671 RIGHT FOOT PAIN: ICD-10-CM

## 2024-11-20 DIAGNOSIS — Z72.0 TOBACCO ABUSE: ICD-10-CM

## 2024-11-20 DIAGNOSIS — S92.351A CLOSED DISPLACED FRACTURE OF FIFTH METATARSAL BONE OF RIGHT FOOT, INITIAL ENCOUNTER: Primary | ICD-10-CM

## 2024-11-22 ENCOUNTER — TELEPHONE (OUTPATIENT)
Age: 64
End: 2024-11-22

## 2024-11-22 ENCOUNTER — PATIENT ROUNDING (BHMG ONLY) (OUTPATIENT)
Age: 64
End: 2024-11-22
Payer: MEDICARE

## 2024-11-22 NOTE — TELEPHONE ENCOUNTER
Okay to schedule first available appointment with Jonathan BOSWELL, Marilin BOSWELL, or Evgeny Fragoso MD.

## 2024-11-22 NOTE — TELEPHONE ENCOUNTER
FX Sheet    [facility] (Patient unsure what day)  Displaced fracture of fifth metatarsal bone, right foot, initial encounter for closed fracture    NO Splint  Yes Xr  No MRI  BCBS    Patient originally injured foot on 11/7, did not have an xray until a week later.  She was not given a boot or any kind of splint.  She was told to stay off of it, but was then ushered out the hospital doors without any kind of support to keep weight off of foot.  She asks that she be seen as soon as possible in fear of having done more damage.  She requests Dr Katie diaz.

## 2024-11-22 NOTE — PROGRESS NOTES
November 22, 2024    Hello, may I speak with Elizabeth Carrillo?    My name is geoff      I am  with [facility]  [address]  [facility] [address] [phone].    Before we get started may I verify your date of birth? 1960    I am calling to officially welcome you to our practice and ask about your recent visit. Is this a good time to talk?     Tell me about your visit with us. What things went well?         We're always looking for ways to make our patients' experiences even better. Do you have recommendations on ways we may improve?      Overall were you satisfied with your first visit to our practice?        I appreciate you taking the time to speak with me today. Is there anything else I can do for you?       Thank you, and have a great day.    Left voice mail

## 2024-11-25 ENCOUNTER — TELEPHONE (OUTPATIENT)
Age: 64
End: 2024-11-25

## 2024-11-25 NOTE — TELEPHONE ENCOUNTER
Pt had an appointment today 11/25 at 10:10. Pt states no one called her about the appointment and she just called to get in ASAP with dr nolen for broken foot 5th metatarsal. Please call to reschedule pt asap. She has no idea about the appointment today. [phone]

## 2024-11-25 NOTE — TELEPHONE ENCOUNTER
Called patient and got her scheduled to see Jonathan BOSWELL 10/26/2024 at 8:00 am. Patient was upset when I spoke with her because she never get notification of the appointment and was upset that she missed it but not upset with the clinic.

## 2024-11-26 ENCOUNTER — OFFICE VISIT (OUTPATIENT)
Age: 64
End: 2024-11-26

## 2024-11-26 VITALS — WEIGHT: 158 LBS | HEIGHT: 66 IN | BODY MASS INDEX: 25.39 KG/M2

## 2024-11-26 DIAGNOSIS — S92.351A CLOSED DISPLACED FRACTURE OF FIFTH METATARSAL BONE OF RIGHT FOOT, INITIAL ENCOUNTER: ICD-10-CM

## 2024-11-26 DIAGNOSIS — M79.671 RIGHT FOOT PAIN: Primary | ICD-10-CM

## 2024-11-26 RX ORDER — TRAZODONE HYDROCHLORIDE 100 MG/1
100 TABLET ORAL NIGHTLY
COMMUNITY
Start: 2024-09-19

## 2024-11-26 RX ORDER — PANTOPRAZOLE SODIUM 40 MG/1
40 TABLET, DELAYED RELEASE ORAL DAILY
COMMUNITY
Start: 2024-09-19

## 2024-11-26 RX ORDER — CETIRIZINE HYDROCHLORIDE 10 MG/1
10 TABLET ORAL DAILY
COMMUNITY

## 2024-11-26 RX ORDER — LISINOPRIL 40 MG/1
40 TABLET ORAL DAILY
COMMUNITY
Start: 2024-09-13

## 2024-11-26 RX ORDER — ZOLPIDEM TARTRATE 10 MG/1
10 TABLET ORAL NIGHTLY PRN
COMMUNITY
Start: 2024-09-19

## 2024-11-26 RX ORDER — PRAVASTATIN SODIUM 10 MG
10 TABLET ORAL DAILY
COMMUNITY
Start: 2024-09-19

## 2024-11-26 NOTE — PROGRESS NOTES
reviewed. No pertinent family history.    Review of Systems  System  Neg/Pos  Details  Constitutional  Negative  Chills, Fatigue, Fever and Night Sweats  Respiratory  Negative  Chest Pain, Cough and Dyspnea  Cardio   Negative  Leg Swelling  GI   Negative  Abdominal Pain, Constipation, Nausea and Vomiting     Negative  Urinary Incontinence   Endocrine  Negative  Weight Gain and Weight Loss  MS   Negative  Except as noted in HPI and Chief Complaint    PHYSICAL EXAM:    Vitals:   Vitals:    11/26/24 0829   Weight: 71.7 kg (158 lb)   Height: 1.676 m (5' 6\")     General:  Appears stated age, no distress.  Orientation:  Alert and oriented to time, place, and person.  Mood and Affect:  Cooperative and pleasant.  Gait: Heel to gait  Cardiovascular:  Symmetric 1-2 plus pulses in upper and lower extremities.  Lymph:  No cervical or inguinal lymphadenopathy noted.  Sensation:  Grossly intact to light touch.  DTR:  Normal, no pathologic reflexes.  Coordination/balance:  Normal    Musculoskeletal:   Right foot:  Tender to palpation over fifth metatarsal  Mild swelling over fifth metatarsal  Motion: decreased due to pain  Strength:  4/5  Skin: intact  Bobby present    Radiology:  Right foot x-ray, [facility] (11/14/2024)  AP, lateral, and oblique views of the right foot were performed at an outside facility at [facility], have been reviewed and interpreted by me.  The radiology report has been reviewed as well.  Imaging quality is adequate for review.  Bone and tissue quality are normal.  Joint spaces are well maintained.  There is a mildly displaced fracture at the base of the fifth metatarsal.    Right ankle x-ray, [facility] (11/14/2024)  AP, lateral, and oblique views of the right ankle were performed at [facility], have been reviewed and interpreted by me.  The radiology report has been reviewed as well.  Imaging quality is adequate for review. There are no fractures or dislocations present.  Bone and tissue quality are

## 2024-12-02 ENCOUNTER — TELEPHONE (OUTPATIENT)
Age: 64
End: 2024-12-02

## 2024-12-02 NOTE — TELEPHONE ENCOUNTER
Pt is calling requesting to speak to someone in clinic. She states she has questions about instructions she is supposed to be following from last visit.

## 2024-12-02 NOTE — TELEPHONE ENCOUNTER
Jonathan Munguia PA  [employer] [facility] (2:00 PM)       Patient can begin heel walking at 4 to 5 weeks post injury (4 weeks would be 12/5/2024). She should not be bearing weight and attempting to walk normally until her next follow up. But heel walking should not cause problems for her-if we can stress/differentiate between regular weightbearing and heel walking. I will see her back in 3 weeks for repeat xrays and re-evaluation. Hope that clarifies.Thanks!

## 2024-12-02 NOTE — TELEPHONE ENCOUNTER
Patient wants carnification on when she can start using the boot and heal walking as she is wanting to get back to work. She thought she hear Jonathan say 1 week non weightbearing but per the plan it says 4-5 weeks non weightbearing and follow up in 3 weeks.    Jonathan can you please clarify for patient.

## 2024-12-02 NOTE — TELEPHONE ENCOUNTER
Called patient and relayed Jonathan's message to her. She stated understanding and appreciation for the clarification. No further questions or concerns at this time.

## 2024-12-12 ENCOUNTER — TELEPHONE (OUTPATIENT)
Age: 64
End: 2024-12-12

## 2024-12-12 NOTE — TELEPHONE ENCOUNTER
Provider:  MAYDA BALTAZAR    Caller: VINICIUS CARY    Relationship to Patient: SELF      Phone Number: [phone]    Reason for Call:  PATIENT CALLED TO CANCEL FRACTURE F/U APPT. WITH DR. GARCIA ON 12/17/24. PATIENT DID NOT WISH TO RESCHEDULE. PATIENT SAYS SHE IS GOING TO OTHER PROVIDER. PATIENT SAYS SHE WAS NOT HAPPY WITH HER CARE AT OUR OFFICE. PATIENT DECLINED TO SPEAK WITH A SUPERVISOR WHEN OFFERED.    When was the patient last seen: 11/20/24

## 2024-12-17 NOTE — PROGRESS NOTES
[facility] (11/14/2024)  AP, lateral, and oblique views of the right ankle were performed at [facility], have been reviewed and interpreted by me.  The radiology report has been reviewed as well.  Imaging quality is adequate for review. There are no fractures or dislocations present.  Bone and tissue quality are normal.  Joint spaces are well maintained.    Right foot x-ray 11/26/2024  AP, lateral, and oblique views of the right foot was obtained in the office on today's visit, reviewed and interpreted by me.  Imaging quality is adequate for review.  The previously noted mildly displaced fracture of the base of the fifth metatarsal is starting to have callus formation.  This is in the zone one of the fifth metatarsal.  Joint spaces are otherwise well-maintained.  Bone quality appears normal.    Right foot x-ray 12/18/2024  AP, lateral, and oblique views of the right foot was obtained in the office on today's visit, reviewed and interpreted by me.  Imaging quality is adequate for review.  The previously noted mildly displaced fracture of the base of the fifth metatarsal is continuing to have callus formation.  This is in the zone one of the fifth metatarsal.  Joint spaces are otherwise well-maintained.  Bone quality appears normal.    Assessment:   1. Closed displaced fracture of fifth metatarsal bone of right foot, initial encounter  -     XR FOOT RIGHT (MIN 3 VIEWS); Future       Plan:  Continue conservative treatment.  Patient will wear the boot for another 2 weeks and then transition to a normal shoe.  I have gone over progressive weightbearing and continuing to weight-bear as tolerated.  I will see her 1 last time in about 4 weeks for repeat x-ray.  At that time, I suspect she will be released.  She is agreeable to this treatment plan.  All questions answered.    Return in about 4 weeks (around 1/15/2025) for f/u R foot fx.    Electronically signed by ANDREIA Robins on 12/18/2024 at 2:27 PM.

## 2024-12-18 ENCOUNTER — OFFICE VISIT (OUTPATIENT)
Age: 64
End: 2024-12-18
Payer: COMMERCIAL

## 2024-12-18 VITALS — HEIGHT: 66 IN | WEIGHT: 158 LBS | BODY MASS INDEX: 25.39 KG/M2

## 2024-12-18 DIAGNOSIS — S92.351A CLOSED DISPLACED FRACTURE OF FIFTH METATARSAL BONE OF RIGHT FOOT, INITIAL ENCOUNTER: Primary | ICD-10-CM

## 2024-12-18 PROCEDURE — G8419 CALC BMI OUT NRM PARAM NOF/U: HCPCS | Performed by: PHYSICIAN ASSISTANT

## 2024-12-18 PROCEDURE — G8484 FLU IMMUNIZE NO ADMIN: HCPCS | Performed by: PHYSICIAN ASSISTANT

## 2024-12-18 PROCEDURE — G8400 PT W/DXA NO RESULTS DOC: HCPCS | Performed by: PHYSICIAN ASSISTANT

## 2024-12-18 PROCEDURE — 1123F ACP DISCUSS/DSCN MKR DOCD: CPT | Performed by: PHYSICIAN ASSISTANT

## 2024-12-18 PROCEDURE — 4004F PT TOBACCO SCREEN RCVD TLK: CPT | Performed by: PHYSICIAN ASSISTANT

## 2024-12-18 PROCEDURE — 1090F PRES/ABSN URINE INCON ASSESS: CPT | Performed by: PHYSICIAN ASSISTANT

## 2024-12-18 PROCEDURE — G8427 DOCREV CUR MEDS BY ELIG CLIN: HCPCS | Performed by: PHYSICIAN ASSISTANT

## 2024-12-18 PROCEDURE — 3017F COLORECTAL CA SCREEN DOC REV: CPT | Performed by: PHYSICIAN ASSISTANT

## 2024-12-18 PROCEDURE — 99213 OFFICE O/P EST LOW 20 MIN: CPT | Performed by: PHYSICIAN ASSISTANT

## 2025-01-15 NOTE — PROGRESS NOTES
Orthopaedic Clinic Note    NAME:  Camelia Cueva   : 1960  MRN: [id number]      2025     CHIEF COMPLAINT:  Right foot pain      HISTORY OF PRESENT ILLNESS:   Camelia is a 64 y.o. female who presents to the office for evaluation and treatment of the right foot.   Patient injured the foot on 2024 when she fell in someone's yard and twisted her foot.  1 week later, 2024, the patient had imaging performed at [facility] of the right foot and ankle.  She was found to have 5th metatarsal fracture.  She found a shoe on [employer] that she has been wearing.  She has been mostly nonweightbearing.    I most recently saw the patient on 2024 patient has since transition to a normal shoe.  She is weightbearing as tolerated.  Pain is continuing to improve but not resolved.  She is here for serial x-ray.    Past Medical History:    No past medical history on file.    Past Surgical History:        Procedure Laterality Date    APPENDECTOMY      CHOLECYSTECTOMY         Current Medications:   Prior to Admission medications    Medication Sig Start Date End Date Taking? Authorizing Provider   cetirizine (ZYRTEC) 10 MG tablet Take 1 tablet by mouth daily   Yes ProviderLucas MD   lisinopril (PRINIVIL;ZESTRIL) 40 MG tablet Take 1 tablet by mouth daily 24  Yes Lucas Rios MD   pantoprazole (PROTONIX) 40 MG tablet Take 1 tablet by mouth daily 24  Yes Lucas Rios MD   pravastatin (PRAVACHOL) 10 MG tablet Take 1 tablet by mouth daily 24  Yes Lucas Rios MD   traZODone (DESYREL) 100 MG tablet Take 1 tablet by mouth nightly 24  Yes Lucas Rios MD   zolpidem (AMBIEN) 10 MG tablet Take 1 tablet by mouth nightly as needed. 24  Yes ProviderLucas MD       Allergies:  Codeine    Social History:   Social History     Occupational History    Not on file   Tobacco Use    Smoking status: Every Day     Current packs/day: 0.50     Types: Cigarettes    Smokeless

## 2025-01-16 ENCOUNTER — OFFICE VISIT (OUTPATIENT)
Age: 65
End: 2025-01-16
Payer: COMMERCIAL

## 2025-01-16 VITALS — WEIGHT: 158 LBS | HEIGHT: 66 IN | BODY MASS INDEX: 25.39 KG/M2

## 2025-01-16 DIAGNOSIS — S92.351D CLOSED DISPLACED FRACTURE OF FIFTH METATARSAL BONE OF RIGHT FOOT WITH ROUTINE HEALING, SUBSEQUENT ENCOUNTER: Primary | ICD-10-CM

## 2025-01-16 PROCEDURE — G8427 DOCREV CUR MEDS BY ELIG CLIN: HCPCS | Performed by: PHYSICIAN ASSISTANT

## 2025-01-16 PROCEDURE — 4004F PT TOBACCO SCREEN RCVD TLK: CPT | Performed by: PHYSICIAN ASSISTANT

## 2025-01-16 PROCEDURE — 3017F COLORECTAL CA SCREEN DOC REV: CPT | Performed by: PHYSICIAN ASSISTANT

## 2025-01-16 PROCEDURE — 1090F PRES/ABSN URINE INCON ASSESS: CPT | Performed by: PHYSICIAN ASSISTANT

## 2025-01-16 PROCEDURE — G8400 PT W/DXA NO RESULTS DOC: HCPCS | Performed by: PHYSICIAN ASSISTANT

## 2025-01-16 PROCEDURE — 99213 OFFICE O/P EST LOW 20 MIN: CPT | Performed by: PHYSICIAN ASSISTANT

## 2025-01-16 PROCEDURE — G8419 CALC BMI OUT NRM PARAM NOF/U: HCPCS | Performed by: PHYSICIAN ASSISTANT

## 2025-01-16 PROCEDURE — 1123F ACP DISCUSS/DSCN MKR DOCD: CPT | Performed by: PHYSICIAN ASSISTANT

## 2025-02-05 DIAGNOSIS — G47.00 INSOMNIA, UNSPECIFIED TYPE: ICD-10-CM

## 2025-02-05 RX ORDER — ZOLPIDEM TARTRATE 10 MG/1
10 TABLET ORAL NIGHTLY PRN
Qty: 30 TABLET | Refills: 2 | Status: SHIPPED | OUTPATIENT
Start: 2025-02-05

## 2025-02-25 ENCOUNTER — OFFICE VISIT (OUTPATIENT)
Age: 65
End: 2025-02-25
Payer: MEDICARE

## 2025-02-25 VITALS — WEIGHT: 158 LBS | HEIGHT: 66 IN | BODY MASS INDEX: 25.39 KG/M2

## 2025-02-25 DIAGNOSIS — S92.351D CLOSED DISPLACED FRACTURE OF FIFTH METATARSAL BONE OF RIGHT FOOT WITH ROUTINE HEALING, SUBSEQUENT ENCOUNTER: Primary | ICD-10-CM

## 2025-02-25 PROCEDURE — 99213 OFFICE O/P EST LOW 20 MIN: CPT | Performed by: PHYSICIAN ASSISTANT

## 2025-02-25 PROCEDURE — 1159F MED LIST DOCD IN RCRD: CPT | Performed by: PHYSICIAN ASSISTANT

## 2025-02-25 PROCEDURE — 4004F PT TOBACCO SCREEN RCVD TLK: CPT | Performed by: PHYSICIAN ASSISTANT

## 2025-02-25 PROCEDURE — G8427 DOCREV CUR MEDS BY ELIG CLIN: HCPCS | Performed by: PHYSICIAN ASSISTANT

## 2025-02-25 PROCEDURE — 3017F COLORECTAL CA SCREEN DOC REV: CPT | Performed by: PHYSICIAN ASSISTANT

## 2025-02-25 PROCEDURE — G8419 CALC BMI OUT NRM PARAM NOF/U: HCPCS | Performed by: PHYSICIAN ASSISTANT

## 2025-02-25 PROCEDURE — 1123F ACP DISCUSS/DSCN MKR DOCD: CPT | Performed by: PHYSICIAN ASSISTANT

## 2025-02-25 PROCEDURE — G8400 PT W/DXA NO RESULTS DOC: HCPCS | Performed by: PHYSICIAN ASSISTANT

## 2025-02-25 PROCEDURE — 1090F PRES/ABSN URINE INCON ASSESS: CPT | Performed by: PHYSICIAN ASSISTANT

## 2025-02-25 NOTE — PROGRESS NOTES
Orthopaedic Clinic Note-Established Patient    NAME:  Camelia Cueva   : 1960  MRN: [id number]      2025     CHIEF COMPLAINT:  Right foot pain      HISTORY OF PRESENT ILLNESS:   Camelia is a 65 y.o. female who presents to the office for evaluation and treatment of the right foot.   Patient injured the foot on 2024 when she fell in someone's yard and twisted her foot.  1 week later, 2024, the patient had imaging performed at [facility] of the right foot and ankle.  She was found to have 5th metatarsal fracture.  She found a shoe on [employer] that she has been wearing.  She has been mostly nonweightbearing.    I most recently saw the patient on 2025.  At that visit, she had transitioned into a normal shoe.  She was still having some slight pain.  She was full weightbearing.  Due to continued pain and difficulty with the gait, we brought her back a final time for reevaluation.      Past Medical History:    No past medical history on file.    Past Surgical History:        Procedure Laterality Date    APPENDECTOMY      CHOLECYSTECTOMY         Current Medications:   Prior to Admission medications    Medication Sig Start Date End Date Taking? Authorizing Provider   cetirizine (ZYRTEC) 10 MG tablet Take 1 tablet by mouth daily   Yes ProviderLucas MD   lisinopril (PRINIVIL;ZESTRIL) 40 MG tablet Take 1 tablet by mouth daily 24  Yes ProviderLucas MD   pantoprazole (PROTONIX) 40 MG tablet Take 1 tablet by mouth daily 24  Yes ProviderLucas MD   pravastatin (PRAVACHOL) 10 MG tablet Take 1 tablet by mouth daily 24  Yes ProviderLucas MD   traZODone (DESYREL) 100 MG tablet Take 1 tablet by mouth nightly 24  Yes ProviderLucas MD   zolpidem (AMBIEN) 10 MG tablet Take 1 tablet by mouth nightly as needed. 24  Yes ProviderLucas MD       Allergies:  Codeine    Social History:   Social History     Occupational History    Not on file   Tobacco Use

## 2025-03-19 ENCOUNTER — OFFICE VISIT (OUTPATIENT)
Dept: INTERNAL MEDICINE | Facility: CLINIC | Age: 65
End: 2025-03-19
Payer: COMMERCIAL

## 2025-03-19 VITALS
SYSTOLIC BLOOD PRESSURE: 110 MMHG | DIASTOLIC BLOOD PRESSURE: 68 MMHG | HEIGHT: 67 IN | BODY MASS INDEX: 24.64 KG/M2 | WEIGHT: 157 LBS | OXYGEN SATURATION: 97 % | HEART RATE: 100 BPM | TEMPERATURE: 97.9 F

## 2025-03-19 DIAGNOSIS — Z79.899 ENCOUNTER FOR LONG-TERM CURRENT USE OF MEDICATION: ICD-10-CM

## 2025-03-19 DIAGNOSIS — Z53.21 PATIENT LEFT WITHOUT BEING SEEN: ICD-10-CM

## 2025-03-19 DIAGNOSIS — R73.03 PREDIABETES: Primary | ICD-10-CM

## 2025-03-19 LAB
AMPHET+METHAMPHET UR QL: NEGATIVE
AMPHETAMINE INTERNAL CONTROL: NORMAL
AMPHETAMINES UR QL: NEGATIVE
BARBITURATE INTERNAL CONTROL: NORMAL
BARBITURATES UR QL SCN: NEGATIVE
BENZODIAZ UR QL SCN: NEGATIVE
BENZODIAZEPINE INTERNAL CONTROL: NORMAL
BUPRENORPHINE INTERNAL CONTROL: NORMAL
BUPRENORPHINE SERPL-MCNC: NEGATIVE NG/ML
CANNABINOIDS SERPL QL: NEGATIVE
COCAINE INTERNAL CONTROL: NORMAL
COCAINE UR QL: NEGATIVE
EXPIRATION DATE: NORMAL
HBA1C MFR BLD: 5.6 % (ref 4.5–5.7)
Lab: NORMAL
MDMA (ECSTASY) INTERNAL CONTROL: NORMAL
MDMA UR QL SCN: NEGATIVE
METHADONE INTERNAL CONTROL: NORMAL
METHADONE UR QL SCN: NEGATIVE
METHAMPHETAMINE INTERNAL CONTROL: NORMAL
MORPHINE INTERNAL CONTROL: NORMAL
MORPHINE/OPIATES SCREEN, URINE: NEGATIVE
OXYCODONE INTERNAL CONTROL: NORMAL
OXYCODONE UR QL SCN: NEGATIVE
PCP UR QL SCN: NEGATIVE
PHENCYCLIDINE INTERNAL CONTROL: NORMAL
PROPOXYPH UR QL SCN: NEGATIVE
PROPOXYPHENE INTERNAL CONTROL: NORMAL
THC INTERNAL CONTROL: NORMAL
TRICYCLIC ANTIDEPRESSANTS INTERNAL CONTROL: NORMAL
TRICYCLICS UR QL SCN: NEGATIVE

## 2025-05-07 DIAGNOSIS — G47.00 INSOMNIA, UNSPECIFIED TYPE: ICD-10-CM

## 2025-05-07 RX ORDER — ZOLPIDEM TARTRATE 10 MG/1
10 TABLET ORAL NIGHTLY PRN
Start: 2025-05-07

## 2025-05-16 ENCOUNTER — TELEPHONE (OUTPATIENT)
Dept: INTERNAL MEDICINE | Facility: CLINIC | Age: 65
End: 2025-05-16

## 2025-05-16 DIAGNOSIS — G47.00 INSOMNIA, UNSPECIFIED TYPE: ICD-10-CM

## 2025-05-16 RX ORDER — ZOLPIDEM TARTRATE 10 MG/1
10 TABLET ORAL NIGHTLY PRN
Qty: 30 TABLET | Refills: 2 | OUTPATIENT
Start: 2025-05-16

## 2025-05-16 RX ORDER — TRAZODONE HYDROCHLORIDE 100 MG/1
100 TABLET ORAL NIGHTLY
Qty: 90 TABLET | Refills: 2 | Status: SHIPPED | OUTPATIENT
Start: 2025-05-16

## 2025-05-16 RX ORDER — LISINOPRIL 40 MG/1
40 TABLET ORAL DAILY
Qty: 90 TABLET | Refills: 3 | Status: SHIPPED | OUTPATIENT
Start: 2025-05-16

## 2025-05-16 NOTE — TELEPHONE ENCOUNTER
Caller: Elizabeth Carrillo    Relationship: Self    Best call back number: [phone] (Mobile)     Requested Prescriptions:     zolpidem (AMBIEN)        lisinopril (PRINIVIL,ZESTRIL)       traZODone (DESYREL)     THE PATIENT STATES THAT SHE WOULD LIKE TO PICK THESE THREE PRESCRIPTIONS UP TODAY, IF POSSIBLE.    Pharmacy where request should be sent: [address] - [phone] [facility] [phone]      Last office visit with prescribing clinician: 3/19/2025   Last telemedicine visit with prescribing clinician: Visit date not found   Next office visit with prescribing clinician: 5/16/2025     Does the patient have less than a 3 day supply:  [x] Yes  [] No      Sohan Roberts Rep   05/16/25 08:35 CDT

## 2025-05-19 RX ORDER — ZOLPIDEM TARTRATE 10 MG/1
10 TABLET ORAL NIGHTLY PRN
Qty: 30 TABLET | Refills: 2 | Status: SHIPPED | OUTPATIENT
Start: 2025-05-19

## 2025-05-19 NOTE — TELEPHONE ENCOUNTER
Sometimes insurance limits to 15 for a 30 day supply, but looks like she has been getting 15 about every 2 weeks, so I have changed the quantity to 30 if appropriate.

## 2025-07-28 ENCOUNTER — TELEPHONE (OUTPATIENT)
Dept: MRI IMAGING | Facility: HOSPITAL | Age: 65
End: 2025-07-28
Payer: MEDICARE

## 2025-08-03 DIAGNOSIS — G47.00 INSOMNIA, UNSPECIFIED TYPE: ICD-10-CM

## 2025-08-04 RX ORDER — ZOLPIDEM TARTRATE 10 MG/1
10 TABLET ORAL NIGHTLY PRN
Qty: 30 TABLET | Refills: 2 | Status: SHIPPED | OUTPATIENT
Start: 2025-08-13

## (undated) DEVICE — ENDOPATH XCEL WITH OPTIVIEW TECHNOLOGY DILATING TIP TROCARS WITH STABILITY SLEEVES: Brand: ENDOPATH XCEL OPTIVIEW

## (undated) DEVICE — TOTAL TRAY, 16FR 10ML SIL FOLEY, URN: Brand: MEDLINE

## (undated) DEVICE — ENDOPOUCH RETRIEVER SPECIMEN RETRIEVAL BAGS: Brand: ENDOPOUCH RETRIEVER

## (undated) DEVICE — TBG SMPL FLTR LINE NASL 02/C02 A/ BX/100

## (undated) DEVICE — SUT VIC 0 SUTUPAK TIES 18IN J906G

## (undated) DEVICE — ANTIBACTERIAL UNDYED BRAIDED (POLYGLACTIN 910), SYNTHETIC ABSORBABLE SUTURE: Brand: COATED VICRYL

## (undated) DEVICE — MASK,OXYGEN,MED CONC,ADLT,7' TUB, UC: Brand: PENDING

## (undated) DEVICE — GLV SURG BIOGEL LTX PF 6 1/2

## (undated) DEVICE — Device: Brand: DEFENDO AIR/WATER/SUCTION AND BIOPSY VALVE

## (undated) DEVICE — PAD LAP CHOLE: Brand: MEDLINE INDUSTRIES, INC.

## (undated) DEVICE — ELECTRD BLD EDGE/INSUL1P 2.4X5.1MM STRL

## (undated) DEVICE — ENDOPATH XCEL DILATING TIP TROCARS WITH STABILITY SLEEVES: Brand: ENDOPATH XCEL

## (undated) DEVICE — SENSR O2 OXIMAX FNGR A/ 18IN NONSTR

## (undated) DEVICE — THE CHANNEL CLEANING BRUSH IS A NYLON FLEXI BRUSH ATTACHED TO A FLEXIBLE PLASTIC SHEATH DESIGNED TO SAFELY REMOVE DEBRIS FROM FLEXIBLE ENDOSCOPES.

## (undated) DEVICE — TRY PREP SCRB VAG PVP

## (undated) DEVICE — 2, DISPOSABLE SUCTION/IRRIGATOR WITHOUT DISPOSABLE TIP: Brand: STRYKEFLOW

## (undated) DEVICE — YANKAUER,BULB TIP WITH VENT: Brand: ARGYLE

## (undated) DEVICE — PK TURNOVER RM ADV

## (undated) DEVICE — ENDOPATH ETS-FLEX45 ARTICULATING ENDOSCOPIC LINEAR CUTTER, NO RELOAD: Brand: ENDOPATH

## (undated) DEVICE — ENDOPATH PNEUMONEEDLE INSUFFLATION NEEDLES WITH LUER LOCK CONNECTORS 120MM: Brand: ENDOPATH

## (undated) DEVICE — ENDOPATH XCEL WITH OPTIVIEW TECHNOLOGY UNIVERSAL TROCAR STABILITY SLEEVES: Brand: ENDOPATH XCEL OPTIVIEW

## (undated) DEVICE — MONOPOLAR METZENBAUM SCISSOR, MINI BLADE TIP, DISPOSABLE: Brand: MONOPOLAR METZENBAUM SCISSOR, MINI BLADE TIP, DISPOSABLE